# Patient Record
Sex: FEMALE | Race: WHITE | NOT HISPANIC OR LATINO | Employment: OTHER | URBAN - NONMETROPOLITAN AREA
[De-identification: names, ages, dates, MRNs, and addresses within clinical notes are randomized per-mention and may not be internally consistent; named-entity substitution may affect disease eponyms.]

---

## 2017-01-17 ENCOUNTER — OFFICE VISIT (OUTPATIENT)
Dept: CARDIOLOGY | Facility: CLINIC | Age: 72
End: 2017-01-17

## 2017-01-17 VITALS
WEIGHT: 148.4 LBS | HEIGHT: 66 IN | SYSTOLIC BLOOD PRESSURE: 120 MMHG | BODY MASS INDEX: 23.85 KG/M2 | HEART RATE: 88 BPM | OXYGEN SATURATION: 96 % | DIASTOLIC BLOOD PRESSURE: 80 MMHG

## 2017-01-17 DIAGNOSIS — M81.0 OSTEOPOROSIS: ICD-10-CM

## 2017-01-17 DIAGNOSIS — E78.2 MIXED HYPERLIPIDEMIA: Primary | ICD-10-CM

## 2017-01-17 DIAGNOSIS — R53.82 CHRONIC FATIGUE: ICD-10-CM

## 2017-01-17 PROCEDURE — 99213 OFFICE O/P EST LOW 20 MIN: CPT | Performed by: INTERNAL MEDICINE

## 2017-01-17 NOTE — PROGRESS NOTES
subjective     Chief Complaint   Patient presents with   • Hyperlipidemia   • Osteoporosis     History of Present Illness    Hyperlipidemia  Elise Santamaria has long-standing history of hyperlipidemia.  Has been trying to lose weight and trying to follow diet and activity recommandations.  Patient is tolerating medications very well.  There has been no side effects.  No lab work since initiating Zocor on a regular basis.  Lab work will be checked next visit.    She has osteoporosis and complains of mild chronic fatigue also.  Will check B12 levels.  10 vitamin D.    Patient Active Problem List   Diagnosis   • Hyperlipidemia   • Osteoporosis       Social History   Substance Use Topics   • Smoking status: Never Smoker   • Smokeless tobacco: Never Used   • Alcohol use No       Allergies   Allergen Reactions   • Latex          Current Outpatient Prescriptions:   •  aspirin 81 MG EC tablet, Take 81 mg by mouth daily., Disp: , Rfl:   •  cholecalciferol (VITAMIN D3) 1000 UNITS tablet, Take 1,000 Units by mouth daily., Disp: , Rfl:   •  Multiple Vitamin (MULTI VITAMIN DAILY PO), Take  by mouth., Disp: , Rfl:   •  Omega-3 Fatty Acids (FISH OIL) 1000 MG capsule capsule, Take 1,000 mg by mouth 2 (two) times a day with meals., Disp: , Rfl:   •  simvastatin (ZOCOR) 20 MG tablet, Take 1 tablet by mouth Every Night., Disp: 90 tablet, Rfl: 1  •  vitamin B-12 (CYANOCOBALAMIN) 1000 MCG tablet, Take 1,000 mcg by mouth daily., Disp: , Rfl:       The following portions of the patient's history were reviewed and updated as appropriate: allergies, current medications, past family history, past medical history, past social history, past surgical history and problem list.    Review of Systems   Constitution: Positive for malaise/fatigue.   HENT: Negative.    Eyes: Negative.    Cardiovascular: Negative.    Respiratory: Negative.    Hematologic/Lymphatic: Negative.    Musculoskeletal: Negative.    Gastrointestinal: Negative.   "  Neurological: Negative.           Objective:     Visit Vitals   • /80 (BP Location: Left arm, Patient Position: Sitting)   • Pulse 88   • Ht 66\" (167.6 cm)   • Wt 148 lb 6.4 oz (67.3 kg)   • SpO2 96%   • BMI 23.95 kg/m2     Physical Exam   Constitutional: She appears well-developed and well-nourished.   HENT:   Head: Normocephalic and atraumatic.   Mouth/Throat: Oropharynx is clear and moist.   Eyes: Conjunctivae and EOM are normal. Pupils are equal, round, and reactive to light. No scleral icterus.   Neck: Normal range of motion. Neck supple. No JVD present. No tracheal deviation present. No thyromegaly present.   Cardiovascular: Normal rate, regular rhythm, normal heart sounds and intact distal pulses.  Exam reveals no friction rub.    No murmur heard.  Pulmonary/Chest: Effort normal and breath sounds normal. No respiratory distress. She has no wheezes. She has no rales. She exhibits no tenderness.   Abdominal: Soft. Bowel sounds are normal. She exhibits no distension and no mass. There is no tenderness. There is no rebound and no guarding.   Musculoskeletal: Normal range of motion. She exhibits no edema, tenderness or deformity.   Lymphadenopathy:     She has no cervical adenopathy.   Neurological: She is alert. She has normal reflexes. No cranial nerve deficit. She exhibits normal muscle tone. Coordination normal.   Skin: Skin is warm and dry.   Psychiatric: She has a normal mood and affect. Her behavior is normal. Judgment and thought content normal.         Lab Review  Lab Results   Component Value Date     10/27/2016    K 3.9 10/27/2016     10/27/2016    BUN 16 10/27/2016    CREATININE 0.94 10/27/2016    GLUCOSE 88 10/27/2016    CALCIUM 9.8 10/27/2016    ALT 28 10/27/2016    ALKPHOS 72 10/27/2016    LABIL2 1.7 10/27/2016     Lab Results   Component Value Date    CKTOTAL 92 10/27/2016     Lab Results   Component Value Date    WBC 3.61 (L) 10/27/2016    HGB 14.0 10/27/2016    HCT 44.7 " 10/27/2016     10/27/2016     Lab Results   Component Value Date    INR 0.95 05/19/2014     No results found for: MG  Lab Results   Component Value Date    TSH 2.389 10/27/2016     No results found for: BNP  Lab Results   Component Value Date    CHLPL 195 05/09/2016     Lab Results   Component Value Date    CHOL 230 (H) 10/27/2016    TRIG 125 10/27/2016    HDL 62 10/27/2016    LDLCALC 143 (H) 10/27/2016    VLDL 25 10/27/2016    LDLHDL 2.31 10/27/2016         Procedures     I personally viewed and interpreted the patient's LAB data         Assessment:     1. Mixed hyperlipidemia    2. Osteoporosis    3. Chronic fatigue          Plan:      Patient has significant hyperlipidemia with LDL of 143.  Since that lab reports she has been taking Zocor 20 mg daily.  There has been no side effects lab work will be checked in 3 months and medicine will need to be adjusted.  Aggressive risk factor modification was stressed again.  Otherwise patient is doing very well no change in therapy was made.      Return in about 3 months (around 4/17/2017).

## 2017-01-17 NOTE — MR AVS SNAPSHOT
Elise Santamaria   1/17/2017 10:45 AM   Office Visit    Dept Phone:  [phone]   Encounter #:  [id number]    Provider:  Víctor Rodrigues MD   Department:  [facility] CARDIOLOGY                Your Full Care Plan              Your Updated Medication List          This list is accurate as of: 1/17/17 11:20 AM.  Always use your most recent med list.                aspirin 81 MG EC tablet       cholecalciferol 1000 UNITS tablet   Commonly known as:  VITAMIN D3       fish oil 1000 MG capsule capsule       MULTI VITAMIN DAILY PO       simvastatin 20 MG tablet   Commonly known as:  ZOCOR   Take 1 tablet by mouth Every Night.       vitamin B-12 1000 MCG tablet   Commonly known as:  CYANOCOBALAMIN               You Were Diagnosed With        Codes Comments    Mixed hyperlipidemia    -  Primary ICD-10-CM: E78.2  ICD-9-CM: 272.2     Osteoporosis     ICD-10-CM: M81.0  ICD-9-CM: 733.00     Chronic fatigue     ICD-10-CM: R53.82  ICD-9-CM: 780.79       Instructions     None    Patient Instructions History      Upcoming Appointments     Visit Type Date Time Department    FOLLOW UP 1/17/2017 10:45 AM [facility] CARDIOLOGY [facility]    LAB 4/10/2017  8:30 AM [facility]ARDI[facility]    FOLLOW UP 4/12/2017 11:00 AM [facility]      MyChart Signup     Our records indicate that you have declined [facility] MyChart signup. If you would like to sign up for MyChart, please email [email or web address] or call [phone] to obtain an activation code.             Other Info from Your Visit           Your Appointments     Apr 10, 2017  8:30 AM EDT   Lab with [facility]   [facility] CARDIOLOGY (--)    [address] [place] [address]   [phone]            Apr 12, 2017 11:00 AM EDT   Follow Up with Víctor Rodrigues MD   [facility] CARDIOLOGY (--)    [address] [place] [address]   [phone]           Arrive 15  "minutes prior to appointment.              Allergies     Latex        Reason for Visit     Hyperlipidemia     Osteoporosis           Vital Signs     Blood Pressure Pulse Height Weight Oxygen Saturation Body Mass Index    120/80 (BP Location: Left arm, Patient Position: Sitting) 88 66\" (167.6 cm) 148 lb 6.4 oz (67.3 kg) 96% 23.95 kg/m2    Smoking Status                   Never Smoker           Problems and Diagnoses Noted     High cholesterol or triglycerides    Osteoporosis    Chronic fatigue            "

## 2017-02-15 ENCOUNTER — TELEPHONE (OUTPATIENT)
Dept: CARDIOLOGY | Facility: CLINIC | Age: 72
End: 2017-02-15

## 2017-02-15 RX ORDER — AMOXICILLIN 500 MG/1
500 CAPSULE ORAL 3 TIMES DAILY
Qty: 21 CAPSULE | Refills: 0 | Status: SHIPPED | OUTPATIENT
Start: 2017-02-15 | End: 2017-04-12

## 2017-02-15 NOTE — TELEPHONE ENCOUNTER
----- Message from Víctor Rodrigues MD sent at 2/14/2017  4:28 PM EST -----  Amoxicillin 500 3 times a day  ----- Message -----     From: Ophelia Silva MA     Sent: 2/14/2017   3:06 PM       To: Víctor Rodrigues MD    Pt c/o cough and congestion  No fever    Requesting antibiotic   [facility]

## 2017-03-16 ENCOUNTER — TELEPHONE (OUTPATIENT)
Dept: CARDIOLOGY | Facility: CLINIC | Age: 72
End: 2017-03-16

## 2017-03-16 RX ORDER — SIMVASTATIN 20 MG
20 TABLET ORAL NIGHTLY
Qty: 90 TABLET | Refills: 1 | Status: SHIPPED | OUTPATIENT
Start: 2017-03-16 | End: 2017-07-12 | Stop reason: SDUPTHER

## 2017-03-16 NOTE — TELEPHONE ENCOUNTER
----- Message from Wendi Rees sent at 3/16/2017  1:57 PM EDT -----  Regarding: REFILL   [facility]  SIMVASTATIN 20 MG QD

## 2017-04-10 ENCOUNTER — LAB (OUTPATIENT)
Dept: CARDIOLOGY | Facility: CLINIC | Age: 72
End: 2017-04-10

## 2017-04-10 DIAGNOSIS — R53.82 CHRONIC FATIGUE: ICD-10-CM

## 2017-04-10 DIAGNOSIS — E78.2 MIXED HYPERLIPIDEMIA: ICD-10-CM

## 2017-04-10 DIAGNOSIS — M81.0 OSTEOPOROSIS: ICD-10-CM

## 2017-04-10 LAB
25(OH)D3 SERPL-MCNC: 41 NG/ML
ALBUMIN SERPL-MCNC: 4.3 G/DL (ref 3.4–4.8)
ALBUMIN/GLOB SERPL: 1.9 G/DL (ref 1.5–2.5)
ALP SERPL-CCNC: 64 U/L (ref 35–104)
ALT SERPL W P-5'-P-CCNC: 25 U/L (ref 10–36)
ANION GAP SERPL CALCULATED.3IONS-SCNC: 2 MMOL/L (ref 3.6–11.2)
AST SERPL-CCNC: 22 U/L (ref 10–30)
BASOPHILS # BLD AUTO: 0.05 10*3/MM3 (ref 0–0.3)
BASOPHILS NFR BLD AUTO: 1.4 % (ref 0–2)
BILIRUB SERPL-MCNC: 0.6 MG/DL (ref 0.2–1.8)
BUN BLD-MCNC: 12 MG/DL (ref 7–21)
BUN/CREAT SERPL: 14.6 (ref 7–25)
CALCIUM SPEC-SCNC: 9.9 MG/DL (ref 7.7–10)
CHLORIDE SERPL-SCNC: 108 MMOL/L (ref 99–112)
CHOLEST SERPL-MCNC: 167 MG/DL (ref 0–200)
CK SERPL-CCNC: 73 U/L (ref 24–173)
CO2 SERPL-SCNC: 32 MMOL/L (ref 24.3–31.9)
CREAT BLD-MCNC: 0.82 MG/DL (ref 0.43–1.29)
DEPRECATED RDW RBC AUTO: 42.5 FL (ref 37–54)
EOSINOPHIL # BLD AUTO: 0.23 10*3/MM3 (ref 0–0.7)
EOSINOPHIL NFR BLD AUTO: 6.2 % (ref 0–7)
ERYTHROCYTE [DISTWIDTH] IN BLOOD BY AUTOMATED COUNT: 12.8 % (ref 11.5–14.5)
GFR SERPL CREATININE-BSD FRML MDRD: 69 ML/MIN/1.73
GLOBULIN UR ELPH-MCNC: 2.3 GM/DL
GLUCOSE BLD-MCNC: 90 MG/DL (ref 70–110)
HCT VFR BLD AUTO: 43.5 % (ref 37–47)
HDLC SERPL-MCNC: 59 MG/DL (ref 60–100)
HGB BLD-MCNC: 14.1 G/DL (ref 12–16)
IMM GRANULOCYTES # BLD: 0.01 10*3/MM3 (ref 0–0.03)
IMM GRANULOCYTES NFR BLD: 0.3 % (ref 0–0.5)
LDLC SERPL CALC-MCNC: 83 MG/DL (ref 0–100)
LDLC/HDLC SERPL: 1.41 {RATIO}
LYMPHOCYTES # BLD AUTO: 1.5 10*3/MM3 (ref 1–3)
LYMPHOCYTES NFR BLD AUTO: 40.5 % (ref 16–46)
MCH RBC QN AUTO: 30.1 PG (ref 27–33)
MCHC RBC AUTO-ENTMCNC: 32.4 G/DL (ref 33–37)
MCV RBC AUTO: 92.8 FL (ref 80–94)
MONOCYTES # BLD AUTO: 0.34 10*3/MM3 (ref 0.1–0.9)
MONOCYTES NFR BLD AUTO: 9.2 % (ref 0–12)
NEUTROPHILS # BLD AUTO: 1.57 10*3/MM3 (ref 1.4–6.5)
NEUTROPHILS NFR BLD AUTO: 42.4 % (ref 40–75)
OSMOLALITY SERPL CALC.SUM OF ELEC: 282.4 MOSM/KG (ref 273–305)
PLATELET # BLD AUTO: 212 10*3/MM3 (ref 130–400)
PMV BLD AUTO: 10.8 FL (ref 6–10)
POTASSIUM BLD-SCNC: 4.7 MMOL/L (ref 3.5–5.3)
PROT SERPL-MCNC: 6.6 G/DL (ref 6–8)
RBC # BLD AUTO: 4.69 10*6/MM3 (ref 4.2–5.4)
SODIUM BLD-SCNC: 142 MMOL/L (ref 135–153)
TRIGL SERPL-MCNC: 124 MG/DL (ref 0–150)
VIT B12 BLD-MCNC: 1193 PG/ML (ref 211–911)
VLDLC SERPL-MCNC: 24.8 MG/DL
WBC NRBC COR # BLD: 3.7 10*3/MM3 (ref 4.5–12.5)

## 2017-04-10 PROCEDURE — 85025 COMPLETE CBC W/AUTO DIFF WBC: CPT | Performed by: INTERNAL MEDICINE

## 2017-04-10 PROCEDURE — 82607 VITAMIN B-12: CPT | Performed by: INTERNAL MEDICINE

## 2017-04-10 PROCEDURE — 80053 COMPREHEN METABOLIC PANEL: CPT | Performed by: INTERNAL MEDICINE

## 2017-04-10 PROCEDURE — 82550 ASSAY OF CK (CPK): CPT | Performed by: INTERNAL MEDICINE

## 2017-04-10 PROCEDURE — 82306 VITAMIN D 25 HYDROXY: CPT | Performed by: INTERNAL MEDICINE

## 2017-04-10 PROCEDURE — 80061 LIPID PANEL: CPT | Performed by: INTERNAL MEDICINE

## 2017-04-12 ENCOUNTER — OFFICE VISIT (OUTPATIENT)
Dept: CARDIOLOGY | Facility: CLINIC | Age: 72
End: 2017-04-12

## 2017-04-12 VITALS
HEART RATE: 85 BPM | SYSTOLIC BLOOD PRESSURE: 128 MMHG | HEIGHT: 66 IN | WEIGHT: 153.6 LBS | OXYGEN SATURATION: 97 % | DIASTOLIC BLOOD PRESSURE: 76 MMHG | BODY MASS INDEX: 24.68 KG/M2

## 2017-04-12 DIAGNOSIS — E78.2 MIXED HYPERLIPIDEMIA: Primary | ICD-10-CM

## 2017-04-12 DIAGNOSIS — M81.0 OSTEOPOROSIS: ICD-10-CM

## 2017-04-12 PROCEDURE — 99213 OFFICE O/P EST LOW 20 MIN: CPT | Performed by: INTERNAL MEDICINE

## 2017-04-12 NOTE — PROGRESS NOTES
subjective     Chief Complaint   Patient presents with   • Hyperlipidemia   • Osteoporosis     History of Present Illness  Patient is doing very well  Hyperlipidemia  Elise Santamaria has long-standing history of hyperlipidemia. Has been trying to lose weight and trying to follow diet and activity recommandations. Patient is tolerating medications very well. There has been no side effects. No lab work since initiating Zocor on a regular basis.  Lab work will be checked next visit.     She has osteoporosis and complains of mild chronic fatigue also. Will check B12 levels.  10 vitamin D.  Patient Active Problem List   Diagnosis   • Hyperlipidemia   • Osteoporosis       Social History   Substance Use Topics   • Smoking status: Never Smoker   • Smokeless tobacco: Never Used   • Alcohol use No       Allergies   Allergen Reactions   • Latex          Current Outpatient Prescriptions:   •  aspirin 81 MG EC tablet, Take 81 mg by mouth daily., Disp: , Rfl:   •  cholecalciferol (VITAMIN D3) 1000 UNITS tablet, Take 1,000 Units by mouth daily., Disp: , Rfl:   •  Multiple Vitamin (MULTI VITAMIN DAILY PO), Take  by mouth., Disp: , Rfl:   •  Omega-3 Fatty Acids (FISH OIL) 1000 MG capsule capsule, Take 1,000 mg by mouth 2 (two) times a day with meals., Disp: , Rfl:   •  simvastatin (ZOCOR) 20 MG tablet, Take 1 tablet by mouth Every Night., Disp: 90 tablet, Rfl: 1  •  vitamin B-12 (CYANOCOBALAMIN) 1000 MCG tablet, Take 1,000 mcg by mouth daily., Disp: , Rfl:       The following portions of the patient's history were reviewed and updated as appropriate: allergies, current medications, past family history, past medical history, past social history, past surgical history and problem list.    Review of Systems   Constitution: Negative.   HENT: Negative.    Eyes: Negative.    Cardiovascular: Negative.    Respiratory: Negative.    Hematologic/Lymphatic: Negative.    Musculoskeletal: Negative.    Gastrointestinal: Negative.   "  Neurological: Negative.           Objective:     /76 (BP Location: Left arm, Patient Position: Sitting)  Pulse 85  Ht 66\" (167.6 cm)  Wt 153 lb 9.6 oz (69.7 kg)  SpO2 97%  BMI 24.79 kg/m2  Physical Exam   Constitutional: She appears well-developed and well-nourished.   HENT:   Head: Normocephalic and atraumatic.   Mouth/Throat: Oropharynx is clear and moist.   Eyes: Conjunctivae and EOM are normal. Pupils are equal, round, and reactive to light. No scleral icterus.   Neck: Normal range of motion. Neck supple. No JVD present. No tracheal deviation present. No thyromegaly present.   Cardiovascular: Normal rate, regular rhythm, normal heart sounds and intact distal pulses.  Exam reveals no friction rub.    No murmur heard.  Pulmonary/Chest: Effort normal and breath sounds normal. No respiratory distress. She has no wheezes. She has no rales. She exhibits no tenderness.   Abdominal: Soft. Bowel sounds are normal. She exhibits no distension and no mass. There is no tenderness. There is no rebound and no guarding.   Musculoskeletal: Normal range of motion. She exhibits no edema, tenderness or deformity.   Lymphadenopathy:     She has no cervical adenopathy.   Neurological: She is alert. She has normal reflexes. No cranial nerve deficit. She exhibits normal muscle tone. Coordination normal.   Skin: Skin is warm and dry.   Psychiatric: She has a normal mood and affect. Her behavior is normal. Judgment and thought content normal.         Lab Review  Lab Results   Component Value Date     04/10/2017    K 4.7 04/10/2017     04/10/2017    BUN 12 04/10/2017    CREATININE 0.82 04/10/2017    GLUCOSE 90 04/10/2017    CALCIUM 9.9 04/10/2017    ALT 25 04/10/2017    ALKPHOS 64 04/10/2017    LABIL2 1.9 04/10/2017     Lab Results   Component Value Date    CKTOTAL 73 04/10/2017     Lab Results   Component Value Date    WBC 3.70 (L) 04/10/2017    HGB 14.1 04/10/2017    HCT 43.5 04/10/2017     04/10/2017 "     Lab Results   Component Value Date    INR 0.95 05/19/2014     No results found for: MG  Lab Results   Component Value Date    TSH 2.389 10/27/2016     No results found for: BNP  Lab Results   Component Value Date    CHLPL 195 05/09/2016     Lab Results   Component Value Date    CHOL 167 04/10/2017    TRIG 124 04/10/2017    HDL 59 (L) 04/10/2017    LDLCALC 83 04/10/2017    VLDL 24.8 04/10/2017    LDLHDL 1.41 04/10/2017         Procedures     I personally viewed and interpreted the patient's LAB data         Assessment:     1. Mixed hyperlipidemia    2. Osteoporosis          Plan:      lab work discussed with the patient cholesterol is significantly better.  She will continue current medications.     white count is mildly low but hemoglobin and platelets are normal.  Thyroid functions were normal.  Aggressive risk factor modification was stressed again.  Otherwise patient is doing very well no change in therapy was made.    No Follow-up on file.

## 2017-07-12 ENCOUNTER — OFFICE VISIT (OUTPATIENT)
Dept: CARDIOLOGY | Facility: CLINIC | Age: 72
End: 2017-07-12

## 2017-07-12 VITALS
BODY MASS INDEX: 24.59 KG/M2 | HEART RATE: 86 BPM | OXYGEN SATURATION: 96 % | WEIGHT: 153 LBS | RESPIRATION RATE: 18 BRPM | SYSTOLIC BLOOD PRESSURE: 118 MMHG | DIASTOLIC BLOOD PRESSURE: 80 MMHG | HEIGHT: 66 IN

## 2017-07-12 DIAGNOSIS — N39.45 CONTINUOUS LEAKAGE OF URINE: ICD-10-CM

## 2017-07-12 DIAGNOSIS — M81.0 OSTEOPOROSIS: ICD-10-CM

## 2017-07-12 DIAGNOSIS — E78.2 MIXED HYPERLIPIDEMIA: Primary | ICD-10-CM

## 2017-07-12 DIAGNOSIS — K21.9 GASTROESOPHAGEAL REFLUX DISEASE WITHOUT ESOPHAGITIS: ICD-10-CM

## 2017-07-12 DIAGNOSIS — E55.9 VITAMIN D DEFICIENCY: ICD-10-CM

## 2017-07-12 PROBLEM — M67.40 GANGLION CYST: Status: ACTIVE | Noted: 2017-07-12

## 2017-07-12 PROCEDURE — 99214 OFFICE O/P EST MOD 30 MIN: CPT | Performed by: INTERNAL MEDICINE

## 2017-07-12 RX ORDER — PANTOPRAZOLE SODIUM 40 MG/1
40 TABLET, DELAYED RELEASE ORAL DAILY
Qty: 90 TABLET | Refills: 1 | Status: SHIPPED | OUTPATIENT
Start: 2017-07-12 | End: 2018-01-10 | Stop reason: SDUPTHER

## 2017-07-12 RX ORDER — SIMVASTATIN 20 MG
20 TABLET ORAL NIGHTLY
Qty: 90 TABLET | Refills: 1 | Status: SHIPPED | OUTPATIENT
Start: 2017-07-12 | End: 2017-09-19 | Stop reason: SDUPTHER

## 2017-07-12 RX ORDER — SOLIFENACIN SUCCINATE 10 MG/1
10 TABLET, FILM COATED ORAL DAILY
Qty: 90 TABLET | Refills: 1 | Status: SHIPPED | OUTPATIENT
Start: 2017-07-12 | End: 2017-07-21

## 2017-07-12 RX ORDER — NYSTATIN 100000 [USP'U]/G
POWDER TOPICAL
Refills: 2 | COMMUNITY
Start: 2017-04-17 | End: 2018-01-10

## 2017-07-12 NOTE — PROGRESS NOTES
subjective     Chief Complaint   Patient presents with   • Hyperlipidemia   • Osteoporosis   • Weight Loss     History of Present Illness  Patient states that she has been having stress incontinence.  The dad she has developed some rash in the groin area.  She saw the gynecologist and was given Diflucan it did not help.  She saw skin doctor and was given Mycostatin powder which also did not help much.  Patient wants to know what she could do.  Incontinence we'll need to be taken care off.  Patient was advised to take Vesicare and continue with the Mycostatin powder.    Hyperlipidemia  Elise Santamaria has long-standing history of hyperlipidemia. Has been trying to lose weight and trying to follow diet and activity recommandations. Patient is tolerating medications very well. There has been no side effects. No lab work since initiating Zocor on a regular basis.  Lab work will be checked next visit.      She has osteoporosis and complains of mild chronic fatigue also.   She needs DEXA scan which was arranged.      GERD  Elise Santamaria has long-standing history of gastroesophageal reflux disorder however she significantly better on medications.  There is no nausea or vomiting.  No hematemesis or melena.  No abdominal pain.  Mild epigastric heartburns are noted.  Appetite is good bowel movements are normal.    Patient also has vitamin D deficiency and vitamin B deficiency on treatment.    Past Surgical History:   Procedure Laterality Date   • CARDIAC CATHETERIZATION  2014   • CARDIOVASCULAR STRESS TEST  2014   • ECHO - CONVERTED  2014   • ENDOSCOPY  2015   • HYSTERECTOMY  2002   • LAPAROSCOPIC CHOLECYSTECTOMY  2001     Family History   Problem Relation Age of Onset   • Heart attack Mother    • Heart failure Mother    • Hypertension Mother    • Heart attack Father    • Hypertension Father    • Heart block Brother    • Heart block Sister    • Heart block Sister    • Heart attack Brother    • Heart block Brother    •  Stroke Brother    • Breast cancer Neg Hx      Past Medical History:   Diagnosis Date   • Hyperlipidemia    • Osteoporosis    • Tongue irritation    • Weight loss      Patient Active Problem List   Diagnosis   • Hyperlipidemia   • Osteoporosis   • Ganglion cyst   • Gastroesophageal reflux disease without esophagitis   • Vitamin D deficiency   • Continuous leakage of urine       Social History   Substance Use Topics   • Smoking status: Never Smoker   • Smokeless tobacco: Never Used   • Alcohol use No       Allergies   Allergen Reactions   • Latex        Current Outpatient Prescriptions on File Prior to Visit   Medication Sig   • aspirin 81 MG EC tablet Take 81 mg by mouth daily.   • cholecalciferol (VITAMIN D3) 1000 UNITS tablet Take 1,000 Units by mouth daily.   • Multiple Vitamin (MULTI VITAMIN DAILY PO) Take  by mouth.   • Omega-3 Fatty Acids (FISH OIL) 1000 MG capsule capsule Take 1,000 mg by mouth 2 (two) times a day with meals.   • vitamin B-12 (CYANOCOBALAMIN) 1000 MCG tablet Take 1,000 mcg by mouth daily.     No current facility-administered medications on file prior to visit.          The following portions of the patient's history were reviewed and updated as appropriate: allergies, current medications, past family history, past medical history, past social history, past surgical history and problem list.    Review of Systems   Constitution: Negative.   HENT: Negative.  Negative for congestion and headaches.    Eyes: Negative.    Cardiovascular: Negative.  Negative for chest pain, cyanosis, dyspnea on exertion, irregular heartbeat, leg swelling, near-syncope, orthopnea, palpitations, paroxysmal nocturnal dyspnea and syncope.   Respiratory: Negative.  Negative for shortness of breath.    Hematologic/Lymphatic: Negative.    Skin: Positive for rash.   Musculoskeletal: Negative.    Gastrointestinal: Negative.    Genitourinary: Positive for bladder incontinence.   Neurological: Negative.   "  Psychiatric/Behavioral: Negative.           Objective:     /80 (BP Location: Left arm, Patient Position: Sitting, Cuff Size: Adult)  Pulse 86  Resp 18  Ht 66\" (167.6 cm)  Wt 153 lb (69.4 kg)  SpO2 96%  BMI 24.69 kg/m2  Physical Exam   Constitutional: She appears well-developed and well-nourished.   HENT:   Head: Normocephalic and atraumatic.   Mouth/Throat: Oropharynx is clear and moist.   Eyes: Conjunctivae and EOM are normal. Pupils are equal, round, and reactive to light. No scleral icterus.   Neck: Normal range of motion. Neck supple. No JVD present. No tracheal deviation present. No thyromegaly present.   Cardiovascular: Normal rate, regular rhythm, normal heart sounds and intact distal pulses.  Exam reveals no friction rub.    No murmur heard.  Pulmonary/Chest: Effort normal and breath sounds normal. No respiratory distress. She has no wheezes. She has no rales. She exhibits no tenderness.   Abdominal: Soft. Bowel sounds are normal. She exhibits no distension and no mass. There is no tenderness. There is no rebound and no guarding.   Musculoskeletal: Normal range of motion. She exhibits no edema, tenderness or deformity.   Lymphadenopathy:     She has no cervical adenopathy.   Neurological: She is alert. She has normal reflexes. No cranial nerve deficit. She exhibits normal muscle tone. Coordination normal.   Skin: Skin is warm and dry.   Psychiatric: She has a normal mood and affect. Her behavior is normal. Judgment and thought content normal.         Lab Review  Lab Results   Component Value Date     04/10/2017    K 4.7 04/10/2017     04/10/2017    BUN 12 04/10/2017    CREATININE 0.82 04/10/2017    GLUCOSE 90 04/10/2017    CALCIUM 9.9 04/10/2017    ALT 25 04/10/2017    ALKPHOS 64 04/10/2017    LABIL2 1.9 04/10/2017     Lab Results   Component Value Date    CKTOTAL 73 04/10/2017     Lab Results   Component Value Date    WBC 3.70 (L) 04/10/2017    HGB 14.1 04/10/2017    HCT 43.5 " 04/10/2017     04/10/2017     Lab Results   Component Value Date    INR 0.95 05/19/2014     No results found for: MG  Lab Results   Component Value Date    TSH 2.389 10/27/2016     No results found for: BNP  Lab Results   Component Value Date    CHOL 167 04/10/2017    CHLPL 195 05/09/2016    TRIG 124 04/10/2017    HDL 59 (L) 04/10/2017    LDLCALC 83 04/10/2017    VLDL 24.8 04/10/2017    LDLHDL 1.41 04/10/2017         Procedures       I personally viewed and interpreted the patient's LAB data         Assessment:     1. Mixed hyperlipidemia    2. Osteoporosis    3. Vitamin D deficiency    4. Gastroesophageal reflux disease without esophagitis    5. Continuous leakage of urine          Plan:   Patient states that she has been having stress incontinence.  The dad she has developed some rash in the groin area.  She saw the gynecologist and was given Diflucan it did not help.  She saw skin doctor and was given Mycostatin powder which also did not help much.  Patient wants to know what she could do.  Incontinence we'll need to be taken care off.  Patient was advised to take Vesicare and continue with the Mycostatin powder.     Osteoporosis  DEXA scan was scheduled vitamin D level will be checked.    Hyperlipidemia had been very well controlled she was advised to continue Zocor will check lab work next visit.    GI symptoms are much better with Protonix which will be continued.    Refills were given  Patient will also take Vesicare 10 mg daily along with Mycostatin powder.    Follow-up scheduled        Return in about 3 months (around 10/12/2017).

## 2017-07-15 PROBLEM — N39.3 STRESS INCONTINENCE OF URINE: Status: ACTIVE | Noted: 2017-07-15

## 2017-07-20 ENCOUNTER — HOSPITAL ENCOUNTER (OUTPATIENT)
Dept: BONE DENSITY | Facility: HOSPITAL | Age: 72
Discharge: HOME | End: 2017-07-20
Attending: INTERNAL MEDICINE | Admitting: INTERNAL MEDICINE

## 2017-07-20 DIAGNOSIS — M81.0 OSTEOPOROSIS: ICD-10-CM

## 2017-07-20 PROCEDURE — 77080 DXA BONE DENSITY AXIAL: CPT

## 2017-07-20 PROCEDURE — 77080 DXA BONE DENSITY AXIAL: CPT | Performed by: RADIOLOGY

## 2017-07-21 ENCOUNTER — TELEPHONE (OUTPATIENT)
Dept: CARDIOLOGY | Facility: CLINIC | Age: 72
End: 2017-07-21

## 2017-07-21 RX ORDER — ALENDRONATE SODIUM 70 MG/1
70 TABLET ORAL
Qty: 4 TABLET | Refills: 2 | Status: SHIPPED | OUTPATIENT
Start: 2017-07-21 | End: 2018-04-10

## 2017-07-21 RX ORDER — TOLTERODINE 4 MG/1
4 CAPSULE, EXTENDED RELEASE ORAL DAILY
Qty: 30 CAPSULE | Refills: 0 | Status: SHIPPED | OUTPATIENT
Start: 2017-07-21 | End: 2017-07-24

## 2017-07-21 NOTE — TELEPHONE ENCOUNTER
----- Message from Víctor Rodrigues MD sent at 7/21/2017  9:57 AM EDT -----  Detrol LA 4 daily  ----- Message -----     From: Ophelia Silva MA     Sent: 7/21/2017   9:33 AM       To: Víctor Rodrigues MD    vesicare too expensive. Can she take something else?

## 2017-07-24 ENCOUNTER — TELEPHONE (OUTPATIENT)
Dept: CARDIOLOGY | Facility: CLINIC | Age: 72
End: 2017-07-24

## 2017-07-24 RX ORDER — OXYBUTYNIN CHLORIDE 5 MG/1
5 TABLET ORAL 2 TIMES DAILY
Qty: 60 TABLET | Refills: 2 | Status: SHIPPED | OUTPATIENT
Start: 2017-07-24 | End: 2017-11-08 | Stop reason: SDUPTHER

## 2017-07-24 NOTE — TELEPHONE ENCOUNTER
----- Message from Víctor Rodrigues MD sent at 7/24/2017 12:50 PM EDT -----  5 mg by mouth twice a day 60 with 2 refills  ----- Message -----     From: Ophelia Silva MA     Sent: 7/24/2017  11:18 AM       To: Víctor Rodrigues MD    [facility] called stating detrol was very expensive. Is it ok to switch to oxybutin? If so what dose?

## 2017-08-08 ENCOUNTER — TELEPHONE (OUTPATIENT)
Dept: CARDIOLOGY | Facility: CLINIC | Age: 72
End: 2017-08-08

## 2017-08-08 RX ORDER — AMOXICILLIN 500 MG/1
500 CAPSULE ORAL 3 TIMES DAILY
Qty: 21 CAPSULE | Refills: 0 | Status: SHIPPED | OUTPATIENT
Start: 2017-08-08 | End: 2017-10-11

## 2017-08-08 NOTE — TELEPHONE ENCOUNTER
----- Message from Víctor Rodrigues MD sent at 8/8/2017 12:13 PM EDT -----  Amoxicillin 500 3 times a day 7 days  ----- Message -----     From: Ophelia Silva MA     Sent: 8/8/2017  11:50 AM       To: Víctor Rodrigues MD    Pt c/o cough and congestion  No fever  Hoarse in the morning/ sore throat    Requesting antibiotic

## 2017-08-15 ENCOUNTER — HOSPITAL ENCOUNTER (OUTPATIENT)
Dept: GENERAL RADIOLOGY | Facility: HOSPITAL | Age: 72
Discharge: HOME | End: 2017-08-15
Attending: INTERNAL MEDICINE | Admitting: INTERNAL MEDICINE

## 2017-08-15 ENCOUNTER — TELEPHONE (OUTPATIENT)
Dept: CARDIOLOGY | Facility: CLINIC | Age: 72
End: 2017-08-15

## 2017-08-15 ENCOUNTER — LAB (OUTPATIENT)
Dept: LAB | Facility: HOSPITAL | Age: 72
End: 2017-08-15
Attending: INTERNAL MEDICINE

## 2017-08-15 DIAGNOSIS — R05.9 COUGH: Primary | ICD-10-CM

## 2017-08-15 DIAGNOSIS — M81.0 OSTEOPOROSIS: ICD-10-CM

## 2017-08-15 DIAGNOSIS — R05.9 COUGH: ICD-10-CM

## 2017-08-15 DIAGNOSIS — E78.2 MIXED HYPERLIPIDEMIA: ICD-10-CM

## 2017-08-15 LAB
25(OH)D3 SERPL-MCNC: 36 NG/ML
ALBUMIN SERPL-MCNC: 4.3 G/DL (ref 3.4–4.8)
ALBUMIN/GLOB SERPL: 1.7 G/DL (ref 1.5–2.5)
ALP SERPL-CCNC: 78 U/L (ref 35–104)
ALT SERPL W P-5'-P-CCNC: 24 U/L (ref 10–36)
ANION GAP SERPL CALCULATED.3IONS-SCNC: 5 MMOL/L (ref 3.6–11.2)
AST SERPL-CCNC: 20 U/L (ref 10–30)
BASOPHILS # BLD AUTO: 0.04 10*3/MM3 (ref 0–0.3)
BASOPHILS NFR BLD AUTO: 0.6 % (ref 0–2)
BILIRUB SERPL-MCNC: 0.6 MG/DL (ref 0.2–1.8)
BUN BLD-MCNC: 14 MG/DL (ref 7–21)
BUN/CREAT SERPL: 15.7 (ref 7–25)
CALCIUM SPEC-SCNC: 9.6 MG/DL (ref 7.7–10)
CHLORIDE SERPL-SCNC: 108 MMOL/L (ref 99–112)
CHOLEST SERPL-MCNC: 156 MG/DL (ref 0–200)
CK SERPL-CCNC: 72 U/L (ref 24–173)
CO2 SERPL-SCNC: 26 MMOL/L (ref 24.3–31.9)
CREAT BLD-MCNC: 0.89 MG/DL (ref 0.43–1.29)
DEPRECATED RDW RBC AUTO: 42.5 FL (ref 37–54)
EOSINOPHIL # BLD AUTO: 0.3 10*3/MM3 (ref 0–0.7)
EOSINOPHIL NFR BLD AUTO: 4.8 % (ref 0–7)
ERYTHROCYTE [DISTWIDTH] IN BLOOD BY AUTOMATED COUNT: 12.7 % (ref 11.5–14.5)
GFR SERPL CREATININE-BSD FRML MDRD: 62 ML/MIN/1.73
GLOBULIN UR ELPH-MCNC: 2.5 GM/DL
GLUCOSE BLD-MCNC: 94 MG/DL (ref 70–110)
HCT VFR BLD AUTO: 41.4 % (ref 37–47)
HDLC SERPL-MCNC: 47 MG/DL (ref 60–100)
HGB BLD-MCNC: 13.5 G/DL (ref 12–16)
IMM GRANULOCYTES # BLD: 0.01 10*3/MM3 (ref 0–0.03)
IMM GRANULOCYTES NFR BLD: 0.2 % (ref 0–0.5)
LDLC SERPL CALC-MCNC: 77 MG/DL (ref 0–100)
LDLC/HDLC SERPL: 1.63 {RATIO}
LYMPHOCYTES # BLD AUTO: 1.52 10*3/MM3 (ref 1–3)
LYMPHOCYTES NFR BLD AUTO: 24.4 % (ref 16–46)
MCH RBC QN AUTO: 29.9 PG (ref 27–33)
MCHC RBC AUTO-ENTMCNC: 32.6 G/DL (ref 33–37)
MCV RBC AUTO: 91.6 FL (ref 80–94)
MONOCYTES # BLD AUTO: 0.53 10*3/MM3 (ref 0.1–0.9)
MONOCYTES NFR BLD AUTO: 8.5 % (ref 0–12)
NEUTROPHILS # BLD AUTO: 3.84 10*3/MM3 (ref 1.4–6.5)
NEUTROPHILS NFR BLD AUTO: 61.5 % (ref 40–75)
OSMOLALITY SERPL CALC.SUM OF ELEC: 277.8 MOSM/KG (ref 273–305)
PLATELET # BLD AUTO: 214 10*3/MM3 (ref 130–400)
PMV BLD AUTO: 10.8 FL (ref 6–10)
POTASSIUM BLD-SCNC: 4.6 MMOL/L (ref 3.5–5.3)
PROT SERPL-MCNC: 6.8 G/DL (ref 6–8)
RBC # BLD AUTO: 4.52 10*6/MM3 (ref 4.2–5.4)
SODIUM BLD-SCNC: 139 MMOL/L (ref 135–153)
TRIGL SERPL-MCNC: 161 MG/DL (ref 0–150)
VIT B12 BLD-MCNC: 1010 PG/ML (ref 211–911)
VLDLC SERPL-MCNC: 32.2 MG/DL
WBC NRBC COR # BLD: 6.24 10*3/MM3 (ref 4.5–12.5)

## 2017-08-15 PROCEDURE — 82306 VITAMIN D 25 HYDROXY: CPT | Performed by: INTERNAL MEDICINE

## 2017-08-15 PROCEDURE — 82607 VITAMIN B-12: CPT | Performed by: INTERNAL MEDICINE

## 2017-08-15 PROCEDURE — 80053 COMPREHEN METABOLIC PANEL: CPT | Performed by: INTERNAL MEDICINE

## 2017-08-15 PROCEDURE — 71020 XR CHEST PA AND LATERAL: CPT | Performed by: RADIOLOGY

## 2017-08-15 PROCEDURE — 82550 ASSAY OF CK (CPK): CPT | Performed by: INTERNAL MEDICINE

## 2017-08-15 PROCEDURE — 85025 COMPLETE CBC W/AUTO DIFF WBC: CPT | Performed by: INTERNAL MEDICINE

## 2017-08-15 PROCEDURE — 71020 HC CHEST PA AND LATERAL: CPT

## 2017-08-15 PROCEDURE — 80061 LIPID PANEL: CPT | Performed by: INTERNAL MEDICINE

## 2017-08-15 NOTE — TELEPHONE ENCOUNTER
Pt states that amoxil was not helping her cough and congestion. Ordered Chest XR and CBC per Dr. Rodrigues

## 2017-08-17 ENCOUNTER — TELEPHONE (OUTPATIENT)
Dept: CARDIOLOGY | Facility: CLINIC | Age: 72
End: 2017-08-17

## 2017-08-17 RX ORDER — MONTELUKAST SODIUM 10 MG/1
10 TABLET ORAL NIGHTLY
Qty: 30 TABLET | Refills: 0 | Status: SHIPPED | OUTPATIENT
Start: 2017-08-17 | End: 2017-10-11 | Stop reason: SDUPTHER

## 2017-08-17 NOTE — TELEPHONE ENCOUNTER
----- Message from Víctor Rodrigues MD sent at 8/16/2017  3:04 PM EDT -----  Chest x-ray and lab work is all normal.   Singulair 10 mg daily 30 pills  ----- Message -----     From: Ophelia Silva MA     Sent: 8/16/2017   1:57 PM       To: Víctor Rodrigues MD    Pt c/o cough and states last rx of amoxil was not helping. We ordered chest xr and labs and she is requesting test results

## 2017-09-19 RX ORDER — SIMVASTATIN 20 MG
20 TABLET ORAL NIGHTLY
Qty: 90 TABLET | Refills: 1 | Status: SHIPPED | OUTPATIENT
Start: 2017-09-19 | End: 2017-12-21 | Stop reason: SDUPTHER

## 2017-10-11 ENCOUNTER — OFFICE VISIT (OUTPATIENT)
Dept: CARDIOLOGY | Facility: CLINIC | Age: 72
End: 2017-10-11

## 2017-10-11 VITALS
BODY MASS INDEX: 24.49 KG/M2 | WEIGHT: 152.4 LBS | SYSTOLIC BLOOD PRESSURE: 142 MMHG | OXYGEN SATURATION: 93 % | DIASTOLIC BLOOD PRESSURE: 78 MMHG | TEMPERATURE: 97.2 F | HEART RATE: 74 BPM | HEIGHT: 66 IN

## 2017-10-11 DIAGNOSIS — E78.2 MIXED HYPERLIPIDEMIA: ICD-10-CM

## 2017-10-11 DIAGNOSIS — R05.9 COUGH: Primary | ICD-10-CM

## 2017-10-11 DIAGNOSIS — E55.9 VITAMIN D DEFICIENCY: ICD-10-CM

## 2017-10-11 DIAGNOSIS — K21.9 GASTROESOPHAGEAL REFLUX DISEASE WITHOUT ESOPHAGITIS: ICD-10-CM

## 2017-10-11 DIAGNOSIS — Z23 IMMUNIZATION DUE: ICD-10-CM

## 2017-10-11 DIAGNOSIS — N39.45 CONTINUOUS LEAKAGE OF URINE: ICD-10-CM

## 2017-10-11 PROCEDURE — 90732 PPSV23 VACC 2 YRS+ SUBQ/IM: CPT | Performed by: INTERNAL MEDICINE

## 2017-10-11 PROCEDURE — G0009 ADMIN PNEUMOCOCCAL VACCINE: HCPCS | Performed by: INTERNAL MEDICINE

## 2017-10-11 PROCEDURE — G0008 ADMIN INFLUENZA VIRUS VAC: HCPCS | Performed by: INTERNAL MEDICINE

## 2017-10-11 PROCEDURE — 99214 OFFICE O/P EST MOD 30 MIN: CPT | Performed by: INTERNAL MEDICINE

## 2017-10-11 PROCEDURE — 90686 IIV4 VACC NO PRSV 0.5 ML IM: CPT | Performed by: INTERNAL MEDICINE

## 2017-10-11 RX ORDER — PREDNISONE 10 MG/1
10 TABLET ORAL DAILY
Qty: 20 TABLET | Refills: 0 | Status: SHIPPED | OUTPATIENT
Start: 2017-10-11 | End: 2018-01-10

## 2017-10-11 RX ORDER — AMOXICILLIN AND CLAVULANATE POTASSIUM 875; 125 MG/1; MG/1
1 TABLET, FILM COATED ORAL 2 TIMES DAILY
Qty: 14 TABLET | Refills: 0 | Status: SHIPPED | OUTPATIENT
Start: 2017-10-11 | End: 2018-01-10

## 2017-10-11 RX ORDER — MONTELUKAST SODIUM 10 MG/1
10 TABLET ORAL NIGHTLY
Qty: 30 TABLET | Refills: 0 | Status: SHIPPED | OUTPATIENT
Start: 2017-10-11 | End: 2017-11-20 | Stop reason: SDUPTHER

## 2017-10-11 NOTE — PROGRESS NOTES
subjective     Chief Complaint   Patient presents with   • Follow-up   • Hyperlipidemia   • Cough   • Sinus Problem     History of Present Illness  Patient is 72 years old white female who complained of cough expectoration and mild wheezing and was given antibiotics.  She comes today that she is feeling much better.  There is no fever no chills.  She is still coughing.  There is clear expectoration according to her.  She feels that she is not breathing good.  O2 sats have been normal and she has been afebrile.  She has stopped taking her Singulair and she is taking Zyrtec again.  Patient wants antibiotics although there is no fever.  Her chest x-ray was normal he did CBC was also normal.    Hyperlipidemia  Elise Santamaria has long-standing history of hyperlipidemia. Has been trying to lose weight and trying to follow diet and activity recommandations. Patient is tolerating medications very well. There has been no side effects. No lab work since initiating Zocor on a regular basis.  Lab work will be checked next visit.    Elise Santamaria has long-standing history of gastroesophageal reflux disorder however she significantly better on medications.  There is no nausea or vomiting.  No hematemesis or melena.  No abdominal pain.  Mild epigastric heartburns are noted.  Appetite is good bowel movements are normal.     Patient also has vitamin D deficiency and vitamin B deficiency on treatment.    She has osteoporosis and complains of mild chronic fatigue also.   She needs DEXA scan which was arranged.    Past Surgical History:   Procedure Laterality Date   • CARDIAC CATHETERIZATION  2014   • CARDIOVASCULAR STRESS TEST  2014   • ECHO - CONVERTED  2014   • ENDOSCOPY  2015   • HYSTERECTOMY  2002   • LAPAROSCOPIC CHOLECYSTECTOMY  2001     Family History   Problem Relation Age of Onset   • Heart attack Mother    • Heart failure Mother    • Hypertension Mother    • Heart attack Father    • Hypertension Father    • Heart  block Brother    • Heart block Sister    • Heart block Sister    • Heart attack Brother    • Heart block Brother    • Stroke Brother    • Breast cancer Neg Hx      Past Medical History:   Diagnosis Date   • Hyperlipidemia    • Osteoporosis    • Tongue irritation    • Weight loss      Patient Active Problem List   Diagnosis   • Hyperlipidemia   • Osteoporosis   • Ganglion cyst   • Gastroesophageal reflux disease without esophagitis   • Vitamin D deficiency   • Continuous leakage of urine   • Cough       Social History   Substance Use Topics   • Smoking status: Never Smoker   • Smokeless tobacco: Never Used   • Alcohol use No       Allergies   Allergen Reactions   • Latex        Current Outpatient Prescriptions on File Prior to Visit   Medication Sig   • aspirin 81 MG EC tablet Take 81 mg by mouth daily.   • cholecalciferol (VITAMIN D3) 1000 UNITS tablet Take 1,000 Units by mouth daily.   • Multiple Vitamin (MULTI VITAMIN DAILY PO) Take  by mouth.   • NYSTATIN 939545 UNIT/GM powder APPLY TO THE GROIN DAILY   • Omega-3 Fatty Acids (FISH OIL) 1000 MG capsule capsule Take 1,000 mg by mouth 2 (two) times a day with meals.   • oxybutynin (DITROPAN) 5 MG tablet Take 1 tablet by mouth 2 (Two) Times a Day.   • pantoprazole (PROTONIX) 40 MG EC tablet Take 1 tablet by mouth Daily.   • simvastatin (ZOCOR) 20 MG tablet Take 1 tablet by mouth Every Night.   • vitamin B-12 (CYANOCOBALAMIN) 1000 MCG tablet Take 1,000 mcg by mouth daily.   • alendronate (FOSAMAX) 70 MG tablet Take 1 tablet by mouth Every 7 (Seven) Days.     No current facility-administered medications on file prior to visit.          The following portions of the patient's history were reviewed and updated as appropriate: allergies, current medications, past family history, past medical history, past social history, past surgical history and problem list.    Review of Systems   Constitution: Negative. Negative for chills and fever.   HENT: Negative.  Negative for  "congestion.    Eyes: Negative.    Cardiovascular: Negative.  Negative for chest pain, cyanosis, dyspnea on exertion, irregular heartbeat, leg swelling, near-syncope, orthopnea, palpitations, paroxysmal nocturnal dyspnea and syncope.   Respiratory: Positive for cough and sputum production. Negative for hemoptysis, shortness of breath, sleep disturbances due to breathing, snoring and wheezing.    Hematologic/Lymphatic: Negative.    Musculoskeletal: Negative.    Gastrointestinal: Negative.    Neurological: Negative.  Negative for headaches.          Objective:     /78 (BP Location: Left arm, Patient Position: Sitting)  Pulse 74  Temp 97.2 °F (36.2 °C)  Ht 66\" (167.6 cm)  Wt 152 lb 6.4 oz (69.1 kg)  SpO2 93%  BMI 24.6 kg/m2  Physical Exam   Constitutional: She appears well-developed and well-nourished. No distress.   HENT:   Head: Normocephalic and atraumatic.   Mouth/Throat: Oropharynx is clear and moist. No oropharyngeal exudate.   Eyes: Conjunctivae and EOM are normal. Pupils are equal, round, and reactive to light. No scleral icterus.   Neck: Normal range of motion. Neck supple. No JVD present. No tracheal deviation present. No thyromegaly present.   Cardiovascular: Normal rate, regular rhythm, normal heart sounds and intact distal pulses.  PMI is not displaced.  Exam reveals no gallop, no friction rub and no decreased pulses.    No murmur heard.  Pulses:       Carotid pulses are 3+ on the right side, and 3+ on the left side.       Radial pulses are 3+ on the right side, and 3+ on the left side.   Pulmonary/Chest: Effort normal and breath sounds normal. No respiratory distress. She has no wheezes. She has no rales. She exhibits no tenderness.   Abdominal: Soft. Bowel sounds are normal. She exhibits no distension, no abdominal bruit and no mass. There is no splenomegaly or hepatomegaly. There is no tenderness. There is no rebound and no guarding.   Musculoskeletal: Normal range of motion. She exhibits no " edema, tenderness or deformity.   Lymphadenopathy:     She has no cervical adenopathy.   Neurological: She is alert. She has normal reflexes. No cranial nerve deficit. She exhibits normal muscle tone. Coordination normal.   Skin: Skin is warm and dry. No rash noted. She is not diaphoretic. No erythema.   Psychiatric: She has a normal mood and affect. Her behavior is normal. Judgment and thought content normal.         Lab Review  Lab Results   Component Value Date     08/15/2017    K 4.6 08/15/2017     08/15/2017    BUN 14 08/15/2017    CREATININE 0.89 08/15/2017    GLUCOSE 94 08/15/2017    CALCIUM 9.6 08/15/2017    ALT 24 08/15/2017    ALKPHOS 78 08/15/2017    LABIL2 1.7 08/15/2017     Lab Results   Component Value Date    CKTOTAL 72 08/15/2017     Lab Results   Component Value Date    WBC 6.24 08/15/2017    HGB 13.5 08/15/2017    HCT 41.4 08/15/2017     08/15/2017     Lab Results   Component Value Date    INR 0.95 05/19/2014     No results found for: MG  Lab Results   Component Value Date    TSH 2.389 10/27/2016     No results found for: BNP  Lab Results   Component Value Date    CHLPL 195 05/09/2016    CHOL 156 08/15/2017    TRIG 161 (H) 08/15/2017    HDL 47 (L) 08/15/2017    LDLCALC 77 08/15/2017    VLDL 32.2 08/15/2017    LDLHDL 1.63 08/15/2017         Procedures       I personally viewed and interpreted the patient's LAB data         Assessment:     1. Cough    2. Mixed hyperlipidemia    3. Gastroesophageal reflux disease without esophagitis    4. Vitamin D deficiency    5. Continuous leakage of urine    6. Immunization due          Plan:        Patient was given Augmentin 875 twice a day.  He was also given prednisone tapering dose for 2 weeks.  PFT was scheduled.  Patient was advised to restart Singulair also  Flu shot and pneumonia shot was given    Patient will continue rest of his medications.  Including Zocor.  Lab work scheduled for next visit.  E control is better with Ditropan  GI  symptoms are better with Protonix.    Return in about 3 months (around 1/11/2018).

## 2017-11-01 ENCOUNTER — TELEPHONE (OUTPATIENT)
Dept: CARDIOLOGY | Facility: CLINIC | Age: 72
End: 2017-11-01

## 2017-11-01 RX ORDER — FLUCONAZOLE 200 MG/1
200 TABLET ORAL DAILY
Qty: 5 TABLET | Refills: 0 | Status: SHIPPED | OUTPATIENT
Start: 2017-11-01 | End: 2018-01-10

## 2017-11-01 NOTE — TELEPHONE ENCOUNTER
Called pt ~ sent RX to [facility]    Diflucan 200 mg              Previous Messages      ----- Message -----      From: Jesica Solis MA     Sent: 10/31/2017   2:06 PM        To: Víctor Rodrigues MD     Pt requesting something for yeast infection due to recent round of antibiotic

## 2017-11-08 RX ORDER — OXYBUTYNIN CHLORIDE 5 MG/1
TABLET ORAL
Qty: 60 TABLET | Refills: 0 | Status: SHIPPED | OUTPATIENT
Start: 2017-11-08 | End: 2017-12-21 | Stop reason: SDUPTHER

## 2017-11-10 ENCOUNTER — HOSPITAL ENCOUNTER (OUTPATIENT)
Dept: RESPIRATORY THERAPY | Facility: HOSPITAL | Age: 72
Discharge: HOME | End: 2017-11-10
Attending: INTERNAL MEDICINE

## 2017-11-10 ENCOUNTER — HOSPITAL ENCOUNTER (OUTPATIENT)
Dept: RESPIRATORY THERAPY | Facility: HOSPITAL | Age: 72
Discharge: HOME | End: 2017-11-10
Attending: INTERNAL MEDICINE | Admitting: INTERNAL MEDICINE

## 2017-11-10 ENCOUNTER — TRANSCRIBE ORDERS (OUTPATIENT)
Dept: CARDIOLOGY | Facility: CLINIC | Age: 72
End: 2017-11-10

## 2017-11-10 DIAGNOSIS — E78.49 OTHER HYPERLIPIDEMIA: Primary | ICD-10-CM

## 2017-11-10 DIAGNOSIS — E78.2 MIXED HYPERLIPIDEMIA: ICD-10-CM

## 2017-11-10 DIAGNOSIS — E78.49 OTHER HYPERLIPIDEMIA: ICD-10-CM

## 2017-11-10 LAB
ARTERIAL PATENCY WRIST A: POSITIVE
ATMOSPHERIC PRESS: 732 MMHG
BASE EXCESS BLDA CALC-SCNC: 0.8 MMOL/L
BDY SITE: NORMAL
COHGB MFR BLD: 1.4 % (ref 0–5)
HCO3 BLDA-SCNC: 24.6 MMOL/L (ref 22–26)
HCT VFR BLD CALC: 42 % (ref 37–47)
HGB BLDA-MCNC: 14.4 G/DL (ref 12–16)
METHGB BLD QL: 0.3 % (ref 0–3)
MODALITY: NORMAL
OXYHGB MFR BLDV: 94.8 % (ref 85–100)
PCO2 BLDA: 36.8 MM HG (ref 35–45)
PH BLDA: 7.44 PH UNITS (ref 7.35–7.45)
PO2 BLDA: 82.2 MM HG (ref 80–100)
SAO2 % BLDCOA: 96.4 % (ref 90–100)

## 2017-11-10 PROCEDURE — 94729 DIFFUSING CAPACITY: CPT

## 2017-11-10 PROCEDURE — 94727 GAS DIL/WSHOT DETER LNG VOL: CPT | Performed by: INTERNAL MEDICINE

## 2017-11-10 PROCEDURE — 82805 BLOOD GASES W/O2 SATURATION: CPT | Performed by: INTERNAL MEDICINE

## 2017-11-10 PROCEDURE — 63710000001 ALBUTEROL PER 1 MG

## 2017-11-10 PROCEDURE — 94729 DIFFUSING CAPACITY: CPT | Performed by: INTERNAL MEDICINE

## 2017-11-10 PROCEDURE — 94060 EVALUATION OF WHEEZING: CPT

## 2017-11-10 PROCEDURE — 83050 HGB METHEMOGLOBIN QUAN: CPT | Performed by: INTERNAL MEDICINE

## 2017-11-10 PROCEDURE — A9270 NON-COVERED ITEM OR SERVICE: HCPCS

## 2017-11-10 PROCEDURE — 82375 ASSAY CARBOXYHB QUANT: CPT | Performed by: INTERNAL MEDICINE

## 2017-11-10 PROCEDURE — 94060 EVALUATION OF WHEEZING: CPT | Performed by: INTERNAL MEDICINE

## 2017-11-10 PROCEDURE — 94727 GAS DIL/WSHOT DETER LNG VOL: CPT

## 2017-11-10 PROCEDURE — 36600 WITHDRAWAL OF ARTERIAL BLOOD: CPT | Performed by: INTERNAL MEDICINE

## 2017-11-20 RX ORDER — MONTELUKAST SODIUM 10 MG/1
10 TABLET ORAL NIGHTLY
Qty: 90 TABLET | Refills: 0 | Status: SHIPPED | OUTPATIENT
Start: 2017-11-20 | End: 2018-02-27 | Stop reason: SDUPTHER

## 2017-12-21 RX ORDER — OXYBUTYNIN CHLORIDE 5 MG/1
5 TABLET ORAL
Qty: 180 TABLET | Refills: 0 | Status: SHIPPED | OUTPATIENT
Start: 2017-12-21 | End: 2018-04-10 | Stop reason: SDUPTHER

## 2017-12-21 RX ORDER — SIMVASTATIN 20 MG
20 TABLET ORAL NIGHTLY
Qty: 90 TABLET | Refills: 1 | Status: SHIPPED | OUTPATIENT
Start: 2017-12-21 | End: 2018-06-26 | Stop reason: SDUPTHER

## 2018-01-09 ENCOUNTER — LAB (OUTPATIENT)
Dept: LAB | Facility: HOSPITAL | Age: 73
End: 2018-01-09

## 2018-01-09 DIAGNOSIS — E78.2 MIXED HYPERLIPIDEMIA: ICD-10-CM

## 2018-01-09 LAB
ALBUMIN SERPL-MCNC: 4.3 G/DL (ref 3.4–4.8)
ALBUMIN/GLOB SERPL: 2 G/DL (ref 1.5–2.5)
ALP SERPL-CCNC: 71 U/L (ref 35–104)
ALT SERPL W P-5'-P-CCNC: 23 U/L (ref 10–36)
ANION GAP SERPL CALCULATED.3IONS-SCNC: 5.1 MMOL/L (ref 3.6–11.2)
AST SERPL-CCNC: 18 U/L (ref 10–30)
BASOPHILS # BLD AUTO: 0.03 10*3/MM3 (ref 0–0.3)
BASOPHILS NFR BLD AUTO: 0.7 % (ref 0–2)
BILIRUB SERPL-MCNC: 0.7 MG/DL (ref 0.2–1.8)
BUN BLD-MCNC: 10 MG/DL (ref 7–21)
BUN/CREAT SERPL: 12.8 (ref 7–25)
CALCIUM SPEC-SCNC: 9.4 MG/DL (ref 7.7–10)
CHLORIDE SERPL-SCNC: 107 MMOL/L (ref 99–112)
CHOLEST SERPL-MCNC: 168 MG/DL (ref 0–200)
CK SERPL-CCNC: 64 U/L (ref 24–173)
CO2 SERPL-SCNC: 30.9 MMOL/L (ref 24.3–31.9)
CREAT BLD-MCNC: 0.78 MG/DL (ref 0.43–1.29)
DEPRECATED RDW RBC AUTO: 42.6 FL (ref 37–54)
EOSINOPHIL # BLD AUTO: 0.18 10*3/MM3 (ref 0–0.7)
EOSINOPHIL NFR BLD AUTO: 4.4 % (ref 0–7)
ERYTHROCYTE [DISTWIDTH] IN BLOOD BY AUTOMATED COUNT: 12.5 % (ref 11.5–14.5)
GFR SERPL CREATININE-BSD FRML MDRD: 72 ML/MIN/1.73
GLOBULIN UR ELPH-MCNC: 2.2 GM/DL
GLUCOSE BLD-MCNC: 91 MG/DL (ref 70–110)
HCT VFR BLD AUTO: 42 % (ref 37–47)
HDLC SERPL-MCNC: 45 MG/DL (ref 60–100)
HGB BLD-MCNC: 13.8 G/DL (ref 12–16)
IMM GRANULOCYTES # BLD: 0 10*3/MM3 (ref 0–0.03)
IMM GRANULOCYTES NFR BLD: 0 % (ref 0–0.5)
LDLC SERPL CALC-MCNC: 95 MG/DL (ref 0–100)
LDLC/HDLC SERPL: 2.12 {RATIO}
LYMPHOCYTES # BLD AUTO: 1.39 10*3/MM3 (ref 1–3)
LYMPHOCYTES NFR BLD AUTO: 34.2 % (ref 16–46)
MCH RBC QN AUTO: 30.7 PG (ref 27–33)
MCHC RBC AUTO-ENTMCNC: 32.9 G/DL (ref 33–37)
MCV RBC AUTO: 93.3 FL (ref 80–94)
MONOCYTES # BLD AUTO: 0.46 10*3/MM3 (ref 0.1–0.9)
MONOCYTES NFR BLD AUTO: 11.3 % (ref 0–12)
NEUTROPHILS # BLD AUTO: 2.01 10*3/MM3 (ref 1.4–6.5)
NEUTROPHILS NFR BLD AUTO: 49.4 % (ref 40–75)
OSMOLALITY SERPL CALC.SUM OF ELEC: 283.6 MOSM/KG (ref 273–305)
PLATELET # BLD AUTO: 201 10*3/MM3 (ref 130–400)
PMV BLD AUTO: 11 FL (ref 6–10)
POTASSIUM BLD-SCNC: 4.1 MMOL/L (ref 3.5–5.3)
PROT SERPL-MCNC: 6.5 G/DL (ref 6–8)
RBC # BLD AUTO: 4.5 10*6/MM3 (ref 4.2–5.4)
SODIUM BLD-SCNC: 143 MMOL/L (ref 135–153)
TRIGL SERPL-MCNC: 139 MG/DL (ref 0–150)
VLDLC SERPL-MCNC: 27.8 MG/DL
WBC NRBC COR # BLD: 4.07 10*3/MM3 (ref 4.5–12.5)

## 2018-01-09 PROCEDURE — 80061 LIPID PANEL: CPT

## 2018-01-09 PROCEDURE — 80053 COMPREHEN METABOLIC PANEL: CPT

## 2018-01-09 PROCEDURE — 85025 COMPLETE CBC W/AUTO DIFF WBC: CPT

## 2018-01-09 PROCEDURE — 82550 ASSAY OF CK (CPK): CPT

## 2018-01-10 ENCOUNTER — OFFICE VISIT (OUTPATIENT)
Dept: CARDIOLOGY | Facility: CLINIC | Age: 73
End: 2018-01-10

## 2018-01-10 VITALS
DIASTOLIC BLOOD PRESSURE: 70 MMHG | OXYGEN SATURATION: 97 % | WEIGHT: 150 LBS | HEIGHT: 66 IN | BODY MASS INDEX: 24.11 KG/M2 | SYSTOLIC BLOOD PRESSURE: 122 MMHG | HEART RATE: 80 BPM

## 2018-01-10 DIAGNOSIS — Z91.09 ENVIRONMENTAL ALLERGIES: ICD-10-CM

## 2018-01-10 DIAGNOSIS — M81.0 AGE-RELATED OSTEOPOROSIS WITHOUT CURRENT PATHOLOGICAL FRACTURE: ICD-10-CM

## 2018-01-10 DIAGNOSIS — E78.2 MIXED HYPERLIPIDEMIA: Primary | ICD-10-CM

## 2018-01-10 DIAGNOSIS — K21.9 GASTROESOPHAGEAL REFLUX DISEASE WITHOUT ESOPHAGITIS: ICD-10-CM

## 2018-01-10 DIAGNOSIS — N39.45 CONTINUOUS LEAKAGE OF URINE: ICD-10-CM

## 2018-01-10 PROCEDURE — 99214 OFFICE O/P EST MOD 30 MIN: CPT | Performed by: INTERNAL MEDICINE

## 2018-01-10 RX ORDER — PANTOPRAZOLE SODIUM 40 MG/1
40 TABLET, DELAYED RELEASE ORAL DAILY
Qty: 90 TABLET | Refills: 1 | Status: SHIPPED | OUTPATIENT
Start: 2018-01-10 | End: 2018-08-14 | Stop reason: SDUPTHER

## 2018-01-10 NOTE — PROGRESS NOTES
subjective     Chief Complaint   Patient presents with   • Follow-up   • Hyperlipidemia   • Heartburn     History of Present Illness  Patient is 72 years old white female who is here for follow-up.  Patient has history of hyperlipidemia.  She is taking Zocor 20 mg daily along with fish oil.  She is also trying to diet.  She is tolerating medications very well there are no drug side effects.    She complains of heartburns.  She is taking Protonix 40 mg daily which is helping heartburns are much better.    Patient also takes Fosamax for osteoporosis no drug side effects noted.  She is taking vitamin D 3 1000 units daily also.    She also has multiple environmental allergies and taking Singulair with Singulair she is breathing better denies any congestion.    Stress incontinence is much better with Ditropan which will be continued.    Past Surgical History:   Procedure Laterality Date   • CARDIAC CATHETERIZATION  2014   • CARDIOVASCULAR STRESS TEST  2014   • ECHO - CONVERTED  2014   • ENDOSCOPY  2015   • HYSTERECTOMY  2002   • LAPAROSCOPIC CHOLECYSTECTOMY  2001     Family History   Problem Relation Age of Onset   • Heart attack Mother    • Heart failure Mother    • Hypertension Mother    • Heart attack Father    • Hypertension Father    • Heart block Brother    • Heart block Sister    • Heart block Sister    • Heart attack Brother    • Heart block Brother    • Stroke Brother    • Breast cancer Neg Hx      Past Medical History:   Diagnosis Date   • Hyperlipidemia    • Osteoporosis    • Tongue irritation    • Weight loss      Patient Active Problem List   Diagnosis   • Hyperlipidemia   • Osteoporosis   • Ganglion cyst   • Gastroesophageal reflux disease without esophagitis   • Vitamin D deficiency   • Continuous leakage of urine   • Cough   • Environmental allergies       Social History   Substance Use Topics   • Smoking status: Never Smoker   • Smokeless tobacco: Never Used   • Alcohol use No       Allergies   Allergen  "Reactions   • Latex        Current Outpatient Prescriptions on File Prior to Visit   Medication Sig   • alendronate (FOSAMAX) 70 MG tablet Take 1 tablet by mouth Every 7 (Seven) Days.   • aspirin 81 MG EC tablet Take 81 mg by mouth daily.   • cholecalciferol (VITAMIN D3) 1000 UNITS tablet Take 1,000 Units by mouth daily.   • montelukast (SINGULAIR) 10 MG tablet Take 1 tablet by mouth Every Night.   • Multiple Vitamin (MULTI VITAMIN DAILY PO) Take  by mouth.   • Omega-3 Fatty Acids (FISH OIL) 1000 MG capsule capsule Take 1,000 mg by mouth 2 (two) times a day with meals.   • oxybutynin (DITROPAN) 5 MG tablet Take 1 tablet by mouth 2 (Two) Times a Day.   • simvastatin (ZOCOR) 20 MG tablet Take 1 tablet by mouth Every Night.   • vitamin B-12 (CYANOCOBALAMIN) 1000 MCG tablet Take 1,000 mcg by mouth daily.     No current facility-administered medications on file prior to visit.          The following portions of the patient's history were reviewed and updated as appropriate: allergies, current medications, past family history, past medical history, past social history, past surgical history and problem list.    Review of Systems   Constitution: Negative.   HENT: Negative.  Negative for congestion.    Eyes: Negative.    Cardiovascular: Negative.  Negative for chest pain, cyanosis, dyspnea on exertion, irregular heartbeat, leg swelling, near-syncope, orthopnea, palpitations, paroxysmal nocturnal dyspnea and syncope.   Respiratory: Negative.  Negative for shortness of breath.    Hematologic/Lymphatic: Negative.    Musculoskeletal: Negative.    Gastrointestinal: Negative.    Neurological: Negative.  Negative for headaches.          Objective:     /70  Pulse 80  Ht 167.6 cm (66\")  Wt 68 kg (150 lb)  SpO2 97%  BMI 24.21 kg/m2  Physical Exam   Constitutional: She appears well-developed and well-nourished. No distress.   HENT:   Head: Normocephalic and atraumatic.   Mouth/Throat: Oropharynx is clear and moist. No " oropharyngeal exudate.   Eyes: Conjunctivae and EOM are normal. Pupils are equal, round, and reactive to light. No scleral icterus.   Neck: Normal range of motion. Neck supple. No JVD present. No tracheal deviation present. No thyromegaly present.   Cardiovascular: Normal rate, regular rhythm, normal heart sounds and intact distal pulses.  PMI is not displaced.  Exam reveals no gallop, no friction rub and no decreased pulses.    No murmur heard.  Pulses:       Carotid pulses are 3+ on the right side, and 3+ on the left side.       Radial pulses are 3+ on the right side, and 3+ on the left side.   Pulmonary/Chest: Effort normal and breath sounds normal. No respiratory distress. She has no wheezes. She has no rales. She exhibits no tenderness.   Abdominal: Soft. Bowel sounds are normal. She exhibits no distension, no abdominal bruit and no mass. There is no splenomegaly or hepatomegaly. There is no tenderness. There is no rebound and no guarding.   Musculoskeletal: Normal range of motion. She exhibits no edema, tenderness or deformity.   Lymphadenopathy:     She has no cervical adenopathy.   Neurological: She is alert. She has normal reflexes. No cranial nerve deficit. She exhibits normal muscle tone. Coordination normal.   Skin: Skin is warm and dry. No rash noted. She is not diaphoretic. No erythema.   Psychiatric: She has a normal mood and affect. Her behavior is normal. Judgment and thought content normal.         Lab Review  Lab Results   Component Value Date     01/09/2018    K 4.1 01/09/2018     01/09/2018    BUN 10 01/09/2018    CREATININE 0.78 01/09/2018    GLUCOSE 91 01/09/2018    CALCIUM 9.4 01/09/2018    ALT 23 01/09/2018    ALKPHOS 71 01/09/2018    LABIL2 2.0 01/09/2018     Lab Results   Component Value Date    CKTOTAL 64 01/09/2018     Lab Results   Component Value Date    WBC 4.07 (L) 01/09/2018    HGB 13.8 01/09/2018    HCT 42.0 01/09/2018     01/09/2018     Lab Results   Component  Value Date    INR 0.95 05/19/2014     No results found for: MG  Lab Results   Component Value Date    TSH 2.389 10/27/2016     No results found for: BNP  Lab Results   Component Value Date    CHLPL 195 05/09/2016    CHOL 168 01/09/2018    TRIG 139 01/09/2018    HDL 45 (L) 01/09/2018    LDLCALC 95 01/09/2018    VLDL 27.8 01/09/2018    LDLHDL 2.12 01/09/2018         Procedures       I personally viewed and interpreted the patient's LAB data         Assessment:     1. Mixed hyperlipidemia    2. Gastroesophageal reflux disease without esophagitis    3. Continuous leakage of urine    4. Age-related osteoporosis without current pathological fracture    5. Environmental allergies          Plan:      Labs reviewed and discussed with the patient  Lipids are normal.  Patient will continue Zocor and fish oil.    GI symptoms are better Protonix will be continued.  Stress incontinence is much better with Ditropan without any side effects Ditropan will be continued.  She will continue Singulair for environmental allergies.  Fosamax and vitamin D will be continued.  Overall patient sees be doing very well  Follow-up scheduled      Return in about 3 months (around 4/10/2018).

## 2018-01-15 PROBLEM — Z91.09 ENVIRONMENTAL ALLERGIES: Status: ACTIVE | Noted: 2018-01-15

## 2018-02-27 RX ORDER — MONTELUKAST SODIUM 10 MG/1
10 TABLET ORAL NIGHTLY
Qty: 90 TABLET | Refills: 0 | Status: SHIPPED | OUTPATIENT
Start: 2018-02-27 | End: 2018-06-26 | Stop reason: SDUPTHER

## 2018-02-27 NOTE — TELEPHONE ENCOUNTER
Patient called in requesting refill on medication:    Singular 10 mg take one tab po nightly #90 no refills     Faxed in to [facility] in [place]

## 2018-04-10 ENCOUNTER — OFFICE VISIT (OUTPATIENT)
Dept: CARDIOLOGY | Facility: CLINIC | Age: 73
End: 2018-04-10

## 2018-04-10 VITALS
DIASTOLIC BLOOD PRESSURE: 74 MMHG | HEART RATE: 82 BPM | HEIGHT: 66 IN | BODY MASS INDEX: 24.43 KG/M2 | WEIGHT: 152 LBS | OXYGEN SATURATION: 96 % | SYSTOLIC BLOOD PRESSURE: 136 MMHG

## 2018-04-10 DIAGNOSIS — R53.82 CHRONIC FATIGUE: ICD-10-CM

## 2018-04-10 DIAGNOSIS — K21.9 GASTROESOPHAGEAL REFLUX DISEASE WITHOUT ESOPHAGITIS: ICD-10-CM

## 2018-04-10 DIAGNOSIS — E55.9 VITAMIN D DEFICIENCY: ICD-10-CM

## 2018-04-10 DIAGNOSIS — M81.0 AGE-RELATED OSTEOPOROSIS WITHOUT CURRENT PATHOLOGICAL FRACTURE: ICD-10-CM

## 2018-04-10 DIAGNOSIS — Z12.39 BREAST CANCER SCREENING: ICD-10-CM

## 2018-04-10 DIAGNOSIS — E78.2 MIXED HYPERLIPIDEMIA: Primary | ICD-10-CM

## 2018-04-10 PROCEDURE — 99213 OFFICE O/P EST LOW 20 MIN: CPT | Performed by: INTERNAL MEDICINE

## 2018-04-10 RX ORDER — OXYBUTYNIN CHLORIDE 5 MG/1
5 TABLET ORAL
Qty: 180 TABLET | Refills: 3 | Status: SHIPPED | OUTPATIENT
Start: 2018-04-10 | End: 2019-01-08

## 2018-04-10 NOTE — PROGRESS NOTES
subjective     Chief Complaint   Patient presents with   • Hyperlipidemia   • Osteoporosis   • Follow-up     History of Present Illness  Patient is 72 years old white female who is here for a follow-up.  Patient has history of stress incontinence.  She is taking Ditropan 5 mg 2 a day with that bladder control is much better.  She still has some incontinence with coughing otherwise she is doing good.    Patient has mild osteoporosis and she is taking vitamin D3 along with Fosamax.  She is tolerating medications very well.    Hyperlipidemia is treated with Zocor and fish oil.  No side effects from Zocor.  She is also trying to diet.  She does not exercise much but she is fairly active.  Patient has some gastroesophageal reflux disorder with Protonix she seems to doing good.  Multiple environmental allergies taking Singulair on daily basis.    Overall patient feels that she is doing much better with current medications    Past Surgical History:   Procedure Laterality Date   • CARDIAC CATHETERIZATION  2014   • CARDIOVASCULAR STRESS TEST  2014   • ECHO - CONVERTED  2014   • ENDOSCOPY  2015   • HYSTERECTOMY  2002   • LAPAROSCOPIC CHOLECYSTECTOMY  2001     Family History   Problem Relation Age of Onset   • Heart attack Mother    • Heart failure Mother    • Hypertension Mother    • Heart attack Father    • Hypertension Father    • Heart block Brother    • Heart block Sister    • Heart block Sister    • Heart attack Brother    • Heart block Brother    • Stroke Brother    • Breast cancer Neg Hx      Past Medical History:   Diagnosis Date   • Hyperlipidemia    • Osteoporosis    • Tongue irritation    • Weight loss      Patient Active Problem List   Diagnosis   • Hyperlipidemia   • Osteoporosis   • Ganglion cyst   • Gastroesophageal reflux disease without esophagitis   • Vitamin D deficiency   • Continuous leakage of urine   • Cough   • Environmental allergies       Social History   Substance Use Topics   • Smoking status:  "Never Smoker   • Smokeless tobacco: Never Used   • Alcohol use No       Allergies   Allergen Reactions   • Latex        Current Outpatient Prescriptions on File Prior to Visit   Medication Sig   • aspirin 81 MG EC tablet Take 81 mg by mouth daily.   • cholecalciferol (VITAMIN D3) 1000 UNITS tablet Take 1,000 Units by mouth daily.   • montelukast (SINGULAIR) 10 MG tablet Take 1 tablet by mouth Every Night.   • Multiple Vitamin (MULTI VITAMIN DAILY PO) Take  by mouth.   • Omega-3 Fatty Acids (FISH OIL) 1000 MG capsule capsule Take 1,000 mg by mouth 2 (two) times a day with meals.   • oxybutynin (DITROPAN) 5 MG tablet Take 1 tablet by mouth 2 (Two) Times a Day.   • pantoprazole (PROTONIX) 40 MG EC tablet Take 1 tablet by mouth Daily.   • simvastatin (ZOCOR) 20 MG tablet Take 1 tablet by mouth Every Night.   • vitamin B-12 (CYANOCOBALAMIN) 1000 MCG tablet Take 1,000 mcg by mouth daily.   • [DISCONTINUED] alendronate (FOSAMAX) 70 MG tablet Take 1 tablet by mouth Every 7 (Seven) Days.     No current facility-administered medications on file prior to visit.          The following portions of the patient's history were reviewed and updated as appropriate: allergies, current medications, past family history, past medical history, past social history, past surgical history and problem list.    Review of Systems   Constitution: Negative.   HENT: Negative.  Negative for congestion.    Eyes: Negative.    Cardiovascular: Negative.  Negative for chest pain, cyanosis, dyspnea on exertion, irregular heartbeat, leg swelling, near-syncope, orthopnea, palpitations, paroxysmal nocturnal dyspnea and syncope.   Respiratory: Negative.  Negative for shortness of breath.    Hematologic/Lymphatic: Negative.    Musculoskeletal: Negative.    Gastrointestinal: Negative.    Neurological: Negative.  Negative for headaches.          Objective:     /74 (BP Location: Left arm, Patient Position: Sitting)   Pulse 82   Ht 167.6 cm (66\")   Wt " 68.9 kg (152 lb)   SpO2 96%   BMI 24.53 kg/m²   Physical Exam   Constitutional: She appears well-developed and well-nourished. No distress.   HENT:   Head: Normocephalic and atraumatic.   Mouth/Throat: Oropharynx is clear and moist. No oropharyngeal exudate.   Eyes: Conjunctivae and EOM are normal. Pupils are equal, round, and reactive to light. No scleral icterus.   Neck: Normal range of motion. Neck supple. No JVD present. No tracheal deviation present. No thyromegaly present.   Cardiovascular: Normal rate, regular rhythm, normal heart sounds and intact distal pulses.  PMI is not displaced.  Exam reveals no gallop, no friction rub and no decreased pulses.    No murmur heard.  Pulses:       Carotid pulses are 3+ on the right side, and 3+ on the left side.       Radial pulses are 3+ on the right side, and 3+ on the left side.   Pulmonary/Chest: Effort normal and breath sounds normal. No respiratory distress. She has no wheezes. She has no rales. She exhibits no tenderness.   Abdominal: Soft. Bowel sounds are normal. She exhibits no distension, no abdominal bruit and no mass. There is no splenomegaly or hepatomegaly. There is no tenderness. There is no rebound and no guarding.   Musculoskeletal: Normal range of motion. She exhibits no edema, tenderness or deformity.   Lymphadenopathy:     She has no cervical adenopathy.   Neurological: She is alert. She has normal reflexes. No cranial nerve deficit. She exhibits normal muscle tone. Coordination normal.   Skin: Skin is warm and dry. No rash noted. She is not diaphoretic. No erythema.   Psychiatric: She has a normal mood and affect. Her behavior is normal. Judgment and thought content normal.         Lab Review  Lab Results   Component Value Date     01/09/2018    K 4.1 01/09/2018     01/09/2018    BUN 10 01/09/2018    CREATININE 0.78 01/09/2018    GLUCOSE 91 01/09/2018    CALCIUM 9.4 01/09/2018    ALT 23 01/09/2018    ALKPHOS 71 01/09/2018    LABIL2 2.0  01/09/2018     Lab Results   Component Value Date    CKTOTAL 64 01/09/2018     Lab Results   Component Value Date    WBC 4.07 (L) 01/09/2018    HGB 13.8 01/09/2018    HCT 42.0 01/09/2018     01/09/2018     Lab Results   Component Value Date    INR 0.95 05/19/2014     No results found for: MG  Lab Results   Component Value Date    TSH 2.389 10/27/2016     No results found for: BNP  Lab Results   Component Value Date    CHLPL 195 05/09/2016    CHOL 168 01/09/2018    TRIG 139 01/09/2018    HDL 45 (L) 01/09/2018    VLDL 27.8 01/09/2018    LDLHDL 2.12 01/09/2018     Lab Results   Component Value Date    LDL 95 01/09/2018    LDL 77 08/15/2017       Procedures       I personally viewed and interpreted the patient's LAB data         Assessment:     1. Mixed hyperlipidemia    2. Vitamin D deficiency    3. Gastroesophageal reflux disease without esophagitis    4. Age-related osteoporosis without current pathological fracture          Plan:      Patient has been tolerating Zocor and fish oil very well.  Last cholesterol was 168 with triglyceride 139.  She will continue current medications lab work ordered for next visit.  Patient also has had the history of osteoporosis Fosamax and vitamin D will be continued.    GI symptoms are better.    Ditropan was continued.    Lab work scheduled for next visit.    Refills of medications were given.  Health maintenance issues were discussed.  Healthy lifestyle explained        No Follow-up on file.

## 2018-05-03 ENCOUNTER — OFFICE VISIT (OUTPATIENT)
Dept: CARDIOLOGY | Facility: CLINIC | Age: 73
End: 2018-05-03

## 2018-05-03 VITALS
SYSTOLIC BLOOD PRESSURE: 122 MMHG | HEIGHT: 66 IN | WEIGHT: 150 LBS | BODY MASS INDEX: 24.11 KG/M2 | OXYGEN SATURATION: 96 % | HEART RATE: 81 BPM | DIASTOLIC BLOOD PRESSURE: 74 MMHG

## 2018-05-03 DIAGNOSIS — J06.9 ACUTE URI: Primary | ICD-10-CM

## 2018-05-03 DIAGNOSIS — R05.9 COUGH: ICD-10-CM

## 2018-05-03 PROCEDURE — 99213 OFFICE O/P EST LOW 20 MIN: CPT | Performed by: INTERNAL MEDICINE

## 2018-05-03 PROCEDURE — 96372 THER/PROPH/DIAG INJ SC/IM: CPT | Performed by: INTERNAL MEDICINE

## 2018-05-03 RX ORDER — CEFDINIR 300 MG/1
300 CAPSULE ORAL 2 TIMES DAILY
Qty: 14 CAPSULE | Refills: 0 | Status: SHIPPED | OUTPATIENT
Start: 2018-05-03 | End: 2018-07-10

## 2018-05-03 RX ORDER — CEFTRIAXONE 500 MG/1
500 INJECTION, POWDER, FOR SOLUTION INTRAMUSCULAR; INTRAVENOUS ONCE
Status: COMPLETED | OUTPATIENT
Start: 2018-05-03 | End: 2018-05-03

## 2018-05-03 RX ADMIN — CEFTRIAXONE 500 MG: 500 INJECTION, POWDER, FOR SOLUTION INTRAMUSCULAR; INTRAVENOUS at 11:58

## 2018-05-03 NOTE — PROGRESS NOTES
subjective     Chief Complaint   Patient presents with   • Cough     Productive   • Bronchitis     History of Present Illness  Patient is 72 years old white female who state that she has been having symptoms of upper respiratory tract infection.  She has been coughing and sore throat and productive cough.  She has yellow sputum.  No fever or chills.  This has been going on for almost 4 days.  When she coughs she has some soreness in the anterior chest.  No hemoptysis    Past Surgical History:   Procedure Laterality Date   • CARDIAC CATHETERIZATION  2014   • CARDIOVASCULAR STRESS TEST  2014   • ECHO - CONVERTED  2014   • ENDOSCOPY  2015   • HYSTERECTOMY  2002    benign   • LAPAROSCOPIC CHOLECYSTECTOMY  2001     Family History   Problem Relation Age of Onset   • Heart attack Mother    • Heart failure Mother    • Hypertension Mother    • Heart attack Father    • Hypertension Father    • Heart block Brother    • Heart block Sister    • Heart block Sister    • Heart attack Brother    • Heart block Brother    • Stroke Brother    • Breast cancer Neg Hx      Past Medical History:   Diagnosis Date   • Hyperlipidemia    • Osteoporosis    • Tongue irritation    • Weight loss      Patient Active Problem List   Diagnosis   • Hyperlipidemia   • Osteoporosis   • Ganglion cyst   • Gastroesophageal reflux disease without esophagitis   • Vitamin D deficiency   • Continuous leakage of urine   • Cough   • Environmental allergies   • URI, acute       Social History   Substance Use Topics   • Smoking status: Never Smoker   • Smokeless tobacco: Never Used   • Alcohol use No       Allergies   Allergen Reactions   • Latex        Current Outpatient Prescriptions on File Prior to Visit   Medication Sig   • aspirin 81 MG EC tablet Take 81 mg by mouth daily.   • cholecalciferol (VITAMIN D3) 1000 UNITS tablet Take 1,000 Units by mouth daily.   • montelukast (SINGULAIR) 10 MG tablet Take 1 tablet by mouth Every Night.   • Multiple Vitamin (MULTI  "VITAMIN DAILY PO) Take  by mouth.   • Omega-3 Fatty Acids (FISH OIL) 1000 MG capsule capsule Take 1,000 mg by mouth 2 (two) times a day with meals.   • oxybutynin (DITROPAN) 5 MG tablet Take 1 tablet by mouth 2 (Two) Times a Day.   • pantoprazole (PROTONIX) 40 MG EC tablet Take 1 tablet by mouth Daily.   • simvastatin (ZOCOR) 20 MG tablet Take 1 tablet by mouth Every Night.   • vitamin B-12 (CYANOCOBALAMIN) 1000 MCG tablet Take 1,000 mcg by mouth daily.     No current facility-administered medications on file prior to visit.          The following portions of the patient's history were reviewed and updated as appropriate: allergies, current medications, past family history, past medical history, past social history, past surgical history and problem list.    Review of Systems   Constitution: Positive for weakness and malaise/fatigue. Negative for chills and fever.   HENT: Positive for congestion.    Cardiovascular: Negative.    Respiratory: Positive for cough and sputum production. Negative for hemoptysis, shortness of breath, sleep disturbances due to breathing, snoring and wheezing.    Endocrine: Negative.    Musculoskeletal: Negative.           Objective:     /74 (BP Location: Left arm, Patient Position: Sitting)   Pulse 81   Ht 167.6 cm (66\")   Wt 68 kg (150 lb)   SpO2 96%   BMI 24.21 kg/m²   Physical Exam   Constitutional: She appears well-developed and well-nourished. No distress.   HENT:   Head: Normocephalic and atraumatic.   Mouth/Throat: Oropharyngeal exudate present.   Eyes: Conjunctivae and EOM are normal. Pupils are equal, round, and reactive to light. No scleral icterus.   Neck: Normal range of motion. Neck supple. No JVD present. No tracheal deviation present. No thyromegaly present.   Cardiovascular: Normal rate, regular rhythm, normal heart sounds and intact distal pulses.  PMI is not displaced.  Exam reveals no gallop, no friction rub and no decreased pulses.    No murmur " heard.  Pulses:       Carotid pulses are 3+ on the right side, and 3+ on the left side.       Radial pulses are 3+ on the right side, and 3+ on the left side.   Pulmonary/Chest: Effort normal and breath sounds normal. No respiratory distress. She has no wheezes. She has no rales. She exhibits no tenderness.   Abdominal: Soft. Bowel sounds are normal. She exhibits no distension, no abdominal bruit and no mass. There is no splenomegaly or hepatomegaly. There is no tenderness. There is no rebound and no guarding.   Musculoskeletal: Normal range of motion. She exhibits no edema, tenderness or deformity.   Lymphadenopathy:     She has no cervical adenopathy.   Neurological: She is alert. She has normal reflexes. No cranial nerve deficit. She exhibits normal muscle tone. Coordination normal.   Skin: Skin is warm and dry. No rash noted. She is not diaphoretic. No erythema.   Psychiatric: She has a normal mood and affect. Her behavior is normal. Judgment and thought content normal.         Lab Review  Lab Results   Component Value Date     01/09/2018    K 4.1 01/09/2018     01/09/2018    BUN 10 01/09/2018    CREATININE 0.78 01/09/2018    GLUCOSE 91 01/09/2018    CALCIUM 9.4 01/09/2018    ALT 23 01/09/2018    ALKPHOS 71 01/09/2018    LABIL2 2.0 01/09/2018     Lab Results   Component Value Date    CKTOTAL 64 01/09/2018     Lab Results   Component Value Date    WBC 4.07 (L) 01/09/2018    HGB 13.8 01/09/2018    HCT 42.0 01/09/2018     01/09/2018     Lab Results   Component Value Date    INR 0.95 05/19/2014     No results found for: MG  Lab Results   Component Value Date    TSH 2.389 10/27/2016     No results found for: BNP  Lab Results   Component Value Date    CHLPL 195 05/09/2016    CHOL 168 01/09/2018    TRIG 139 01/09/2018    HDL 45 (L) 01/09/2018    VLDL 27.8 01/09/2018    LDLHDL 2.12 01/09/2018     Lab Results   Component Value Date    LDL 95 01/09/2018    LDL 77 08/15/2017       Procedures       I  personally viewed and interpreted the patient's LAB data         Assessment:     1. Acute URI    2. Cough          Plan:      Patient has acute bronchitis of last 4 days duration.  Patient is afebrile at this time.  Lungs are clear throat is injected patient was given injection Rocephin.  She will take Singulair, Omnicef.  No chest x-ray was done at this time.  If patient does not improve within next 24 hours she will let me know and we will get lab work and chest x-ray.        No Follow-up on file.

## 2018-05-04 ENCOUNTER — HOSPITAL ENCOUNTER (OUTPATIENT)
Dept: MAMMOGRAPHY | Facility: HOSPITAL | Age: 73
Discharge: HOME | End: 2018-05-04
Admitting: INTERNAL MEDICINE

## 2018-05-04 DIAGNOSIS — Z12.39 BREAST CANCER SCREENING: ICD-10-CM

## 2018-05-04 PROCEDURE — 77063 BREAST TOMOSYNTHESIS BI: CPT | Performed by: RADIOLOGY

## 2018-05-04 PROCEDURE — 77063 BREAST TOMOSYNTHESIS BI: CPT

## 2018-05-04 PROCEDURE — 77067 SCR MAMMO BI INCL CAD: CPT | Performed by: RADIOLOGY

## 2018-05-04 PROCEDURE — 77067 SCR MAMMO BI INCL CAD: CPT

## 2018-05-07 ENCOUNTER — EPISODE CHANGES (OUTPATIENT)
Dept: CASE MANAGEMENT | Facility: OTHER | Age: 73
End: 2018-05-07

## 2018-06-26 RX ORDER — MONTELUKAST SODIUM 10 MG/1
10 TABLET ORAL NIGHTLY
Qty: 90 TABLET | Refills: 3 | Status: SHIPPED | OUTPATIENT
Start: 2018-06-26 | End: 2019-06-24 | Stop reason: SDUPTHER

## 2018-06-26 RX ORDER — SIMVASTATIN 20 MG
20 TABLET ORAL NIGHTLY
Qty: 90 TABLET | Refills: 3 | Status: SHIPPED | OUTPATIENT
Start: 2018-06-26 | End: 2019-10-01 | Stop reason: SDUPTHER

## 2018-07-06 ENCOUNTER — LAB (OUTPATIENT)
Dept: LAB | Facility: HOSPITAL | Age: 73
End: 2018-07-06

## 2018-07-06 DIAGNOSIS — E78.2 MIXED HYPERLIPIDEMIA: ICD-10-CM

## 2018-07-06 DIAGNOSIS — E55.9 VITAMIN D DEFICIENCY: ICD-10-CM

## 2018-07-06 DIAGNOSIS — R53.82 CHRONIC FATIGUE: ICD-10-CM

## 2018-07-06 LAB
25(OH)D3 SERPL-MCNC: 34 NG/ML
ALBUMIN SERPL-MCNC: 4.5 G/DL (ref 3.4–4.8)
ALBUMIN/GLOB SERPL: 2 G/DL (ref 1.5–2.5)
ALP SERPL-CCNC: 77 U/L (ref 35–104)
ALT SERPL W P-5'-P-CCNC: 26 U/L (ref 10–36)
ANION GAP SERPL CALCULATED.3IONS-SCNC: 6.5 MMOL/L (ref 3.6–11.2)
AST SERPL-CCNC: 24 U/L (ref 10–30)
BASOPHILS # BLD AUTO: 0.03 10*3/MM3 (ref 0–0.3)
BASOPHILS NFR BLD AUTO: 1 % (ref 0–2)
BILIRUB SERPL-MCNC: 0.7 MG/DL (ref 0.2–1.8)
BUN BLD-MCNC: 14 MG/DL (ref 7–21)
BUN/CREAT SERPL: 17.3 (ref 7–25)
CALCIUM SPEC-SCNC: 9.4 MG/DL (ref 7.7–10)
CHLORIDE SERPL-SCNC: 106 MMOL/L (ref 99–112)
CHOLEST SERPL-MCNC: 170 MG/DL (ref 0–200)
CK SERPL-CCNC: 113 U/L (ref 24–173)
CO2 SERPL-SCNC: 28.5 MMOL/L (ref 24.3–31.9)
CREAT BLD-MCNC: 0.81 MG/DL (ref 0.43–1.29)
DEPRECATED RDW RBC AUTO: 41.7 FL (ref 37–54)
EOSINOPHIL # BLD AUTO: 0.14 10*3/MM3 (ref 0–0.7)
EOSINOPHIL NFR BLD AUTO: 4.7 % (ref 0–7)
ERYTHROCYTE [DISTWIDTH] IN BLOOD BY AUTOMATED COUNT: 12.7 % (ref 11.5–14.5)
GFR SERPL CREATININE-BSD FRML MDRD: 69 ML/MIN/1.73
GLOBULIN UR ELPH-MCNC: 2.2 GM/DL
GLUCOSE BLD-MCNC: 93 MG/DL (ref 70–110)
HCT VFR BLD AUTO: 41.6 % (ref 37–47)
HDLC SERPL-MCNC: 50 MG/DL (ref 60–100)
HGB BLD-MCNC: 13.8 G/DL (ref 12–16)
IMM GRANULOCYTES # BLD: 0 10*3/MM3 (ref 0–0.03)
IMM GRANULOCYTES NFR BLD: 0 % (ref 0–0.5)
LDLC SERPL CALC-MCNC: 99 MG/DL (ref 0–100)
LDLC/HDLC SERPL: 1.98 {RATIO}
LYMPHOCYTES # BLD AUTO: 1.15 10*3/MM3 (ref 1–3)
LYMPHOCYTES NFR BLD AUTO: 38.5 % (ref 16–46)
MCH RBC QN AUTO: 30.5 PG (ref 27–33)
MCHC RBC AUTO-ENTMCNC: 33.2 G/DL (ref 33–37)
MCV RBC AUTO: 91.8 FL (ref 80–94)
MONOCYTES # BLD AUTO: 0.35 10*3/MM3 (ref 0.1–0.9)
MONOCYTES NFR BLD AUTO: 11.7 % (ref 0–12)
NEUTROPHILS # BLD AUTO: 1.32 10*3/MM3 (ref 1.4–6.5)
NEUTROPHILS NFR BLD AUTO: 44.1 % (ref 40–75)
OSMOLALITY SERPL CALC.SUM OF ELEC: 281.4 MOSM/KG (ref 273–305)
PLATELET # BLD AUTO: 202 10*3/MM3 (ref 130–400)
PMV BLD AUTO: 11 FL (ref 6–10)
POTASSIUM BLD-SCNC: 4.1 MMOL/L (ref 3.5–5.3)
PROT SERPL-MCNC: 6.7 G/DL (ref 6–8)
RBC # BLD AUTO: 4.53 10*6/MM3 (ref 4.2–5.4)
SODIUM BLD-SCNC: 141 MMOL/L (ref 135–153)
TRIGL SERPL-MCNC: 105 MG/DL (ref 0–150)
VIT B12 BLD-MCNC: 825 PG/ML (ref 211–911)
VLDLC SERPL-MCNC: 21 MG/DL
WBC NRBC COR # BLD: 2.99 10*3/MM3 (ref 4.5–12.5)

## 2018-07-06 PROCEDURE — 85025 COMPLETE CBC W/AUTO DIFF WBC: CPT

## 2018-07-06 PROCEDURE — 80053 COMPREHEN METABOLIC PANEL: CPT

## 2018-07-06 PROCEDURE — 82607 VITAMIN B-12: CPT

## 2018-07-06 PROCEDURE — 82550 ASSAY OF CK (CPK): CPT

## 2018-07-06 PROCEDURE — 80061 LIPID PANEL: CPT

## 2018-07-06 PROCEDURE — 82306 VITAMIN D 25 HYDROXY: CPT

## 2018-07-10 ENCOUNTER — OFFICE VISIT (OUTPATIENT)
Dept: CARDIOLOGY | Facility: CLINIC | Age: 73
End: 2018-07-10

## 2018-07-10 VITALS
HEART RATE: 72 BPM | HEIGHT: 66 IN | DIASTOLIC BLOOD PRESSURE: 78 MMHG | SYSTOLIC BLOOD PRESSURE: 134 MMHG | OXYGEN SATURATION: 98 % | BODY MASS INDEX: 23.63 KG/M2 | WEIGHT: 147 LBS

## 2018-07-10 DIAGNOSIS — R07.9 CHEST PAIN IN ADULT: Primary | ICD-10-CM

## 2018-07-10 DIAGNOSIS — K21.9 GASTROESOPHAGEAL REFLUX DISEASE WITHOUT ESOPHAGITIS: ICD-10-CM

## 2018-07-10 DIAGNOSIS — E78.2 MIXED HYPERLIPIDEMIA: ICD-10-CM

## 2018-07-10 PROCEDURE — 99214 OFFICE O/P EST MOD 30 MIN: CPT | Performed by: INTERNAL MEDICINE

## 2018-07-10 PROCEDURE — 93000 ELECTROCARDIOGRAM COMPLETE: CPT | Performed by: INTERNAL MEDICINE

## 2018-07-10 NOTE — PROGRESS NOTES
subjective     Chief Complaint   Patient presents with   • Hyperlipidemia   • Heartburn   • Follow-up   • Results     LABS     History of Present Illness  Patient is 73 years old white female who presents to the office complaining of chest pain which is located under the right breast and substernal area.  Pain is described as tight heavy feeling with some radiation to the upper chest and shoulder.  Patient is quite concerned about her heart.  She states that his strong family history and she wants to make sure there is no cardiac issues.  Patient had coronary angiography done 4 years ago which was normal at that time.    She has history of hyperlipidemia and is taking Zocor and fish oil.    Patient also states that she has gastroesophageal reflux disorder.  She is taking Protonix    Patient coughs with thick phlegm.  No fever or chills no hemoptysis.  She does not smoke.  She has multiple environmental allergies and is taking Singulair    Patient also had lab work which will be reviewed and discussed with the patient    Past Surgical History:   Procedure Laterality Date   • CARDIAC CATHETERIZATION  2014   • CARDIOVASCULAR STRESS TEST  2014   • ECHO - CONVERTED  2014   • ENDOSCOPY  2015   • HYSTERECTOMY  2002    benign   • LAPAROSCOPIC CHOLECYSTECTOMY  2001     Family History   Problem Relation Age of Onset   • Heart attack Mother    • Heart failure Mother    • Hypertension Mother    • Heart attack Father    • Hypertension Father    • Heart block Brother    • Heart block Sister    • Heart block Sister    • Heart attack Brother    • Heart block Brother    • Stroke Brother    • Breast cancer Neg Hx      Past Medical History:   Diagnosis Date   • Hyperlipidemia    • Osteoporosis    • Tongue irritation    • Weight loss      Patient Active Problem List   Diagnosis   • Hyperlipidemia   • Osteoporosis   • Ganglion cyst   • Gastroesophageal reflux disease without esophagitis   • Vitamin D deficiency   • Continuous leakage of  urine   • Cough   • Environmental allergies   • URI, acute   • Chest pain in adult       Social History   Substance Use Topics   • Smoking status: Never Smoker   • Smokeless tobacco: Never Used   • Alcohol use No       Allergies   Allergen Reactions   • Latex        Current Outpatient Prescriptions on File Prior to Visit   Medication Sig   • aspirin 81 MG EC tablet Take 81 mg by mouth daily.   • cholecalciferol (VITAMIN D3) 1000 UNITS tablet Take 1,000 Units by mouth daily.   • montelukast (SINGULAIR) 10 MG tablet Take 1 tablet by mouth Every Night.   • Multiple Vitamin (MULTI VITAMIN DAILY PO) Take  by mouth.   • Omega-3 Fatty Acids (FISH OIL) 1000 MG capsule capsule Take 1,000 mg by mouth 2 (two) times a day with meals.   • oxybutynin (DITROPAN) 5 MG tablet Take 1 tablet by mouth 2 (Two) Times a Day.   • pantoprazole (PROTONIX) 40 MG EC tablet Take 1 tablet by mouth Daily.   • simvastatin (ZOCOR) 20 MG tablet Take 1 tablet by mouth Every Night.   • vitamin B-12 (CYANOCOBALAMIN) 1000 MCG tablet Take 1,000 mcg by mouth daily.   • [DISCONTINUED] cefdinir (OMNICEF) 300 MG capsule Take 1 capsule by mouth 2 (Two) Times a Day.     No current facility-administered medications on file prior to visit.          The following portions of the patient's history were reviewed and updated as appropriate: allergies, current medications, past family history, past medical history, past social history, past surgical history and problem list.    Review of Systems   Constitution: Negative.   HENT: Negative.  Negative for congestion.    Eyes: Negative.    Cardiovascular: Positive for chest pain and dyspnea on exertion. Negative for cyanosis, irregular heartbeat, leg swelling, near-syncope, orthopnea, palpitations, paroxysmal nocturnal dyspnea and syncope.   Respiratory: Positive for shortness of breath.    Hematologic/Lymphatic: Negative.    Musculoskeletal: Negative.    Gastrointestinal: Positive for heartburn.   Neurological:  "Negative.  Negative for headaches.          Objective:     /78 (BP Location: Left arm, Patient Position: Sitting)   Pulse 72   Ht 167.6 cm (66\")   Wt 66.7 kg (147 lb)   SpO2 98%   BMI 23.73 kg/m²   Physical Exam   Constitutional: She appears well-developed and well-nourished. No distress.   HENT:   Head: Normocephalic and atraumatic.   Mouth/Throat: Oropharynx is clear and moist. No oropharyngeal exudate.   Eyes: Conjunctivae and EOM are normal. Pupils are equal, round, and reactive to light. No scleral icterus.   Neck: Normal range of motion. Neck supple. No JVD present. No tracheal deviation present. No thyromegaly present.   Cardiovascular: Normal rate, regular rhythm, normal heart sounds and intact distal pulses.  PMI is not displaced.  Exam reveals no gallop, no friction rub and no decreased pulses.    No murmur heard.  Pulses:       Carotid pulses are 3+ on the right side, and 3+ on the left side.       Radial pulses are 3+ on the right side, and 3+ on the left side.   Pulmonary/Chest: Effort normal and breath sounds normal. No respiratory distress. She has no wheezes. She has no rales. She exhibits no tenderness.   Abdominal: Soft. Bowel sounds are normal. She exhibits no distension, no abdominal bruit and no mass. There is no splenomegaly or hepatomegaly. There is no tenderness. There is no rebound and no guarding.   Musculoskeletal: Normal range of motion. She exhibits no edema, tenderness or deformity.   Lymphadenopathy:     She has no cervical adenopathy.   Neurological: She is alert. She has normal reflexes. No cranial nerve deficit. She exhibits normal muscle tone. Coordination normal.   Skin: Skin is warm and dry. No rash noted. She is not diaphoretic. No erythema.   Psychiatric: She has a normal mood and affect. Her behavior is normal. Judgment and thought content normal.         Lab Review  Lab Results   Component Value Date     07/06/2018    K 4.1 07/06/2018     07/06/2018    " BUN 14 07/06/2018    CREATININE 0.81 07/06/2018    GLUCOSE 93 07/06/2018    CALCIUM 9.4 07/06/2018    ALT 26 07/06/2018    ALKPHOS 77 07/06/2018    LABIL2 2.0 07/06/2018     Lab Results   Component Value Date    CKTOTAL 113 07/06/2018     Lab Results   Component Value Date    WBC 2.99 (L) 07/06/2018    HGB 13.8 07/06/2018    HCT 41.6 07/06/2018     07/06/2018     Lab Results   Component Value Date    INR 0.95 05/19/2014     No results found for: MG  Lab Results   Component Value Date    TSH 2.389 10/27/2016     No results found for: BNP  Lab Results   Component Value Date    CHLPL 195 05/09/2016    CHOL 170 07/06/2018    TRIG 105 07/06/2018    HDL 50 (L) 07/06/2018    VLDL 21 07/06/2018    LDLHDL 1.98 07/06/2018     Lab Results   Component Value Date    LDL 99 07/06/2018    LDL 95 01/09/2018         ECG 12 Lead  Date/Time: 7/10/2018 5:28 PM  Performed by: TESSY POZO  Authorized by: TESSY POZO   Comparison: compared with previous ECG from 11/1/2015  Similar to previous ECG  Rhythm: sinus rhythm  Ectopy: atrial premature contractions  Rate: normal  BPM: 66  Conduction: conduction normal  ST Segments: ST segments normal  T Waves: T waves normal  QRS axis: normal  Clinical impression: abnormal ECG               I personally viewed and interpreted the patient's LAB data         Assessment:     1. Chest pain in adult    2. Mixed hyperlipidemia    3. Gastroesophageal reflux disease without esophagitis          Plan:      Lab work reviewed and discussed with the patient.  CBC, CMP and lipid panel is normal except for mild leukopenia.    Chest pain is atypical and probably not cardiac.  EKG does not show any acute changes except for one PAC.    Cardiac workup was scheduled.  Chest x-ray was also scheduled.  In both the studies do not come up with a diagnosis will need GI consult and pulmonary consult.  Aggressive risk factor modification emphasized.          No Follow-up on file.

## 2018-08-09 ENCOUNTER — HOSPITAL ENCOUNTER (OUTPATIENT)
Dept: NUCLEAR MEDICINE | Facility: HOSPITAL | Age: 73
Discharge: HOME | End: 2018-08-09
Attending: INTERNAL MEDICINE

## 2018-08-09 ENCOUNTER — HOSPITAL ENCOUNTER (OUTPATIENT)
Dept: CARDIOLOGY | Facility: HOSPITAL | Age: 73
Discharge: HOME | End: 2018-08-09
Attending: INTERNAL MEDICINE

## 2018-08-09 DIAGNOSIS — R07.9 CHEST PAIN IN ADULT: ICD-10-CM

## 2018-08-09 PROCEDURE — 0 TECHNETIUM SESTAMIBI: Performed by: INTERNAL MEDICINE

## 2018-08-09 PROCEDURE — A9500 TC99M SESTAMIBI: HCPCS | Performed by: INTERNAL MEDICINE

## 2018-08-09 PROCEDURE — 93017 CV STRESS TEST TRACING ONLY: CPT

## 2018-08-09 PROCEDURE — 93306 TTE W/DOPPLER COMPLETE: CPT | Performed by: INTERNAL MEDICINE

## 2018-08-09 PROCEDURE — 78452 HT MUSCLE IMAGE SPECT MULT: CPT | Performed by: INTERNAL MEDICINE

## 2018-08-09 PROCEDURE — 93018 CV STRESS TEST I&R ONLY: CPT | Performed by: INTERNAL MEDICINE

## 2018-08-09 PROCEDURE — 93306 TTE W/DOPPLER COMPLETE: CPT

## 2018-08-09 PROCEDURE — 78452 HT MUSCLE IMAGE SPECT MULT: CPT

## 2018-08-09 RX ADMIN — TECHNETIUM TC 99M SESTAMIBI 1 DOSE: 1 INJECTION INTRAVENOUS at 09:08

## 2018-08-09 RX ADMIN — TECHNETIUM TC 99M SESTAMIBI 1 DOSE: 1 INJECTION INTRAVENOUS at 11:20

## 2018-08-10 LAB
BH CV ECHO MEAS - % IVS THICK: 25 %
BH CV ECHO MEAS - % LVPW THICK: 30 %
BH CV ECHO MEAS - ACS: 1.9 CM
BH CV ECHO MEAS - AI DEC SLOPE: 139.5 CM/SEC^2
BH CV ECHO MEAS - AI MAX PG: 33.1 MMHG
BH CV ECHO MEAS - AI MAX VEL: 287.8 CM/SEC
BH CV ECHO MEAS - AI P1/2T: 604 MSEC
BH CV ECHO MEAS - AO MAX PG (FULL): 3.5 MMHG
BH CV ECHO MEAS - AO MAX PG: 7.7 MMHG
BH CV ECHO MEAS - AO MEAN PG (FULL): 2.6 MMHG
BH CV ECHO MEAS - AO MEAN PG: 4.4 MMHG
BH CV ECHO MEAS - AO ROOT AREA (BSA CORRECTED): 1.7
BH CV ECHO MEAS - AO ROOT AREA: 7.3 CM^2
BH CV ECHO MEAS - AO ROOT DIAM: 3 CM
BH CV ECHO MEAS - AO V2 MAX: 138.5 CM/SEC
BH CV ECHO MEAS - AO V2 MEAN: 97.8 CM/SEC
BH CV ECHO MEAS - AO V2 VTI: 33.4 CM
BH CV ECHO MEAS - AVA(I,A): 1.7 CM^2
BH CV ECHO MEAS - AVA(I,D): 1.7 CM^2
BH CV ECHO MEAS - AVA(V,A): 1.8 CM^2
BH CV ECHO MEAS - AVA(V,D): 1.8 CM^2
BH CV ECHO MEAS - BSA(HAYCOCK): 1.7 M^2
BH CV ECHO MEAS - BSA: 1.7 M^2
BH CV ECHO MEAS - BZI_BMI: 23.2 KILOGRAMS/M^2
BH CV ECHO MEAS - BZI_METRIC_HEIGHT: 167.6 CM
BH CV ECHO MEAS - BZI_METRIC_WEIGHT: 65.3 KG
BH CV ECHO MEAS - EDV(CUBED): 126.7 ML
BH CV ECHO MEAS - EDV(TEICH): 119.5 ML
BH CV ECHO MEAS - EF(CUBED): 67.7 %
BH CV ECHO MEAS - EF(TEICH): 59 %
BH CV ECHO MEAS - ESV(CUBED): 40.9 ML
BH CV ECHO MEAS - ESV(TEICH): 49 ML
BH CV ECHO MEAS - FS: 31.4 %
BH CV ECHO MEAS - IVS/LVPW: 0.93
BH CV ECHO MEAS - IVSD: 1 CM
BH CV ECHO MEAS - IVSS: 1.3 CM
BH CV ECHO MEAS - LA DIMENSION: 3.5 CM
BH CV ECHO MEAS - LA/AO: 1.2
BH CV ECHO MEAS - LV MASS(C)D: 199.2 GRAMS
BH CV ECHO MEAS - LV MASS(C)DI: 114.5 GRAMS/M^2
BH CV ECHO MEAS - LV MASS(C)S: 161 GRAMS
BH CV ECHO MEAS - LV MASS(C)SI: 92.5 GRAMS/M^2
BH CV ECHO MEAS - LV MAX PG: 4.2 MMHG
BH CV ECHO MEAS - LV MEAN PG: 1.8 MMHG
BH CV ECHO MEAS - LV V1 MAX: 102.7 CM/SEC
BH CV ECHO MEAS - LV V1 MEAN: 61.3 CM/SEC
BH CV ECHO MEAS - LV V1 VTI: 23.3 CM
BH CV ECHO MEAS - LVIDD: 5 CM
BH CV ECHO MEAS - LVIDS: 3.4 CM
BH CV ECHO MEAS - LVOT AREA (M): 2.5 CM^2
BH CV ECHO MEAS - LVOT AREA: 2.4 CM^2
BH CV ECHO MEAS - LVOT DIAM: 1.8 CM
BH CV ECHO MEAS - LVPWD: 1.1 CM
BH CV ECHO MEAS - LVPWS: 1.4 CM
BH CV ECHO MEAS - MV A MAX VEL: 92.3 CM/SEC
BH CV ECHO MEAS - MV DEC SLOPE: 265.2 CM/SEC^2
BH CV ECHO MEAS - MV E MAX VEL: 69.1 CM/SEC
BH CV ECHO MEAS - MV E/A: 0.75
BH CV ECHO MEAS - MV P1/2T MAX VEL: 70.1 CM/SEC
BH CV ECHO MEAS - MV P1/2T: 77.4 MSEC
BH CV ECHO MEAS - MVA P1/2T LCG: 3.1 CM^2
BH CV ECHO MEAS - MVA(P1/2T): 2.8 CM^2
BH CV ECHO MEAS - PA ACC SLOPE: 1015 CM/SEC^2
BH CV ECHO MEAS - PA ACC TIME: 0.11 SEC
BH CV ECHO MEAS - PA PR(ACCEL): 31.5 MMHG
BH CV ECHO MEAS - RAP SYSTOLE: 10 MMHG
BH CV ECHO MEAS - RVDD: 2.9 CM
BH CV ECHO MEAS - RVSP: 37.5 MMHG
BH CV ECHO MEAS - SI(AO): 139.7 ML/M^2
BH CV ECHO MEAS - SI(CUBED): 49.3 ML/M^2
BH CV ECHO MEAS - SI(LVOT): 32.4 ML/M^2
BH CV ECHO MEAS - SI(TEICH): 40.5 ML/M^2
BH CV ECHO MEAS - SV(AO): 243 ML
BH CV ECHO MEAS - SV(CUBED): 85.8 ML
BH CV ECHO MEAS - SV(LVOT): 56.3 ML
BH CV ECHO MEAS - SV(TEICH): 70.5 ML
BH CV ECHO MEAS - TR MAX VEL: 262 CM/SEC
BH CV NUCLEAR PRIOR STUDY: 3
BH CV STRESS BP STAGE 1: NORMAL
BH CV STRESS BP STAGE 2: NORMAL
BH CV STRESS DURATION MIN STAGE 1: 3
BH CV STRESS DURATION MIN STAGE 2: 3
BH CV STRESS DURATION SEC STAGE 1: 0
BH CV STRESS DURATION SEC STAGE 2: 0
BH CV STRESS GRADE STAGE 1: 10
BH CV STRESS GRADE STAGE 2: 12
BH CV STRESS HR STAGE 1: 119
BH CV STRESS HR STAGE 2: 148
BH CV STRESS METS STAGE 1: 5
BH CV STRESS METS STAGE 2: 7.5
BH CV STRESS PROTOCOL 1: NORMAL
BH CV STRESS RECOVERY BP: NORMAL MMHG
BH CV STRESS RECOVERY HR: 73 BPM
BH CV STRESS SPEED STAGE 1: 1.7
BH CV STRESS SPEED STAGE 2: 2.5
BH CV STRESS STAGE 1: 1
BH CV STRESS STAGE 2: 2
LV EF NUC BP: 79 %
MAXIMAL PREDICTED HEART RATE: 146 BPM
MAXIMAL PREDICTED HEART RATE: 146 BPM
PERCENT MAX PREDICTED HR: 101.37 %
STRESS BASELINE BP: NORMAL MMHG
STRESS BASELINE HR: 79 BPM
STRESS PERCENT HR: 119 %
STRESS POST ESTIMATED WORKLOAD: 7 METS
STRESS POST EXERCISE DUR MIN: 4 MIN
STRESS POST EXERCISE DUR SEC: 0 SEC
STRESS POST PEAK BP: NORMAL MMHG
STRESS POST PEAK HR: 148 BPM
STRESS TARGET HR: 124 BPM
STRESS TARGET HR: 124 BPM

## 2018-08-14 RX ORDER — PANTOPRAZOLE SODIUM 40 MG/1
40 TABLET, DELAYED RELEASE ORAL DAILY
Qty: 90 TABLET | Refills: 0 | Status: SHIPPED | OUTPATIENT
Start: 2018-08-14 | End: 2018-11-12 | Stop reason: SDUPTHER

## 2018-10-09 ENCOUNTER — OFFICE VISIT (OUTPATIENT)
Dept: CARDIOLOGY | Facility: CLINIC | Age: 73
End: 2018-10-09

## 2018-10-09 VITALS
WEIGHT: 149.8 LBS | SYSTOLIC BLOOD PRESSURE: 111 MMHG | DIASTOLIC BLOOD PRESSURE: 76 MMHG | HEIGHT: 66 IN | HEART RATE: 78 BPM | RESPIRATION RATE: 16 BRPM | OXYGEN SATURATION: 99 % | BODY MASS INDEX: 24.08 KG/M2

## 2018-10-09 DIAGNOSIS — R68.2 DRY MOUTH, UNSPECIFIED: ICD-10-CM

## 2018-10-09 DIAGNOSIS — E78.2 MIXED HYPERLIPIDEMIA: ICD-10-CM

## 2018-10-09 DIAGNOSIS — N39.45 CONTINUOUS LEAKAGE OF URINE: ICD-10-CM

## 2018-10-09 DIAGNOSIS — K11.7 XEROSTOMIA: Primary | ICD-10-CM

## 2018-10-09 DIAGNOSIS — Z23 NEED FOR PROPHYLACTIC VACCINATION AGAINST STREPTOCOCCUS PNEUMONIAE (PNEUMOCOCCUS) AND INFLUENZA: ICD-10-CM

## 2018-10-09 DIAGNOSIS — Z23 NEED FOR VACCINATION: ICD-10-CM

## 2018-10-09 DIAGNOSIS — K21.9 GASTROESOPHAGEAL REFLUX DISEASE WITHOUT ESOPHAGITIS: ICD-10-CM

## 2018-10-09 PROCEDURE — 90674 CCIIV4 VAC NO PRSV 0.5 ML IM: CPT | Performed by: INTERNAL MEDICINE

## 2018-10-09 PROCEDURE — G0008 ADMIN INFLUENZA VIRUS VAC: HCPCS | Performed by: INTERNAL MEDICINE

## 2018-10-09 PROCEDURE — 99213 OFFICE O/P EST LOW 20 MIN: CPT | Performed by: INTERNAL MEDICINE

## 2018-10-09 NOTE — PROGRESS NOTES
subjective     Chief Complaint   Patient presents with   • Chest Pain   • Follow-up     test results     History of Present Illness  Patient is 73 years old white female who has history of bladder incontinence.  She is doing better with Ditropan which she is taking 5 mg twice a day.  She complains bitterly about dry mouth.  It was explained to her the dry mouth is probably due to Ditropan but she is not willing to stop Ditropan.  Will get ENT consultation.    Patient also has hyperlipidemia patient is taking fish oil and Zocor 20 mg daily.  There are no drug side effects.  She is taking medications regularly.    Off and on epigastric discomfort.  She is taking Protonix 40 mg daily with that she has had no complaints.    Patient has multiple environmental allergies and doing very well with the Singulair    Past Surgical History:   Procedure Laterality Date   • CARDIAC CATHETERIZATION  2014   • CARDIOVASCULAR STRESS TEST  2014   • ECHO - CONVERTED  2014   • ENDOSCOPY  2015   • HYSTERECTOMY  2002    benign   • LAPAROSCOPIC CHOLECYSTECTOMY  2001     Family History   Problem Relation Age of Onset   • Heart attack Mother    • Heart failure Mother    • Hypertension Mother    • Heart attack Father    • Hypertension Father    • Heart block Brother    • Heart block Sister    • Heart block Sister    • Heart attack Brother    • Heart block Brother    • Stroke Brother    • Breast cancer Neg Hx      Past Medical History:   Diagnosis Date   • Hyperlipidemia    • Osteoporosis    • Tongue irritation    • Weight loss      Patient Active Problem List   Diagnosis   • Hyperlipidemia   • Osteoporosis   • Ganglion cyst   • Gastroesophageal reflux disease without esophagitis   • Vitamin D deficiency   • Continuous leakage of urine   • Cough   • Environmental allergies   • URI, acute   • Chest pain in adult   • Xerostomia       Social History   Substance Use Topics   • Smoking status: Never Smoker   • Smokeless tobacco: Never Used   •  "Alcohol use No       Allergies   Allergen Reactions   • Latex        Current Outpatient Prescriptions on File Prior to Visit   Medication Sig   • aspirin 81 MG EC tablet Take 81 mg by mouth daily.   • cholecalciferol (VITAMIN D3) 1000 UNITS tablet Take 1,000 Units by mouth daily.   • montelukast (SINGULAIR) 10 MG tablet Take 1 tablet by mouth Every Night.   • Multiple Vitamin (MULTI VITAMIN DAILY PO) Take  by mouth.   • Omega-3 Fatty Acids (FISH OIL) 1000 MG capsule capsule Take 1,000 mg by mouth 2 (two) times a day with meals.   • oxybutynin (DITROPAN) 5 MG tablet Take 1 tablet by mouth 2 (Two) Times a Day.   • pantoprazole (PROTONIX) 40 MG EC tablet TAKE 1 TABLET BY MOUTH DAILY   • simvastatin (ZOCOR) 20 MG tablet Take 1 tablet by mouth Every Night.   • vitamin B-12 (CYANOCOBALAMIN) 1000 MCG tablet Take 1,000 mcg by mouth daily.     No current facility-administered medications on file prior to visit.          The following portions of the patient's history were reviewed and updated as appropriate: allergies, current medications, past family history, past medical history, past social history, past surgical history and problem list.    Review of Systems   Constitution: Negative.   HENT: Negative.  Negative for congestion.         Dry mouth   Eyes: Negative.    Cardiovascular: Negative.  Negative for chest pain, cyanosis, dyspnea on exertion, irregular heartbeat, leg swelling, near-syncope, orthopnea, palpitations, paroxysmal nocturnal dyspnea and syncope.   Respiratory: Negative.  Negative for shortness of breath.    Hematologic/Lymphatic: Negative.    Musculoskeletal: Negative.    Gastrointestinal: Negative.    Neurological: Negative.  Negative for headaches.          Objective:     /76 (BP Location: Right arm)   Pulse 78   Resp 16   Ht 167.6 cm (65.98\")   Wt 67.9 kg (149 lb 12.8 oz)   SpO2 99%   BMI 24.19 kg/m²   Physical Exam   Constitutional: She appears well-developed and well-nourished. No " distress.   HENT:   Head: Normocephalic and atraumatic.   Mouth/Throat: Oropharynx is clear and moist. No oropharyngeal exudate.   Eyes: Pupils are equal, round, and reactive to light. Conjunctivae and EOM are normal. No scleral icterus.   Neck: Normal range of motion. Neck supple. No JVD present. No tracheal deviation present. No thyromegaly present.   Cardiovascular: Normal rate, regular rhythm, normal heart sounds and intact distal pulses.  PMI is not displaced.  Exam reveals no gallop, no friction rub and no decreased pulses.    No murmur heard.  Pulses:       Carotid pulses are 3+ on the right side, and 3+ on the left side.       Radial pulses are 3+ on the right side, and 3+ on the left side.   Pulmonary/Chest: Effort normal and breath sounds normal. No respiratory distress. She has no wheezes. She has no rales. She exhibits no tenderness.   Abdominal: Soft. Bowel sounds are normal. She exhibits no distension, no abdominal bruit and no mass. There is no splenomegaly or hepatomegaly. There is no tenderness. There is no rebound and no guarding.   Musculoskeletal: Normal range of motion. She exhibits no edema, tenderness or deformity.   Lymphadenopathy:     She has no cervical adenopathy.   Neurological: She is alert. She has normal reflexes. No cranial nerve deficit. She exhibits normal muscle tone. Coordination normal.   Skin: Skin is warm and dry. No rash noted. She is not diaphoretic. No erythema.   Psychiatric: She has a normal mood and affect. Her behavior is normal. Judgment and thought content normal.         Lab Review  Lab Results   Component Value Date     07/06/2018    K 4.1 07/06/2018     07/06/2018    BUN 14 07/06/2018    CREATININE 0.81 07/06/2018    GLUCOSE 93 07/06/2018    CALCIUM 9.4 07/06/2018    ALT 26 07/06/2018    ALKPHOS 77 07/06/2018    LABIL2 1.7 05/09/2016     Lab Results   Component Value Date    CKTOTAL 113 07/06/2018     Lab Results   Component Value Date    WBC 2.99 (L)  07/06/2018    HGB 13.8 07/06/2018    HCT 41.6 07/06/2018     07/06/2018     Lab Results   Component Value Date    INR 0.95 05/19/2014     No results found for: MG  Lab Results   Component Value Date    TSH 2.389 10/27/2016     No results found for: BNP  Lab Results   Component Value Date    CHLPL 195 05/09/2016    CHOL 170 07/06/2018    TRIG 105 07/06/2018    HDL 50 (L) 07/06/2018    VLDL 21 07/06/2018    LDLHDL 1.98 07/06/2018     Lab Results   Component Value Date    LDL 99 07/06/2018    LDL 95 01/09/2018       Procedures       I personally viewed and interpreted the patient's LAB data         Assessment:     1. Xerostomia    2. Need for prophylactic vaccination against Streptococcus pneumoniae (pneumococcus) and influenza    3. Dry mouth, unspecified    4. Mixed hyperlipidemia    5. Gastroesophageal reflux disease without esophagitis    6. Continuous leakage of urine          Plan:      Cardiac workup was discussed with the patient stress test was negative for significant exercise-induced myocardial ischemia with normal ejection fraction of 70%    Echocardiogram showed LV diastolic dysfunction mild aortic regurgitation and mild-to-moderate tricuspid regurgitation and mild pulmonary hypertension.  LV ejection fraction was normal    Patient complains of dry mouth which is probably due to Ditropan however further workup is indicated will get ENT consultation patient is not willing to DC Ditropan.    No Follow-up on file.

## 2018-10-13 PROBLEM — K11.7 XEROSTOMIA: Status: ACTIVE | Noted: 2018-10-13

## 2018-11-12 RX ORDER — PANTOPRAZOLE SODIUM 40 MG/1
40 TABLET, DELAYED RELEASE ORAL DAILY
Qty: 90 TABLET | Refills: 0 | Status: SHIPPED | OUTPATIENT
Start: 2018-11-12 | End: 2019-01-08 | Stop reason: SDUPTHER

## 2019-01-08 ENCOUNTER — OFFICE VISIT (OUTPATIENT)
Dept: CARDIOLOGY | Facility: CLINIC | Age: 74
End: 2019-01-08

## 2019-01-08 VITALS
WEIGHT: 147 LBS | HEIGHT: 66 IN | OXYGEN SATURATION: 99 % | BODY MASS INDEX: 23.63 KG/M2 | SYSTOLIC BLOOD PRESSURE: 118 MMHG | HEART RATE: 92 BPM | DIASTOLIC BLOOD PRESSURE: 74 MMHG

## 2019-01-08 DIAGNOSIS — K11.7 XEROSTOMIA: ICD-10-CM

## 2019-01-08 DIAGNOSIS — K21.9 GASTROESOPHAGEAL REFLUX DISEASE WITHOUT ESOPHAGITIS: ICD-10-CM

## 2019-01-08 DIAGNOSIS — Z91.09 ENVIRONMENTAL ALLERGIES: ICD-10-CM

## 2019-01-08 DIAGNOSIS — E78.2 MIXED HYPERLIPIDEMIA: Primary | ICD-10-CM

## 2019-01-08 DIAGNOSIS — N39.45 CONTINUOUS LEAKAGE OF URINE: ICD-10-CM

## 2019-01-08 DIAGNOSIS — R53.82 CHRONIC FATIGUE: ICD-10-CM

## 2019-01-08 DIAGNOSIS — E55.9 VITAMIN D DEFICIENCY: ICD-10-CM

## 2019-01-08 PROCEDURE — 99213 OFFICE O/P EST LOW 20 MIN: CPT | Performed by: INTERNAL MEDICINE

## 2019-01-08 RX ORDER — FESOTERODINE FUMARATE 4 MG/1
4 TABLET, EXTENDED RELEASE ORAL
Qty: 30 TABLET | Refills: 2 | Status: SHIPPED | OUTPATIENT
Start: 2019-01-08 | End: 2019-03-18 | Stop reason: ALTCHOICE

## 2019-01-08 RX ORDER — PANTOPRAZOLE SODIUM 40 MG/1
40 TABLET, DELAYED RELEASE ORAL DAILY
Qty: 90 TABLET | Refills: 3 | Status: SHIPPED | OUTPATIENT
Start: 2019-01-08 | End: 2020-02-24

## 2019-01-08 NOTE — PROGRESS NOTES
subjective     Chief Complaint   Patient presents with   • Hyperlipidemia   • Follow-up     History of Present Illness    Patient is 73 years old white female who is here for follow-up of multiple chronic medical problems.  Patient complains of dry mouth.  She was sent to see Dr. Hoffman but he did not have any recommendations.  Patient is quite disappointed.  Her dry mouth is probably due to Ditropan.    Patient also has hyperlipidemia she is taking Zocor and fish oil.  No drug side effects.  Gastroesophageal reflux disorder on Protonix 20 mg daily.  Patient also has multiple environmental allergies she is taking Singulair.  She also has vitamin D and B12 deficiency on replacement therapy.  Overall no new complaints.  Past Surgical History:   Procedure Laterality Date   • CARDIAC CATHETERIZATION  2014   • CARDIOVASCULAR STRESS TEST  2014   • ECHO - CONVERTED  2014   • ENDOSCOPY  2015   • HYSTERECTOMY  2002    benign   • LAPAROSCOPIC CHOLECYSTECTOMY  2001     Family History   Problem Relation Age of Onset   • Heart attack Mother    • Heart failure Mother    • Hypertension Mother    • Heart attack Father    • Hypertension Father    • Heart block Brother    • Heart block Sister    • Heart block Sister    • Heart attack Brother    • Heart block Brother    • Stroke Brother    • Breast cancer Neg Hx      Past Medical History:   Diagnosis Date   • Hyperlipidemia    • Osteoporosis    • Tongue irritation    • Weight loss      Patient Active Problem List   Diagnosis   • Hyperlipidemia   • Osteoporosis   • Ganglion cyst   • Gastroesophageal reflux disease without esophagitis   • Vitamin D deficiency   • Continuous leakage of urine   • Cough   • Environmental allergies   • URI, acute   • Chest pain in adult   • Xerostomia       Social History     Tobacco Use   • Smoking status: Never Smoker   • Smokeless tobacco: Never Used   Substance Use Topics   • Alcohol use: No   • Drug use: No       Allergies   Allergen Reactions   •  "Latex        Current Outpatient Medications on File Prior to Visit   Medication Sig   • aspirin 81 MG EC tablet Take 81 mg by mouth daily.   • cholecalciferol (VITAMIN D3) 1000 UNITS tablet Take 1,000 Units by mouth daily.   • montelukast (SINGULAIR) 10 MG tablet Take 1 tablet by mouth Every Night.   • Multiple Vitamin (MULTI VITAMIN DAILY PO) Take  by mouth.   • Omega-3 Fatty Acids (FISH OIL) 1000 MG capsule capsule Take 1,000 mg by mouth 2 (two) times a day with meals.   • oxybutynin (DITROPAN) 5 MG tablet Take 1 tablet by mouth 2 (Two) Times a Day.   • simvastatin (ZOCOR) 20 MG tablet Take 1 tablet by mouth Every Night.   • vitamin B-12 (CYANOCOBALAMIN) 1000 MCG tablet Take 1,000 mcg by mouth daily.   • [DISCONTINUED] pantoprazole (PROTONIX) 40 MG EC tablet TAKE 1 TABLET BY MOUTH DAILY     No current facility-administered medications on file prior to visit.          The following portions of the patient's history were reviewed and updated as appropriate: allergies, current medications, past family history, past medical history, past social history, past surgical history and problem list.    Review of Systems   Constitution: Negative.   HENT: Negative.  Negative for congestion.         Dry mouth   Eyes: Negative.    Cardiovascular: Negative.  Negative for chest pain, cyanosis, dyspnea on exertion, irregular heartbeat, leg swelling, near-syncope, orthopnea, palpitations, paroxysmal nocturnal dyspnea and syncope.   Respiratory: Negative.  Negative for shortness of breath.    Hematologic/Lymphatic: Negative.    Musculoskeletal: Negative.    Gastrointestinal: Positive for heartburn.   Genitourinary: Positive for bladder incontinence.   Neurological: Negative.  Negative for headaches.          Objective:     /74 (BP Location: Left arm, Patient Position: Sitting)   Pulse 92   Ht 167.6 cm (65.98\")   Wt 66.7 kg (147 lb)   SpO2 99%   BMI 23.74 kg/m²   Physical Exam   Constitutional: She appears well-developed " and well-nourished. No distress.   HENT:   Head: Normocephalic and atraumatic.   Mouth/Throat: Oropharynx is clear and moist. No oropharyngeal exudate.   Eyes: Conjunctivae and EOM are normal. Pupils are equal, round, and reactive to light. No scleral icterus.   Neck: Normal range of motion. Neck supple. No JVD present. No tracheal deviation present. No thyromegaly present.   Cardiovascular: Normal rate, regular rhythm, normal heart sounds and intact distal pulses. PMI is not displaced. Exam reveals no gallop, no friction rub and no decreased pulses.   No murmur heard.  Pulses:       Carotid pulses are 3+ on the right side, and 3+ on the left side.       Radial pulses are 3+ on the right side, and 3+ on the left side.   Pulmonary/Chest: Effort normal and breath sounds normal. No respiratory distress. She has no wheezes. She has no rales. She exhibits no tenderness.   Abdominal: Soft. Bowel sounds are normal. She exhibits no distension, no abdominal bruit and no mass. There is no splenomegaly or hepatomegaly. There is no tenderness. There is no rebound and no guarding.   Musculoskeletal: Normal range of motion. She exhibits no edema, tenderness or deformity.   Lymphadenopathy:     She has no cervical adenopathy.   Neurological: She is alert. She has normal reflexes. No cranial nerve deficit. She exhibits normal muscle tone. Coordination normal.   Skin: Skin is warm and dry. No rash noted. She is not diaphoretic. No erythema.   Psychiatric: She has a normal mood and affect. Her behavior is normal. Judgment and thought content normal.         Lab Review  Lab Results   Component Value Date     07/06/2018    K 4.1 07/06/2018     07/06/2018    BUN 14 07/06/2018    CREATININE 0.81 07/06/2018    GLUCOSE 93 07/06/2018    CALCIUM 9.4 07/06/2018    ALT 26 07/06/2018    ALKPHOS 77 07/06/2018    LABIL2 1.7 05/09/2016     Lab Results   Component Value Date    CKTOTAL 113 07/06/2018     Lab Results   Component Value  Date    WBC 2.99 (L) 07/06/2018    HGB 13.8 07/06/2018    HCT 41.6 07/06/2018     07/06/2018     Lab Results   Component Value Date    INR 0.95 05/19/2014     No results found for: MG  Lab Results   Component Value Date    TSH 2.389 10/27/2016     No results found for: BNP  Lab Results   Component Value Date    CHLPL 195 05/09/2016    CHOL 170 07/06/2018    TRIG 105 07/06/2018    HDL 50 (L) 07/06/2018    VLDL 21 07/06/2018    LDLHDL 1.98 07/06/2018     Lab Results   Component Value Date    LDL 99 07/06/2018    LDL 95 01/09/2018       Procedures       I personally viewed and interpreted the patient's LAB data         Assessment:     1. Mixed hyperlipidemia    2. Gastroesophageal reflux disease without esophagitis    3. Environmental allergies    4. Continuous leakage of urine          Plan:      Hyperlipidemia has been fairly well controlled she will continue current medications.  Lab work scheduled for next visit.  Refills were given.  She will also continue Protonix.  She sees be feeling better.  Because of multiple environmental allergies she will continue Singulair.  Ditropan is causing lot of dry mouth.  Ditropan was discontinued.  Patient was started on Toviaz 4 mg daily  Follow-up scheduled        No Follow-up on file.

## 2019-03-18 ENCOUNTER — TELEPHONE (OUTPATIENT)
Dept: CARDIOLOGY | Facility: CLINIC | Age: 74
End: 2019-03-18

## 2019-03-18 RX ORDER — DARIFENACIN HYDROBROMIDE 7.5 MG/1
7.5 TABLET, EXTENDED RELEASE ORAL DAILY
Qty: 90 TABLET | Refills: 3 | Status: SHIPPED | OUTPATIENT
Start: 2019-03-18 | End: 2019-04-09 | Stop reason: ALTCHOICE

## 2019-03-18 NOTE — TELEPHONE ENCOUNTER
Pt called and said her Toviaz is going to cost over $300.00 per month. Can this be changed to something else? Please send to [facility] in [place]

## 2019-04-05 ENCOUNTER — LAB (OUTPATIENT)
Dept: LAB | Facility: HOSPITAL | Age: 74
End: 2019-04-05

## 2019-04-05 DIAGNOSIS — E55.9 VITAMIN D DEFICIENCY: ICD-10-CM

## 2019-04-05 DIAGNOSIS — R53.82 CHRONIC FATIGUE: ICD-10-CM

## 2019-04-05 DIAGNOSIS — E78.2 MIXED HYPERLIPIDEMIA: ICD-10-CM

## 2019-04-05 LAB
25(OH)D3 SERPL-MCNC: 34.8 NG/ML (ref 30–100)
ALBUMIN SERPL-MCNC: 3.7 G/DL (ref 3.5–5.2)
ALBUMIN/GLOB SERPL: 1.2 G/DL
ALP SERPL-CCNC: 60 U/L (ref 39–117)
ALT SERPL W P-5'-P-CCNC: 19 U/L (ref 1–33)
ANION GAP SERPL CALCULATED.3IONS-SCNC: 13.4 MMOL/L
AST SERPL-CCNC: 16 U/L (ref 1–32)
BASOPHILS # BLD AUTO: 0.04 10*3/MM3 (ref 0–0.2)
BASOPHILS NFR BLD AUTO: 1.1 % (ref 0–1.5)
BILIRUB SERPL-MCNC: 0.4 MG/DL (ref 0.2–1.2)
BUN BLD-MCNC: 15 MG/DL (ref 8–23)
BUN/CREAT SERPL: 16.5 (ref 7–25)
CALCIUM SPEC-SCNC: 9.9 MG/DL (ref 8.6–10.5)
CHLORIDE SERPL-SCNC: 104 MMOL/L (ref 98–107)
CHOLEST SERPL-MCNC: 147 MG/DL (ref 0–200)
CK SERPL-CCNC: 103 U/L (ref 20–180)
CO2 SERPL-SCNC: 26.6 MMOL/L (ref 22–29)
CREAT BLD-MCNC: 0.91 MG/DL (ref 0.57–1)
DEPRECATED RDW RBC AUTO: 45.8 FL (ref 37–54)
EOSINOPHIL # BLD AUTO: 0.15 10*3/MM3 (ref 0–0.4)
EOSINOPHIL NFR BLD AUTO: 4 % (ref 0.3–6.2)
ERYTHROCYTE [DISTWIDTH] IN BLOOD BY AUTOMATED COUNT: 12.8 % (ref 12.3–15.4)
GFR SERPL CREATININE-BSD FRML MDRD: 60 ML/MIN/1.73
GLOBULIN UR ELPH-MCNC: 3 GM/DL
GLUCOSE BLD-MCNC: 100 MG/DL (ref 65–99)
HCT VFR BLD AUTO: 43.8 % (ref 34–46.6)
HDLC SERPL-MCNC: 52 MG/DL (ref 40–60)
HGB BLD-MCNC: 13.1 G/DL (ref 12–15.9)
IMM GRANULOCYTES # BLD AUTO: 0 10*3/MM3 (ref 0–0.05)
IMM GRANULOCYTES NFR BLD AUTO: 0 % (ref 0–0.5)
LDLC SERPL CALC-MCNC: 74 MG/DL (ref 0–100)
LDLC/HDLC SERPL: 1.43 {RATIO}
LYMPHOCYTES # BLD AUTO: 1.38 10*3/MM3 (ref 0.7–3.1)
LYMPHOCYTES NFR BLD AUTO: 36.9 % (ref 19.6–45.3)
MCH RBC QN AUTO: 29 PG (ref 26.6–33)
MCHC RBC AUTO-ENTMCNC: 29.9 G/DL (ref 31.5–35.7)
MCV RBC AUTO: 97.1 FL (ref 79–97)
MONOCYTES # BLD AUTO: 0.38 10*3/MM3 (ref 0.1–0.9)
MONOCYTES NFR BLD AUTO: 10.2 % (ref 5–12)
NEUTROPHILS # BLD AUTO: 1.79 10*3/MM3 (ref 1.4–7)
NEUTROPHILS NFR BLD AUTO: 47.8 % (ref 42.7–76)
NRBC BLD AUTO-RTO: 0 /100 WBC (ref 0–0)
PLATELET # BLD AUTO: 227 10*3/MM3 (ref 140–450)
PMV BLD AUTO: 11.3 FL (ref 6–12)
POTASSIUM BLD-SCNC: 3.8 MMOL/L (ref 3.5–5.2)
PROT SERPL-MCNC: 6.7 G/DL (ref 6–8.5)
RBC # BLD AUTO: 4.51 10*6/MM3 (ref 3.77–5.28)
SODIUM BLD-SCNC: 144 MMOL/L (ref 136–145)
TRIGL SERPL-MCNC: 104 MG/DL (ref 0–150)
VIT B12 BLD-MCNC: 1177 PG/ML (ref 211–946)
VLDLC SERPL-MCNC: 20.8 MG/DL (ref 5–40)
WBC NRBC COR # BLD: 3.74 10*3/MM3 (ref 3.4–10.8)

## 2019-04-05 PROCEDURE — 80061 LIPID PANEL: CPT

## 2019-04-05 PROCEDURE — 80053 COMPREHEN METABOLIC PANEL: CPT

## 2019-04-05 PROCEDURE — 82607 VITAMIN B-12: CPT

## 2019-04-05 PROCEDURE — 82306 VITAMIN D 25 HYDROXY: CPT

## 2019-04-05 PROCEDURE — 82550 ASSAY OF CK (CPK): CPT

## 2019-04-05 PROCEDURE — 85025 COMPLETE CBC W/AUTO DIFF WBC: CPT

## 2019-04-09 ENCOUNTER — OFFICE VISIT (OUTPATIENT)
Dept: CARDIOLOGY | Facility: CLINIC | Age: 74
End: 2019-04-09

## 2019-04-09 VITALS
HEART RATE: 82 BPM | OXYGEN SATURATION: 97 % | SYSTOLIC BLOOD PRESSURE: 116 MMHG | HEIGHT: 65 IN | DIASTOLIC BLOOD PRESSURE: 64 MMHG | WEIGHT: 147 LBS | BODY MASS INDEX: 24.49 KG/M2

## 2019-04-09 DIAGNOSIS — N39.45 CONTINUOUS LEAKAGE OF URINE: ICD-10-CM

## 2019-04-09 DIAGNOSIS — E78.2 MIXED HYPERLIPIDEMIA: ICD-10-CM

## 2019-04-09 DIAGNOSIS — B37.2 CUTANEOUS CANDIDIASIS: Primary | ICD-10-CM

## 2019-04-09 DIAGNOSIS — Z91.09 ENVIRONMENTAL ALLERGIES: ICD-10-CM

## 2019-04-09 DIAGNOSIS — K21.9 GASTROESOPHAGEAL REFLUX DISEASE WITHOUT ESOPHAGITIS: ICD-10-CM

## 2019-04-09 PROCEDURE — 99214 OFFICE O/P EST MOD 30 MIN: CPT | Performed by: INTERNAL MEDICINE

## 2019-04-09 RX ORDER — OXYBUTYNIN CHLORIDE 5 MG/1
5 TABLET ORAL 2 TIMES DAILY
Refills: 1 | COMMUNITY
Start: 2019-03-25 | End: 2019-07-22 | Stop reason: SDUPTHER

## 2019-04-09 RX ORDER — NYSTATIN 100000 U/G
CREAM TOPICAL 2 TIMES DAILY
Qty: 30 G | Refills: 0 | Status: SHIPPED | OUTPATIENT
Start: 2019-04-09 | End: 2019-09-10

## 2019-04-09 NOTE — PROGRESS NOTES
subjective     Chief Complaint   Patient presents with   • Hyperlipidemia   • Follow-up     History of Present Illness    Patient is 73 years old female who states that she has some yeast and itching in the groin area.  She wants some nystatin cream which has helped her in the past.  It is localized to the groin area.    Patient has hyperlipidemia.  She is taking Zocor and fish oil.  She is tolerating medication well without any side effect.    Patient has stress incontinence.  She is taking Ditropan which is helping somewhat but she could not afford Enablex or other medications.  Grant was probative.    GI symptoms are better with the Protonix.  She had lab work done which will be discussed today.  Patient also has environmental allergies and takes Singulair which seems to be helping.  Past Surgical History:   Procedure Laterality Date   • CARDIAC CATHETERIZATION  2014   • CARDIOVASCULAR STRESS TEST  2014   • ECHO - CONVERTED  2014   • ENDOSCOPY  2015   • HYSTERECTOMY  2002    benign   • LAPAROSCOPIC CHOLECYSTECTOMY  2001     Family History   Problem Relation Age of Onset   • Heart attack Mother    • Heart failure Mother    • Hypertension Mother    • Heart attack Father    • Hypertension Father    • Heart block Brother    • Heart block Sister    • Heart block Sister    • Heart attack Brother    • Heart block Brother    • Stroke Brother    • Breast cancer Neg Hx      Past Medical History:   Diagnosis Date   • Hyperlipidemia    • Osteoporosis    • Tongue irritation    • Weight loss      Patient Active Problem List   Diagnosis   • Hyperlipidemia   • Osteoporosis   • Ganglion cyst   • Gastroesophageal reflux disease without esophagitis   • Vitamin D deficiency   • Continuous leakage of urine   • Cough   • Environmental allergies   • URI, acute   • Chest pain in adult   • Xerostomia   • Chronic fatigue   • Cutaneous candidiasis       Social History     Tobacco Use   • Smoking status: Never Smoker   • Smokeless tobacco:  Never Used   Substance Use Topics   • Alcohol use: No   • Drug use: No       Allergies   Allergen Reactions   • Latex        Current Outpatient Medications on File Prior to Visit   Medication Sig   • aspirin 81 MG EC tablet Take 81 mg by mouth daily.   • cholecalciferol (VITAMIN D3) 1000 UNITS tablet Take 1,000 Units by mouth daily.   • montelukast (SINGULAIR) 10 MG tablet Take 1 tablet by mouth Every Night.   • Multiple Vitamin (MULTI VITAMIN DAILY PO) Take  by mouth.   • Omega-3 Fatty Acids (FISH OIL) 1000 MG capsule capsule Take 1,000 mg by mouth 2 (two) times a day with meals.   • oxybutynin (DITROPAN) 5 MG tablet Take 5 mg by mouth 2 (Two) Times a Day.   • pantoprazole (PROTONIX) 40 MG EC tablet Take 1 tablet by mouth Daily.   • simvastatin (ZOCOR) 20 MG tablet Take 1 tablet by mouth Every Night.   • vitamin B-12 (CYANOCOBALAMIN) 1000 MCG tablet Take 1,000 mcg by mouth daily.   • [DISCONTINUED] darifenacin (ENABLEX) 7.5 MG 24 hr tablet Take 1 tablet by mouth Daily.     No current facility-administered medications on file prior to visit.          The following portions of the patient's history were reviewed and updated as appropriate: allergies, current medications, past family history, past medical history, past social history, past surgical history and problem list.    Review of Systems   Constitution: Negative.   HENT: Negative.  Negative for congestion.    Eyes: Negative.    Cardiovascular: Negative.  Negative for chest pain, cyanosis, dyspnea on exertion, irregular heartbeat, leg swelling, near-syncope, orthopnea, palpitations, paroxysmal nocturnal dyspnea and syncope.   Respiratory: Negative.  Negative for shortness of breath.    Hematologic/Lymphatic: Negative.    Skin: Positive for rash.   Musculoskeletal: Negative.    Gastrointestinal: Negative.    Genitourinary: Positive for urgency.   Neurological: Negative.  Negative for headaches.          Objective:     /64 (BP Location: Left arm, Patient  "Position: Sitting)   Pulse 82   Ht 165.1 cm (65\")   Wt 66.7 kg (147 lb)   SpO2 97%   BMI 24.46 kg/m²   Physical Exam   Constitutional: She appears well-developed and well-nourished. No distress.   HENT:   Head: Normocephalic and atraumatic.   Mouth/Throat: Oropharynx is clear and moist. No oropharyngeal exudate.   Eyes: Conjunctivae and EOM are normal. Pupils are equal, round, and reactive to light. No scleral icterus.   Neck: Normal range of motion. Neck supple. No JVD present. No tracheal deviation present. No thyromegaly present.   Cardiovascular: Normal rate, regular rhythm, normal heart sounds and intact distal pulses. PMI is not displaced. Exam reveals no gallop, no friction rub and no decreased pulses.   No murmur heard.  Pulses:       Carotid pulses are 3+ on the right side, and 3+ on the left side.       Radial pulses are 3+ on the right side, and 3+ on the left side.   Pulmonary/Chest: Effort normal and breath sounds normal. No respiratory distress. She has no wheezes. She has no rales. She exhibits no tenderness.   Abdominal: Soft. Bowel sounds are normal. She exhibits no distension, no abdominal bruit and no mass. There is no splenomegaly or hepatomegaly. There is no tenderness. There is no rebound and no guarding.   Musculoskeletal: Normal range of motion. She exhibits no edema, tenderness or deformity.   Lymphadenopathy:     She has no cervical adenopathy.   Neurological: She is alert. She has normal reflexes. No cranial nerve deficit. She exhibits normal muscle tone. Coordination normal.   Skin: Skin is warm and dry. No rash noted. She is not diaphoretic. No erythema.   Psychiatric: She has a normal mood and affect. Her behavior is normal. Judgment and thought content normal.         Lab Review  Lab Results   Component Value Date     04/05/2019    K 3.8 04/05/2019     04/05/2019    BUN 15 04/05/2019    CREATININE 0.91 04/05/2019    GLUCOSE 100 (H) 04/05/2019    CALCIUM 9.9 04/05/2019 "    ALT 19 04/05/2019    ALKPHOS 60 04/05/2019    LABIL2 1.7 05/09/2016     Lab Results   Component Value Date    CKTOTAL 103 04/05/2019     Lab Results   Component Value Date    WBC 3.74 04/05/2019    HGB 13.1 04/05/2019    HCT 43.8 04/05/2019     04/05/2019     Lab Results   Component Value Date    INR 0.95 05/19/2014     No results found for: MG  Lab Results   Component Value Date    TSH 2.389 10/27/2016     No results found for: BNP  Lab Results   Component Value Date    CHLPL 195 05/09/2016    CHOL 147 04/05/2019    TRIG 104 04/05/2019    HDL 52 04/05/2019    VLDL 20.8 04/05/2019    LDLHDL 1.43 04/05/2019     Lab Results   Component Value Date    LDL 74 04/05/2019    LDL 99 07/06/2018       Procedures       I personally viewed and interpreted the patient's LAB data         Assessment:     1. Cutaneous candidiasis    2. Mixed hyperlipidemia    3. Continuous leakage of urine    4. Environmental allergies    5. Gastroesophageal reflux disease without esophagitis          Plan:     Lab work reviewed and discussed with the patient  CBC CMP and lipid panel is normal.  Lipid control is good.  Patient was advised to continue fish oil and Zocor.    GI symptoms are very well controlled Protonix was continued.  Singulair is helping environmental allergies that was continued.    Patient complains of skin rash and yeast infection in the groin area.  Nystatin cream was prescribed.  Follow-up scheduled        No Follow-up on file.

## 2019-06-24 RX ORDER — MONTELUKAST SODIUM 10 MG/1
10 TABLET ORAL NIGHTLY
Qty: 90 TABLET | Refills: 0 | Status: SHIPPED | OUTPATIENT
Start: 2019-06-24 | End: 2019-10-01 | Stop reason: SDUPTHER

## 2019-07-09 ENCOUNTER — OFFICE VISIT (OUTPATIENT)
Dept: CARDIOLOGY | Facility: CLINIC | Age: 74
End: 2019-07-09

## 2019-07-09 VITALS
DIASTOLIC BLOOD PRESSURE: 72 MMHG | HEIGHT: 65 IN | WEIGHT: 144 LBS | SYSTOLIC BLOOD PRESSURE: 116 MMHG | HEART RATE: 91 BPM | BODY MASS INDEX: 23.99 KG/M2 | OXYGEN SATURATION: 98 %

## 2019-07-09 DIAGNOSIS — E55.9 VITAMIN D DEFICIENCY: ICD-10-CM

## 2019-07-09 DIAGNOSIS — R53.82 CHRONIC FATIGUE: ICD-10-CM

## 2019-07-09 DIAGNOSIS — Z12.31 ENCOUNTER FOR SCREENING MAMMOGRAM FOR MALIGNANT NEOPLASM OF BREAST: ICD-10-CM

## 2019-07-09 DIAGNOSIS — N39.45 CONTINUOUS LEAKAGE OF URINE: ICD-10-CM

## 2019-07-09 DIAGNOSIS — B37.31 VAGINAL CANDIDIASIS: Primary | ICD-10-CM

## 2019-07-09 DIAGNOSIS — E78.2 MIXED HYPERLIPIDEMIA: ICD-10-CM

## 2019-07-09 PROCEDURE — 99214 OFFICE O/P EST MOD 30 MIN: CPT | Performed by: INTERNAL MEDICINE

## 2019-07-09 RX ORDER — FLUCONAZOLE 200 MG/1
200 TABLET ORAL DAILY
Qty: 4 TABLET | Refills: 0 | Status: SHIPPED | OUTPATIENT
Start: 2019-07-09 | End: 2019-09-10

## 2019-07-09 NOTE — PROGRESS NOTES
subjective     Chief Complaint   Patient presents with   • Vaginitis     History of Present Illness  Patient is 74 years old white female who has multiple chronic medical problems.  She is here stating that she has vaginitis.  She describes it as yeast infection with whitish discharge and itching.    Patient also has hyperlipidemia.  She is taking Zocor 20 mg daily and fish oil.  Patient has urinary incontinence and wears pads along with the Ditropan.    Patient has chronic fatigue and is taking vitamin B12.  She has osteoporosis and is taking vitamin D    Past Surgical History:   Procedure Laterality Date   • CARDIAC CATHETERIZATION  2014   • CARDIOVASCULAR STRESS TEST  2014   • ECHO - CONVERTED  2014   • ENDOSCOPY  2015   • HYSTERECTOMY  2002    benign   • LAPAROSCOPIC CHOLECYSTECTOMY  2001     Family History   Problem Relation Age of Onset   • Heart attack Mother    • Heart failure Mother    • Hypertension Mother    • Heart attack Father    • Hypertension Father    • Heart block Brother    • Heart block Sister    • Heart block Sister    • Heart attack Brother    • Heart block Brother    • Stroke Brother    • Breast cancer Neg Hx      Past Medical History:   Diagnosis Date   • Hyperlipidemia    • Osteoporosis    • Tongue irritation    • Weight loss      Patient Active Problem List   Diagnosis   • Hyperlipidemia   • Osteoporosis   • Ganglion cyst   • Gastroesophageal reflux disease without esophagitis   • Vitamin D deficiency   • Continuous leakage of urine   • Cough   • Environmental allergies   • URI, acute   • Chest pain in adult   • Xerostomia   • Chronic fatigue   • Cutaneous candidiasis       Social History     Tobacco Use   • Smoking status: Never Smoker   • Smokeless tobacco: Never Used   Substance Use Topics   • Alcohol use: No   • Drug use: No       Allergies   Allergen Reactions   • Latex        Current Outpatient Medications on File Prior to Visit   Medication Sig   • aspirin 81 MG EC tablet Take 81 mg  "by mouth daily.   • cholecalciferol (VITAMIN D3) 1000 UNITS tablet Take 1,000 Units by mouth daily.   • montelukast (SINGULAIR) 10 MG tablet TAKE 1 TABLET BY MOUTH EVERY NIGHT   • Multiple Vitamin (MULTI VITAMIN DAILY PO) Take  by mouth.   • nystatin (MYCOSTATIN) 123879 UNIT/GM cream Apply  topically to the appropriate area as directed 2 (Two) Times a Day.   • Omega-3 Fatty Acids (FISH OIL) 1000 MG capsule capsule Take 1,000 mg by mouth 2 (two) times a day with meals.   • oxybutynin (DITROPAN) 5 MG tablet Take 5 mg by mouth 2 (Two) Times a Day.   • pantoprazole (PROTONIX) 40 MG EC tablet Take 1 tablet by mouth Daily.   • simvastatin (ZOCOR) 20 MG tablet Take 1 tablet by mouth Every Night.   • vitamin B-12 (CYANOCOBALAMIN) 1000 MCG tablet Take 1,000 mcg by mouth daily.     No current facility-administered medications on file prior to visit.          The following portions of the patient's history were reviewed and updated as appropriate: allergies, current medications, past family history, past medical history, past social history, past surgical history and problem list.    Review of Systems   Constitution: Negative.   HENT: Negative.  Negative for congestion.    Eyes: Negative.    Cardiovascular: Negative.  Negative for chest pain, cyanosis, dyspnea on exertion, irregular heartbeat, leg swelling, near-syncope, orthopnea, palpitations, paroxysmal nocturnal dyspnea and syncope.   Respiratory: Negative.  Negative for shortness of breath.    Hematologic/Lymphatic: Negative.    Musculoskeletal: Negative.    Gastrointestinal: Negative.    Genitourinary: Positive for bladder incontinence and urgency.        Vagina yeast infection   Neurological: Negative.  Negative for headaches.          Objective:     /72 (BP Location: Left arm, Patient Position: Sitting)   Pulse 91   Ht 165.1 cm (65\")   Wt 65.3 kg (144 lb)   SpO2 98%   BMI 23.96 kg/m²   Physical Exam   Constitutional: She appears well-developed and " well-nourished. No distress.   HENT:   Head: Normocephalic and atraumatic.   Mouth/Throat: Oropharynx is clear and moist. No oropharyngeal exudate.   Eyes: Conjunctivae and EOM are normal. Pupils are equal, round, and reactive to light. No scleral icterus.   Neck: Normal range of motion. Neck supple. No JVD present. No tracheal deviation present. No thyromegaly present.   Cardiovascular: Normal rate, regular rhythm, normal heart sounds and intact distal pulses. PMI is not displaced. Exam reveals no gallop, no friction rub and no decreased pulses.   No murmur heard.  Pulses:       Carotid pulses are 3+ on the right side, and 3+ on the left side.       Radial pulses are 3+ on the right side, and 3+ on the left side.   Pulmonary/Chest: Effort normal and breath sounds normal. No respiratory distress. She has no wheezes. She has no rales. She exhibits no tenderness.   Abdominal: Soft. Bowel sounds are normal. She exhibits no distension, no abdominal bruit and no mass. There is no splenomegaly or hepatomegaly. There is no tenderness. There is no rebound and no guarding.   Musculoskeletal: Normal range of motion. She exhibits no edema, tenderness or deformity.   Lymphadenopathy:     She has no cervical adenopathy.   Neurological: She is alert. She has normal reflexes. No cranial nerve deficit. She exhibits normal muscle tone. Coordination normal.   Skin: Skin is warm and dry. No rash noted. She is not diaphoretic. No erythema.   Psychiatric: She has a normal mood and affect. Her behavior is normal. Judgment and thought content normal.         Lab Review  Lab Results   Component Value Date     04/05/2019    K 3.8 04/05/2019     04/05/2019    BUN 15 04/05/2019    CREATININE 0.91 04/05/2019    GLUCOSE 100 (H) 04/05/2019    CALCIUM 9.9 04/05/2019    ALT 19 04/05/2019    ALKPHOS 60 04/05/2019    LABIL2 1.7 05/09/2016     Lab Results   Component Value Date    CKTOTAL 103 04/05/2019     Lab Results   Component Value  Date    WBC 3.74 04/05/2019    HGB 13.1 04/05/2019    HCT 43.8 04/05/2019     04/05/2019     Lab Results   Component Value Date    INR 0.95 05/19/2014     No results found for: MG  Lab Results   Component Value Date    TSH 2.389 10/27/2016     No results found for: BNP  Lab Results   Component Value Date    CHLPL 195 05/09/2016    CHOL 147 04/05/2019    TRIG 104 04/05/2019    HDL 52 04/05/2019    VLDL 20.8 04/05/2019    LDLHDL 1.43 04/05/2019     Lab Results   Component Value Date    LDL 74 04/05/2019    LDL 99 07/06/2018       Procedures       I personally viewed and interpreted the patient's LAB data         Assessment:     1. Vaginal candidiasis    2. Encounter for screening mammogram for malignant neoplasm of breast    3. Mixed hyperlipidemia    4. Chronic fatigue    5. Continuous leakage of urine    6. Vitamin D deficiency          Plan:     Patient has vaginal candidiasis.  She was given Diflucan and also was started on Monistat.  She has urinary incontinence and is using pads moisture might be contributing to her problems.  Will check lab work also to exclude diabetes.    Blood pressure is very well controlled  Patient was advised to continue fish oil and Zocor.  Lab work scheduled.  Ditropan was continued for urinary incontinence.  Follow-up scheduled        No Follow-up on file.

## 2019-07-16 RX ORDER — OXYBUTYNIN CHLORIDE 5 MG/1
TABLET ORAL
Qty: 180 TABLET | Refills: 0 | OUTPATIENT
Start: 2019-07-16

## 2019-07-22 ENCOUNTER — TELEPHONE (OUTPATIENT)
Dept: CARDIOLOGY | Facility: CLINIC | Age: 74
End: 2019-07-22

## 2019-07-22 RX ORDER — OXYBUTYNIN CHLORIDE 5 MG/1
5 TABLET ORAL 2 TIMES DAILY
Qty: 180 TABLET | Refills: 0 | Status: SHIPPED | OUTPATIENT
Start: 2019-07-22 | End: 2019-09-10

## 2019-07-22 NOTE — TELEPHONE ENCOUNTER
Patient request refill on ditropan 5mg BID she says the pharmacy requested it and it was denied, she also states Dr Rodrigues was going to change it but she could not afford the other med so he told her to stay on the ditropan.

## 2019-07-30 ENCOUNTER — HOSPITAL ENCOUNTER (OUTPATIENT)
Dept: MAMMOGRAPHY | Facility: HOSPITAL | Age: 74
Discharge: HOME | End: 2019-07-30
Admitting: INTERNAL MEDICINE

## 2019-07-30 PROCEDURE — 77063 BREAST TOMOSYNTHESIS BI: CPT | Performed by: RADIOLOGY

## 2019-07-30 PROCEDURE — 77067 SCR MAMMO BI INCL CAD: CPT

## 2019-07-30 PROCEDURE — 77067 SCR MAMMO BI INCL CAD: CPT | Performed by: RADIOLOGY

## 2019-09-06 ENCOUNTER — APPOINTMENT (OUTPATIENT)
Dept: CT IMAGING | Facility: HOSPITAL | Age: 74
End: 2019-09-06

## 2019-09-06 ENCOUNTER — HOSPITAL ENCOUNTER (EMERGENCY)
Facility: HOSPITAL | Age: 74
Discharge: HOME | End: 2019-09-07
Attending: EMERGENCY MEDICINE | Admitting: EMERGENCY MEDICINE

## 2019-09-06 DIAGNOSIS — N20.1 LEFT URETERAL STONE: Primary | ICD-10-CM

## 2019-09-06 LAB
ALBUMIN SERPL-MCNC: 4.48 G/DL (ref 3.5–5.2)
ALBUMIN/GLOB SERPL: 1.7 G/DL
ALP SERPL-CCNC: 84 U/L (ref 39–117)
ALT SERPL W P-5'-P-CCNC: 26 U/L (ref 1–33)
AMYLASE SERPL-CCNC: 52 U/L (ref 28–100)
ANION GAP SERPL CALCULATED.3IONS-SCNC: 13 MMOL/L (ref 5–15)
AST SERPL-CCNC: 28 U/L (ref 1–32)
BACTERIA UR QL AUTO: ABNORMAL /HPF
BASOPHILS # BLD AUTO: 0.01 10*3/MM3 (ref 0–0.2)
BASOPHILS NFR BLD AUTO: 0.1 % (ref 0–1.5)
BILIRUB SERPL-MCNC: 1.1 MG/DL (ref 0.2–1.2)
BILIRUB UR QL STRIP: NEGATIVE
BUN BLD-MCNC: 17 MG/DL (ref 8–23)
BUN/CREAT SERPL: 13.9 (ref 7–25)
CALCIUM SPEC-SCNC: 10 MG/DL (ref 8.6–10.5)
CHLORIDE SERPL-SCNC: 101 MMOL/L (ref 98–107)
CLARITY UR: CLEAR
CO2 SERPL-SCNC: 26 MMOL/L (ref 22–29)
COLOR UR: YELLOW
CREAT BLD-MCNC: 1.22 MG/DL (ref 0.57–1)
D-LACTATE SERPL-SCNC: 1.5 MMOL/L (ref 0.5–2)
DEPRECATED RDW RBC AUTO: 43.3 FL (ref 37–54)
EOSINOPHIL # BLD AUTO: 0 10*3/MM3 (ref 0–0.4)
EOSINOPHIL NFR BLD AUTO: 0 % (ref 0.3–6.2)
ERYTHROCYTE [DISTWIDTH] IN BLOOD BY AUTOMATED COUNT: 13.1 % (ref 12.3–15.4)
GFR SERPL CREATININE-BSD FRML MDRD: 43 ML/MIN/1.73
GLOBULIN UR ELPH-MCNC: 2.6 GM/DL
GLUCOSE BLD-MCNC: 145 MG/DL (ref 65–99)
GLUCOSE UR STRIP-MCNC: NEGATIVE MG/DL
HCT VFR BLD AUTO: 44.4 % (ref 34–46.6)
HGB BLD-MCNC: 14.6 G/DL (ref 12–15.9)
HGB UR QL STRIP.AUTO: ABNORMAL
HOLD SPECIMEN: NORMAL
HOLD SPECIMEN: NORMAL
HYALINE CASTS UR QL AUTO: ABNORMAL /LPF
IMM GRANULOCYTES # BLD AUTO: 0.03 10*3/MM3 (ref 0–0.05)
IMM GRANULOCYTES NFR BLD AUTO: 0.3 % (ref 0–0.5)
KETONES UR QL STRIP: ABNORMAL
LEUKOCYTE ESTERASE UR QL STRIP.AUTO: ABNORMAL
LIPASE SERPL-CCNC: 33 U/L (ref 13–60)
LYMPHOCYTES # BLD AUTO: 0.43 10*3/MM3 (ref 0.7–3.1)
LYMPHOCYTES NFR BLD AUTO: 4 % (ref 19.6–45.3)
MCH RBC QN AUTO: 30.7 PG (ref 26.6–33)
MCHC RBC AUTO-ENTMCNC: 32.9 G/DL (ref 31.5–35.7)
MCV RBC AUTO: 93.5 FL (ref 79–97)
MONOCYTES # BLD AUTO: 0.45 10*3/MM3 (ref 0.1–0.9)
MONOCYTES NFR BLD AUTO: 4.2 % (ref 5–12)
NEUTROPHILS # BLD AUTO: 9.87 10*3/MM3 (ref 1.7–7)
NEUTROPHILS NFR BLD AUTO: 91.4 % (ref 42.7–76)
NITRITE UR QL STRIP: NEGATIVE
PH UR STRIP.AUTO: 7.5 [PH] (ref 5–8)
PLATELET # BLD AUTO: 205 10*3/MM3 (ref 140–450)
PMV BLD AUTO: 10.6 FL (ref 6–12)
POTASSIUM BLD-SCNC: 4.7 MMOL/L (ref 3.5–5.2)
PROT SERPL-MCNC: 7.1 G/DL (ref 6–8.5)
PROT UR QL STRIP: ABNORMAL
RBC # BLD AUTO: 4.75 10*6/MM3 (ref 3.77–5.28)
RBC # UR: ABNORMAL /HPF
REF LAB TEST METHOD: ABNORMAL
SODIUM BLD-SCNC: 140 MMOL/L (ref 136–145)
SP GR UR STRIP: 1.02 (ref 1–1.03)
SQUAMOUS #/AREA URNS HPF: ABNORMAL /HPF
UROBILINOGEN UR QL STRIP: ABNORMAL
WBC NRBC COR # BLD: 10.79 10*3/MM3 (ref 3.4–10.8)
WBC UR QL AUTO: ABNORMAL /HPF
WHOLE BLOOD HOLD SPECIMEN: NORMAL
WHOLE BLOOD HOLD SPECIMEN: NORMAL

## 2019-09-06 PROCEDURE — 83690 ASSAY OF LIPASE: CPT | Performed by: EMERGENCY MEDICINE

## 2019-09-06 PROCEDURE — 25010000002 MORPHINE PER 10 MG: Performed by: NURSE PRACTITIONER

## 2019-09-06 PROCEDURE — 74176 CT ABD & PELVIS W/O CONTRAST: CPT

## 2019-09-06 PROCEDURE — 25010000002 ONDANSETRON PER 1 MG: Performed by: NURSE PRACTITIONER

## 2019-09-06 PROCEDURE — 80053 COMPREHEN METABOLIC PANEL: CPT | Performed by: EMERGENCY MEDICINE

## 2019-09-06 PROCEDURE — 85025 COMPLETE CBC W/AUTO DIFF WBC: CPT | Performed by: EMERGENCY MEDICINE

## 2019-09-06 PROCEDURE — 25010000002 KETOROLAC TROMETHAMINE PER 15 MG: Performed by: NURSE PRACTITIONER

## 2019-09-06 PROCEDURE — 99284 EMERGENCY DEPT VISIT MOD MDM: CPT

## 2019-09-06 PROCEDURE — 74176 CT ABD & PELVIS W/O CONTRAST: CPT | Performed by: RADIOLOGY

## 2019-09-06 PROCEDURE — 99283 EMERGENCY DEPT VISIT LOW MDM: CPT

## 2019-09-06 PROCEDURE — 96374 THER/PROPH/DIAG INJ IV PUSH: CPT

## 2019-09-06 PROCEDURE — 81001 URINALYSIS AUTO W/SCOPE: CPT | Performed by: EMERGENCY MEDICINE

## 2019-09-06 PROCEDURE — 96375 TX/PRO/DX INJ NEW DRUG ADDON: CPT

## 2019-09-06 PROCEDURE — 83605 ASSAY OF LACTIC ACID: CPT | Performed by: EMERGENCY MEDICINE

## 2019-09-06 PROCEDURE — 82150 ASSAY OF AMYLASE: CPT | Performed by: EMERGENCY MEDICINE

## 2019-09-06 RX ORDER — ONDANSETRON 4 MG/1
4 TABLET, ORALLY DISINTEGRATING ORAL EVERY 6 HOURS PRN
Qty: 15 TABLET | Refills: 0 | Status: SHIPPED | OUTPATIENT
Start: 2019-09-06 | End: 2019-10-01

## 2019-09-06 RX ORDER — ONDANSETRON 2 MG/ML
4 INJECTION INTRAMUSCULAR; INTRAVENOUS ONCE
Status: COMPLETED | OUTPATIENT
Start: 2019-09-06 | End: 2019-09-06

## 2019-09-06 RX ORDER — SULFAMETHOXAZOLE AND TRIMETHOPRIM 800; 160 MG/1; MG/1
1 TABLET ORAL 2 TIMES DAILY
Qty: 20 TABLET | Refills: 0 | Status: SHIPPED | OUTPATIENT
Start: 2019-09-06 | End: 2019-09-16

## 2019-09-06 RX ORDER — MORPHINE SULFATE 2 MG/ML
2 INJECTION, SOLUTION INTRAMUSCULAR; INTRAVENOUS ONCE
Status: COMPLETED | OUTPATIENT
Start: 2019-09-06 | End: 2019-09-06

## 2019-09-06 RX ORDER — SODIUM CHLORIDE 0.9 % (FLUSH) 0.9 %
10 SYRINGE (ML) INJECTION AS NEEDED
Status: DISCONTINUED | OUTPATIENT
Start: 2019-09-06 | End: 2019-09-07 | Stop reason: HOSPADM

## 2019-09-06 RX ORDER — KETOROLAC TROMETHAMINE 30 MG/ML
15 INJECTION, SOLUTION INTRAMUSCULAR; INTRAVENOUS ONCE
Status: COMPLETED | OUTPATIENT
Start: 2019-09-06 | End: 2019-09-06

## 2019-09-06 RX ORDER — OXYCODONE HYDROCHLORIDE AND ACETAMINOPHEN 5; 325 MG/1; MG/1
1 TABLET ORAL EVERY 4 HOURS PRN
Qty: 12 TABLET | Refills: 0 | Status: SHIPPED | OUTPATIENT
Start: 2019-09-06 | End: 2019-09-12

## 2019-09-06 RX ORDER — ONDANSETRON 4 MG/1
4 TABLET, ORALLY DISINTEGRATING ORAL ONCE
Status: DISCONTINUED | OUTPATIENT
Start: 2019-09-06 | End: 2019-09-07 | Stop reason: HOSPADM

## 2019-09-06 RX ORDER — TAMSULOSIN HYDROCHLORIDE 0.4 MG/1
0.4 CAPSULE ORAL ONCE
Status: COMPLETED | OUTPATIENT
Start: 2019-09-06 | End: 2019-09-07

## 2019-09-06 RX ORDER — TAMSULOSIN HYDROCHLORIDE 0.4 MG/1
1 CAPSULE ORAL DAILY
Qty: 30 CAPSULE | Refills: 0 | Status: SHIPPED | OUTPATIENT
Start: 2019-09-06 | End: 2019-10-01

## 2019-09-06 RX ORDER — OXYCODONE HYDROCHLORIDE AND ACETAMINOPHEN 5; 325 MG/1; MG/1
1 TABLET ORAL EVERY 4 HOURS PRN
Status: DISCONTINUED | OUTPATIENT
Start: 2019-09-06 | End: 2019-09-07 | Stop reason: HOSPADM

## 2019-09-06 RX ADMIN — MORPHINE SULFATE 2 MG: 2 INJECTION, SOLUTION INTRAMUSCULAR; INTRAVENOUS at 23:10

## 2019-09-06 RX ADMIN — ONDANSETRON 4 MG: 2 INJECTION, SOLUTION INTRAMUSCULAR; INTRAVENOUS at 22:02

## 2019-09-06 RX ADMIN — KETOROLAC TROMETHAMINE 15 MG: 30 INJECTION, SOLUTION INTRAMUSCULAR; INTRAVENOUS at 22:04

## 2019-09-06 RX ADMIN — SODIUM CHLORIDE 1000 ML: 9 INJECTION, SOLUTION INTRAVENOUS at 22:08

## 2019-09-07 VITALS
OXYGEN SATURATION: 100 % | TEMPERATURE: 98.1 F | HEIGHT: 66 IN | DIASTOLIC BLOOD PRESSURE: 61 MMHG | RESPIRATION RATE: 18 BRPM | HEART RATE: 102 BPM | BODY MASS INDEX: 22.18 KG/M2 | SYSTOLIC BLOOD PRESSURE: 102 MMHG | WEIGHT: 138 LBS

## 2019-09-07 RX ADMIN — TAMSULOSIN HYDROCHLORIDE 0.4 MG: 0.4 CAPSULE ORAL at 00:24

## 2019-09-07 NOTE — ED PROVIDER NOTES
Subjective     Flank Pain   Pain location:  L flank  Pain quality: sharp and shooting    Pain radiates to:  Does not radiate  Pain severity:  Moderate  Onset quality:  Sudden  Timing:  Constant  Progression:  Worsening  Chronicity:  New  Relieved by:  None tried  Worsened by:  Nothing  Ineffective treatments:  None tried  Associated symptoms: nausea and vomiting    Associated symptoms: no chest pain, no dysuria and no fever        Review of Systems   Constitutional: Negative.  Negative for fever.   HENT: Negative.    Respiratory: Negative.    Cardiovascular: Negative.  Negative for chest pain.   Gastrointestinal: Positive for nausea and vomiting. Negative for abdominal pain.   Endocrine: Negative.    Genitourinary: Positive for flank pain. Negative for dysuria.   Skin: Negative.    Neurological: Negative.    Psychiatric/Behavioral: Negative.    All other systems reviewed and are negative.      Past Medical History:   Diagnosis Date   • Hyperlipidemia    • Osteoporosis    • Tongue irritation    • Weight loss        Allergies   Allergen Reactions   • Latex        Past Surgical History:   Procedure Laterality Date   • CARDIAC CATHETERIZATION  2014   • CARDIOVASCULAR STRESS TEST  2014   • ECHO - CONVERTED  2014   • ENDOSCOPY  2015   • HYSTERECTOMY  2002    benign   • LAPAROSCOPIC CHOLECYSTECTOMY  2001       Family History   Problem Relation Age of Onset   • Heart attack Mother    • Heart failure Mother    • Hypertension Mother    • Heart attack Father    • Hypertension Father    • Heart block Brother    • Heart block Sister    • Heart block Sister    • Heart attack Brother    • Heart block Brother    • Stroke Brother    • Breast cancer Neg Hx        Social History     Socioeconomic History   • Marital status:      Spouse name: Not on file   • Number of children: Not on file   • Years of education: Not on file   • Highest education level: Not on file   Tobacco Use   • Smoking status: Never Smoker   • Smokeless  tobacco: Never Used   Substance and Sexual Activity   • Alcohol use: No   • Drug use: No   • Sexual activity: Defer           Objective   Physical Exam   Constitutional: She is oriented to person, place, and time. She appears well-developed and well-nourished. No distress.   HENT:   Head: Normocephalic and atraumatic.   Right Ear: External ear normal.   Left Ear: External ear normal.   Nose: Nose normal.   Eyes: Conjunctivae and EOM are normal. Pupils are equal, round, and reactive to light.   Neck: Normal range of motion. Neck supple. No JVD present. No tracheal deviation present.   Cardiovascular: Normal rate, regular rhythm and normal heart sounds.   No murmur heard.  Pulmonary/Chest: Effort normal and breath sounds normal. No respiratory distress. She has no wheezes.   Abdominal: Soft. Bowel sounds are normal. There is no tenderness. There is CVA tenderness.   Musculoskeletal: Normal range of motion. She exhibits no edema or deformity.   Neurological: She is alert and oriented to person, place, and time. No cranial nerve deficit.   Skin: Skin is warm and dry. No rash noted. She is not diaphoretic. No erythema. No pallor.   Psychiatric: She has a normal mood and affect. Her behavior is normal. Thought content normal.   Nursing note and vitals reviewed.      Procedures           ED Course                  MDM  Number of Diagnoses or Management Options  Left ureteral stone: new and does not require workup     Amount and/or Complexity of Data Reviewed  Clinical lab tests: reviewed  Tests in the radiology section of CPT®: reviewed        Final diagnoses:   Left ureteral stone              Jeanne Morales, APRN  09/07/19 0233

## 2019-09-08 ENCOUNTER — NURSE TRIAGE (OUTPATIENT)
Dept: CALL CENTER | Facility: HOSPITAL | Age: 74
End: 2019-09-08

## 2019-09-08 NOTE — TELEPHONE ENCOUNTER
"Caller has questions about kidney stones.  Her mom wasdiagnosed with one yesterday.  Questions answered.    Reason for Disposition  • Health Information question, no triage required and triager able to answer question    Additional Information  • Negative: [1] Caller is not with the adult (patient) AND [2] reporting urgent symptoms  • Negative: Lab result questions  • Negative: Medication questions  • Negative: Caller cannot be reached by phone  • Negative: Caller has already spoken to PCP or another triager  • Negative: RN needs further essential information from caller in order to complete triage  • Negative: Requesting regular office appointment  • Negative: [1] Caller requesting NON-URGENT health information AND [2] PCP's office is the best resource  • Negative: General information question, no triage required and triager able to answer question    Answer Assessment - Initial Assessment Questions  1. REASON FOR CALL or QUESTION: \"What is your reason for calling today?\" or \"How can I best help you?\" or \"What question do you have that I can help answer?\"      I have a question about my mom's kidney stone.    Protocols used: INFORMATION ONLY CALL-ADULT-      "

## 2019-09-10 ENCOUNTER — OFFICE VISIT (OUTPATIENT)
Dept: UROLOGY | Facility: CLINIC | Age: 74
End: 2019-09-10

## 2019-09-10 VITALS
SYSTOLIC BLOOD PRESSURE: 110 MMHG | DIASTOLIC BLOOD PRESSURE: 64 MMHG | TEMPERATURE: 98.9 F | WEIGHT: 138 LBS | BODY MASS INDEX: 22.18 KG/M2 | HEIGHT: 66 IN

## 2019-09-10 DIAGNOSIS — N20.0 RENAL CALCULUS, LEFT: Primary | ICD-10-CM

## 2019-09-10 PROCEDURE — 99204 OFFICE O/P NEW MOD 45 MIN: CPT | Performed by: UROLOGY

## 2019-09-10 RX ORDER — OXYCODONE AND ACETAMINOPHEN 10; 325 MG/1; MG/1
1 TABLET ORAL EVERY 6 HOURS PRN
Qty: 15 TABLET | Refills: 0 | Status: SHIPPED | OUTPATIENT
Start: 2019-09-10 | End: 2019-10-01

## 2019-09-10 RX ORDER — GENTAMICIN SULFATE 80 MG/100ML
80 INJECTION, SOLUTION INTRAVENOUS ONCE
Status: CANCELLED | OUTPATIENT
Start: 2019-09-13 | End: 2019-09-10

## 2019-09-10 NOTE — PROGRESS NOTES
Chief Complaint:          Chief Complaint   Patient presents with   • Uretheral Stone       HPI:   74 y.o. female.  who is referred with a 5 mm upper ureteral obstructing stone causing severe pain she is actually sitting there quite uncomfortably with colic.  Started Friday this is her first stone she went to emergency room I reviewed it showing a 5 mm obstructing left proximal stone she has no allergies she takes only baby aspirin she is had a cholecystectomy she is a  0 para 0 with normal bowel movements.  Renal calculus-we discussed the presence of the stone we discussed the various therapeutic options available including percutaneous nephrostolithotomy, lithotripsy.  We discussed the risks of lithotripsy including the passage of stones the development of a large string of stones in the distal ureter known as Steinstrasse.  In the 3% incidence of that we will need to proceed with a ureteroscopy for obstructing fragments.  Extremely rare incidence of renal hematoma.  And the significance of this.  We discussed the absolute relative indicators for intervention including the presence of sepsis, and pain we cannot control is the primary need for urgent intervention.  We discussed placement of a stent if indicated and the management of the stent as well.   ESWL-the patient is a candidate for extracorporeal shockwave lithotripsy.  We discussed the type of stone.  And the complications associated with the procedure including but not limited to pain in the flank, hematoma, spontaneous renal hemorrhage, and adequate fragmentation of stones.  The need for passage of the stones.  The need for concomitant additional procedures in the range of 24% the risk of a distal fragment in the range of 3% requiring ureteroscopic removal..  Fact that sometimes a stent is indicated based on the size and the density of the stone as determined on the CAT scan.     Past Medical History:        Past Medical History:   Diagnosis Date    • Hyperlipidemia    • Osteoporosis    • Tongue irritation    • Weight loss          Current Meds:     Current Outpatient Medications   Medication Sig Dispense Refill   • aspirin 81 MG EC tablet Take 81 mg by mouth daily.     • cholecalciferol (VITAMIN D3) 1000 UNITS tablet Take 1,000 Units by mouth daily.     • fluconazole (DIFLUCAN) 200 MG tablet Take 1 tablet by mouth Daily. 4 tablet 0   • miconazole (MICOTIN) 2 % vaginal cream Insert 1 applicator into the vagina Every Night. 45 g 2   • montelukast (SINGULAIR) 10 MG tablet TAKE 1 TABLET BY MOUTH EVERY NIGHT 90 tablet 0   • Multiple Vitamin (MULTI VITAMIN DAILY PO) Take  by mouth.     • nystatin (MYCOSTATIN) 691932 UNIT/GM cream Apply  topically to the appropriate area as directed 2 (Two) Times a Day. 30 g 0   • Omega-3 Fatty Acids (FISH OIL) 1000 MG capsule capsule Take 1,000 mg by mouth 2 (two) times a day with meals.     • ondansetron ODT (ZOFRAN-ODT) 4 MG disintegrating tablet Take 1 tablet by mouth Every 6 (Six) Hours As Needed for Nausea. 15 tablet 0   • oxybutynin (DITROPAN) 5 MG tablet Take 1 tablet by mouth 2 (Two) Times a Day. 180 tablet 0   • oxyCODONE-acetaminophen (PERCOCET) 5-325 MG per tablet Take 1 tablet by mouth Every 4 (Four) Hours As Needed for Moderate Pain . 12 tablet 0   • pantoprazole (PROTONIX) 40 MG EC tablet Take 1 tablet by mouth Daily. 90 tablet 3   • simvastatin (ZOCOR) 20 MG tablet Take 1 tablet by mouth Every Night. 90 tablet 3   • sulfamethoxazole-trimethoprim (BACTRIM DS,SEPTRA DS) 800-160 MG per tablet Take 1 tablet by mouth 2 (Two) Times a Day for 10 days. 20 tablet 0   • tamsulosin (FLOMAX) 0.4 MG capsule 24 hr capsule Take 1 capsule by mouth Daily. 30 capsule 0   • vitamin B-12 (CYANOCOBALAMIN) 1000 MCG tablet Take 1,000 mcg by mouth daily.       No current facility-administered medications for this visit.         Allergies:      Allergies   Allergen Reactions   • Latex         Past Surgical History:     Past Surgical  History:   Procedure Laterality Date   • CARDIAC CATHETERIZATION  2014   • CARDIOVASCULAR STRESS TEST  2014   • ECHO - CONVERTED  2014   • ENDOSCOPY  2015   • HYSTERECTOMY  2002    benign   • LAPAROSCOPIC CHOLECYSTECTOMY  2001         Social History:     Social History     Socioeconomic History   • Marital status:      Spouse name: Not on file   • Number of children: Not on file   • Years of education: Not on file   • Highest education level: Not on file   Tobacco Use   • Smoking status: Never Smoker   • Smokeless tobacco: Never Used   Substance and Sexual Activity   • Alcohol use: No   • Drug use: No   • Sexual activity: Defer       Family History:     Family History   Problem Relation Age of Onset   • Heart attack Mother    • Heart failure Mother    • Hypertension Mother    • Heart attack Father    • Hypertension Father    • Heart block Brother    • Heart block Sister    • Heart block Sister    • Heart attack Brother    • Heart block Brother    • Stroke Brother    • Breast cancer Neg Hx        Review of Systems:     Review of Systems   Constitutional: Negative.  Negative for activity change, appetite change, chills, diaphoresis, fatigue and unexpected weight change.   HENT: Negative for congestion, dental problem, drooling, ear discharge, ear pain, facial swelling, hearing loss, mouth sores, nosebleeds, postnasal drip, rhinorrhea, sinus pressure, sneezing, sore throat, tinnitus, trouble swallowing and voice change.    Eyes: Negative.  Negative for photophobia, pain, discharge, redness, itching and visual disturbance.   Respiratory: Negative.  Negative for apnea, cough, choking, chest tightness, shortness of breath, wheezing and stridor.    Cardiovascular: Negative.  Negative for chest pain, palpitations and leg swelling.   Gastrointestinal: Negative.  Negative for abdominal distention, abdominal pain, anal bleeding, blood in stool, constipation, diarrhea, nausea, rectal pain and vomiting.   Endocrine:  Negative.  Negative for cold intolerance, heat intolerance, polydipsia, polyphagia and polyuria.   Genitourinary: Positive for flank pain.   Musculoskeletal: Negative for arthralgias, back pain, gait problem, joint swelling, myalgias, neck pain and neck stiffness.   Skin: Negative.  Negative for color change, pallor, rash and wound.   Allergic/Immunologic: Negative.  Negative for environmental allergies, food allergies and immunocompromised state.   Neurological: Negative.  Negative for dizziness, tremors, seizures, syncope, facial asymmetry, speech difficulty, weakness, light-headedness, numbness and headaches.   Hematological: Negative.  Negative for adenopathy. Does not bruise/bleed easily.   Psychiatric/Behavioral: Negative for agitation, behavioral problems, confusion, decreased concentration, dysphoric mood, hallucinations, self-injury, sleep disturbance and suicidal ideas. The patient is not nervous/anxious and is not hyperactive.    All other systems reviewed and are negative.      Physical Exam:     Physical Exam   Constitutional: She appears well-developed and well-nourished.   HENT:   Head: Normocephalic and atraumatic.   Right Ear: External ear normal.   Left Ear: External ear normal.   Mouth/Throat: Oropharynx is clear and moist.   Eyes: Conjunctivae are normal. Pupils are equal, round, and reactive to light.   Cardiovascular: Normal rate, regular rhythm, normal heart sounds and intact distal pulses.   Pulmonary/Chest: Effort normal and breath sounds normal.   Abdominal: Soft. Bowel sounds are normal. She exhibits no distension and no mass. There is no tenderness. There is no rebound and no guarding.   Genitourinary: No vaginal discharge found.   Musculoskeletal: Normal range of motion.   Neurological: She is alert. She has normal reflexes.   Skin: Skin is warm and dry.   Psychiatric: She has a normal mood and affect. Her behavior is normal. Judgment and thought content normal.       I have reviewed the  following portions of the patient's history: allergies, current medications, past family history, past medical history, past social history, past surgical history, problem list and ROS and confirm it's accurate.      Procedure:       Assessment/Plan:   Renal calculus-we discussed the presence of the stone we discussed the various therapeutic options available including percutaneous nephrostolithotomy, lithotripsy.  We discussed the risks of lithotripsy including the passage of stones the development of a large string of stones in the distal ureter known as Steinstrasse.  In the 3% incidence of that we will need to proceed with a ureteroscopy for obstructing fragments.  Extremely rare incidence of renal hematoma.  And the significance of this.  We discussed the absolute relative indicators for intervention including the presence of sepsis, and pain we cannot control is the primary need for urgent intervention.  We discussed placement of a stent if indicated and the management of the stent as well.  For lithotripsy on September 13 she will need a preop KUB.  Narcotic pain medication-patient has significant acute pain that I believe would be an indication for the use of narcotic pain medication.  I discussed the significant risks of pain medication and the fact that this will be a short only option and I will give her no more than a three-day supply of pain medication.  And I will not plan long-term medication and that this will be sent to a pain clinic if at all becomes necessary.  We discussed signing a pain medication agreement and the fact that we're going to run a state [facility] review to be sure the patient is not getting pain medication from elsewhere.  If this is the case we will not give pain medication.  As part of the patient's treatment plan of there being prescribed a controlled substance with potential for abuse.  This patient has been wait aware of the appropriate dose of such medications including, the  risk for somnolence, limited ability to drive and/or safety and the significant potential for overdose.  It is been made clear that these medications are for the prescribed patient only without concomitant use of alcohol or other sepsis unless prescribed by the medical provider.  Has completed prescribing agreement detailing the terms of continue prescribing him a controlled substance.  Including monitoring Cecilio ports, the possibility of urine drug screens and pedal counts.  The patient is aware that we reviewed CECILIO reports on a regular basis and scan them into the chart.  History and physical examination exhibited continued safe and appropriate use of controlled substances.  Also discussed the fact that the new [place] legislation allows only a three-day prescription for pain medication.  In this situation he will be referred to a chronic pain clinic.      Patient reports that she is not currently experiencing any symptoms of urinary incontinence.      Patient's Body mass index is 22.28 kg/m². BMI is within normal parameters. No follow-up required..              This document has been electronically signed by FORTINO ZARATE MD September 10, 2019 9:34 AM

## 2019-09-10 NOTE — H&P (VIEW-ONLY)
Chief Complaint:          Chief Complaint   Patient presents with   • Uretheral Stone       HPI:   74 y.o. female.  who is referred with a 5 mm upper ureteral obstructing stone causing severe pain she is actually sitting there quite uncomfortably with colic.  Started Friday this is her first stone she went to emergency room I reviewed it showing a 5 mm obstructing left proximal stone she has no allergies she takes only baby aspirin she is had a cholecystectomy she is a  0 para 0 with normal bowel movements.  Renal calculus-we discussed the presence of the stone we discussed the various therapeutic options available including percutaneous nephrostolithotomy, lithotripsy.  We discussed the risks of lithotripsy including the passage of stones the development of a large string of stones in the distal ureter known as Steinstrasse.  In the 3% incidence of that we will need to proceed with a ureteroscopy for obstructing fragments.  Extremely rare incidence of renal hematoma.  And the significance of this.  We discussed the absolute relative indicators for intervention including the presence of sepsis, and pain we cannot control is the primary need for urgent intervention.  We discussed placement of a stent if indicated and the management of the stent as well.   ESWL-the patient is a candidate for extracorporeal shockwave lithotripsy.  We discussed the type of stone.  And the complications associated with the procedure including but not limited to pain in the flank, hematoma, spontaneous renal hemorrhage, and adequate fragmentation of stones.  The need for passage of the stones.  The need for concomitant additional procedures in the range of 24% the risk of a distal fragment in the range of 3% requiring ureteroscopic removal..  Fact that sometimes a stent is indicated based on the size and the density of the stone as determined on the CAT scan.     Past Medical History:        Past Medical History:   Diagnosis Date      • Hyperlipidemia    • Osteoporosis    • Tongue irritation    • Weight loss          Current Meds:     Current Outpatient Medications   Medication Sig Dispense Refill   • aspirin 81 MG EC tablet Take 81 mg by mouth daily.     • cholecalciferol (VITAMIN D3) 1000 UNITS tablet Take 1,000 Units by mouth daily.     • fluconazole (DIFLUCAN) 200 MG tablet Take 1 tablet by mouth Daily. 4 tablet 0   • miconazole (MICOTIN) 2 % vaginal cream Insert 1 applicator into the vagina Every Night. 45 g 2   • montelukast (SINGULAIR) 10 MG tablet TAKE 1 TABLET BY MOUTH EVERY NIGHT 90 tablet 0   • Multiple Vitamin (MULTI VITAMIN DAILY PO) Take  by mouth.     • nystatin (MYCOSTATIN) 363323 UNIT/GM cream Apply  topically to the appropriate area as directed 2 (Two) Times a Day. 30 g 0   • Omega-3 Fatty Acids (FISH OIL) 1000 MG capsule capsule Take 1,000 mg by mouth 2 (two) times a day with meals.     • ondansetron ODT (ZOFRAN-ODT) 4 MG disintegrating tablet Take 1 tablet by mouth Every 6 (Six) Hours As Needed for Nausea. 15 tablet 0   • oxybutynin (DITROPAN) 5 MG tablet Take 1 tablet by mouth 2 (Two) Times a Day. 180 tablet 0   • oxyCODONE-acetaminophen (PERCOCET) 5-325 MG per tablet Take 1 tablet by mouth Every 4 (Four) Hours As Needed for Moderate Pain . 12 tablet 0   • pantoprazole (PROTONIX) 40 MG EC tablet Take 1 tablet by mouth Daily. 90 tablet 3   • simvastatin (ZOCOR) 20 MG tablet Take 1 tablet by mouth Every Night. 90 tablet 3   • sulfamethoxazole-trimethoprim (BACTRIM DS,SEPTRA DS) 800-160 MG per tablet Take 1 tablet by mouth 2 (Two) Times a Day for 10 days. 20 tablet 0   • tamsulosin (FLOMAX) 0.4 MG capsule 24 hr capsule Take 1 capsule by mouth Daily. 30 capsule 0   • vitamin B-12 (CYANOCOBALAMIN) 1000 MCG tablet Take 1,000 mcg by mouth daily.       No current facility-administered medications for this visit.         Allergies:      Allergies   Allergen Reactions   • Latex         Past Surgical History:     Past Surgical  History:   Procedure Laterality Date   • CARDIAC CATHETERIZATION  2014   • CARDIOVASCULAR STRESS TEST  2014   • ECHO - CONVERTED  2014   • ENDOSCOPY  2015   • HYSTERECTOMY  2002    benign   • LAPAROSCOPIC CHOLECYSTECTOMY  2001         Social History:     Social History     Socioeconomic History   • Marital status:      Spouse name: Not on file   • Number of children: Not on file   • Years of education: Not on file   • Highest education level: Not on file   Tobacco Use   • Smoking status: Never Smoker   • Smokeless tobacco: Never Used   Substance and Sexual Activity   • Alcohol use: No   • Drug use: No   • Sexual activity: Defer       Family History:     Family History   Problem Relation Age of Onset   • Heart attack Mother    • Heart failure Mother    • Hypertension Mother    • Heart attack Father    • Hypertension Father    • Heart block Brother    • Heart block Sister    • Heart block Sister    • Heart attack Brother    • Heart block Brother    • Stroke Brother    • Breast cancer Neg Hx        Review of Systems:     Review of Systems   Constitutional: Negative.  Negative for activity change, appetite change, chills, diaphoresis, fatigue and unexpected weight change.   HENT: Negative for congestion, dental problem, drooling, ear discharge, ear pain, facial swelling, hearing loss, mouth sores, nosebleeds, postnasal drip, rhinorrhea, sinus pressure, sneezing, sore throat, tinnitus, trouble swallowing and voice change.    Eyes: Negative.  Negative for photophobia, pain, discharge, redness, itching and visual disturbance.   Respiratory: Negative.  Negative for apnea, cough, choking, chest tightness, shortness of breath, wheezing and stridor.    Cardiovascular: Negative.  Negative for chest pain, palpitations and leg swelling.   Gastrointestinal: Negative.  Negative for abdominal distention, abdominal pain, anal bleeding, blood in stool, constipation, diarrhea, nausea, rectal pain and vomiting.   Endocrine:  Negative.  Negative for cold intolerance, heat intolerance, polydipsia, polyphagia and polyuria.   Genitourinary: Positive for flank pain.   Musculoskeletal: Negative for arthralgias, back pain, gait problem, joint swelling, myalgias, neck pain and neck stiffness.   Skin: Negative.  Negative for color change, pallor, rash and wound.   Allergic/Immunologic: Negative.  Negative for environmental allergies, food allergies and immunocompromised state.   Neurological: Negative.  Negative for dizziness, tremors, seizures, syncope, facial asymmetry, speech difficulty, weakness, light-headedness, numbness and headaches.   Hematological: Negative.  Negative for adenopathy. Does not bruise/bleed easily.   Psychiatric/Behavioral: Negative for agitation, behavioral problems, confusion, decreased concentration, dysphoric mood, hallucinations, self-injury, sleep disturbance and suicidal ideas. The patient is not nervous/anxious and is not hyperactive.    All other systems reviewed and are negative.      Physical Exam:     Physical Exam   Constitutional: She appears well-developed and well-nourished.   HENT:   Head: Normocephalic and atraumatic.   Right Ear: External ear normal.   Left Ear: External ear normal.   Mouth/Throat: Oropharynx is clear and moist.   Eyes: Conjunctivae are normal. Pupils are equal, round, and reactive to light.   Cardiovascular: Normal rate, regular rhythm, normal heart sounds and intact distal pulses.   Pulmonary/Chest: Effort normal and breath sounds normal.   Abdominal: Soft. Bowel sounds are normal. She exhibits no distension and no mass. There is no tenderness. There is no rebound and no guarding.   Genitourinary: No vaginal discharge found.   Musculoskeletal: Normal range of motion.   Neurological: She is alert. She has normal reflexes.   Skin: Skin is warm and dry.   Psychiatric: She has a normal mood and affect. Her behavior is normal. Judgment and thought content normal.       I have reviewed the  following portions of the patient's history: allergies, current medications, past family history, past medical history, past social history, past surgical history, problem list and ROS and confirm it's accurate.      Procedure:       Assessment/Plan:   Renal calculus-we discussed the presence of the stone we discussed the various therapeutic options available including percutaneous nephrostolithotomy, lithotripsy.  We discussed the risks of lithotripsy including the passage of stones the development of a large string of stones in the distal ureter known as Steinstrasse.  In the 3% incidence of that we will need to proceed with a ureteroscopy for obstructing fragments.  Extremely rare incidence of renal hematoma.  And the significance of this.  We discussed the absolute relative indicators for intervention including the presence of sepsis, and pain we cannot control is the primary need for urgent intervention.  We discussed placement of a stent if indicated and the management of the stent as well.  For lithotripsy on September 13 she will need a preop KUB.  Narcotic pain medication-patient has significant acute pain that I believe would be an indication for the use of narcotic pain medication.  I discussed the significant risks of pain medication and the fact that this will be a short only option and I will give her no more than a three-day supply of pain medication.  And I will not plan long-term medication and that this will be sent to a pain clinic if at all becomes necessary.  We discussed signing a pain medication agreement and the fact that we're going to run a state [facility] review to be sure the patient is not getting pain medication from elsewhere.  If this is the case we will not give pain medication.  As part of the patient's treatment plan of there being prescribed a controlled substance with potential for abuse.  This patient has been wait aware of the appropriate dose of such medications including, the  risk for somnolence, limited ability to drive and/or safety and the significant potential for overdose.  It is been made clear that these medications are for the prescribed patient only without concomitant use of alcohol or other sepsis unless prescribed by the medical provider.  Has completed prescribing agreement detailing the terms of continue prescribing him a controlled substance.  Including monitoring Cecilio ports, the possibility of urine drug screens and pedal counts.  The patient is aware that we reviewed CECILIO reports on a regular basis and scan them into the chart.  History and physical examination exhibited continued safe and appropriate use of controlled substances.  Also discussed the fact that the new [place] legislation allows only a three-day prescription for pain medication.  In this situation he will be referred to a chronic pain clinic.      Patient reports that she is not currently experiencing any symptoms of urinary incontinence.      Patient's Body mass index is 22.28 kg/m². BMI is within normal parameters. No follow-up required..              This document has been electronically signed by FORTINO ZARATE MD September 10, 2019 9:34 AM

## 2019-09-12 ENCOUNTER — APPOINTMENT (OUTPATIENT)
Dept: PREADMISSION TESTING | Facility: HOSPITAL | Age: 74
End: 2019-09-12

## 2019-09-13 ENCOUNTER — APPOINTMENT (OUTPATIENT)
Dept: GENERAL RADIOLOGY | Facility: HOSPITAL | Age: 74
End: 2019-09-13

## 2019-09-13 ENCOUNTER — ANESTHESIA (OUTPATIENT)
Dept: PERIOP | Facility: HOSPITAL | Age: 74
End: 2019-09-13

## 2019-09-13 ENCOUNTER — HOSPITAL ENCOUNTER (OUTPATIENT)
Facility: HOSPITAL | Age: 74
Discharge: HOME | End: 2019-09-13
Attending: UROLOGY | Admitting: UROLOGY

## 2019-09-13 ENCOUNTER — ANESTHESIA EVENT (OUTPATIENT)
Dept: PERIOP | Facility: HOSPITAL | Age: 74
End: 2019-09-13

## 2019-09-13 VITALS
HEART RATE: 75 BPM | TEMPERATURE: 97.6 F | SYSTOLIC BLOOD PRESSURE: 125 MMHG | HEIGHT: 66 IN | OXYGEN SATURATION: 99 % | BODY MASS INDEX: 22.18 KG/M2 | RESPIRATION RATE: 18 BRPM | WEIGHT: 138 LBS | DIASTOLIC BLOOD PRESSURE: 59 MMHG

## 2019-09-13 DIAGNOSIS — N20.0 RENAL CALCULUS, LEFT: ICD-10-CM

## 2019-09-13 PROCEDURE — 25010000002 FENTANYL CITRATE (PF) 100 MCG/2ML SOLUTION: Performed by: NURSE ANESTHETIST, CERTIFIED REGISTERED

## 2019-09-13 PROCEDURE — 25010000002 DEXAMETHASONE PER 1 MG: Performed by: NURSE ANESTHETIST, CERTIFIED REGISTERED

## 2019-09-13 PROCEDURE — 25010000002 KETOROLAC TROMETHAMINE PER 15 MG: Performed by: NURSE ANESTHETIST, CERTIFIED REGISTERED

## 2019-09-13 PROCEDURE — 25010000002 PROPOFOL 10 MG/ML EMULSION: Performed by: NURSE ANESTHETIST, CERTIFIED REGISTERED

## 2019-09-13 PROCEDURE — 74018 RADEX ABDOMEN 1 VIEW: CPT | Performed by: RADIOLOGY

## 2019-09-13 PROCEDURE — 74018 RADEX ABDOMEN 1 VIEW: CPT

## 2019-09-13 PROCEDURE — 50590 FRAGMENTING OF KIDNEY STONE: CPT | Performed by: UROLOGY

## 2019-09-13 PROCEDURE — 25010000002 ONDANSETRON PER 1 MG: Performed by: NURSE ANESTHETIST, CERTIFIED REGISTERED

## 2019-09-13 PROCEDURE — 25010000002 GENTAMICIN PER 80 MG: Performed by: UROLOGY

## 2019-09-13 RX ORDER — FENTANYL CITRATE 50 UG/ML
INJECTION, SOLUTION INTRAMUSCULAR; INTRAVENOUS AS NEEDED
Status: DISCONTINUED | OUTPATIENT
Start: 2019-09-13 | End: 2019-09-13 | Stop reason: SURG

## 2019-09-13 RX ORDER — OXYCODONE AND ACETAMINOPHEN 10; 325 MG/1; MG/1
1 TABLET ORAL EVERY 4 HOURS PRN
Qty: 10 TABLET | Refills: 0 | Status: SHIPPED | OUTPATIENT
Start: 2019-09-13 | End: 2019-10-01

## 2019-09-13 RX ORDER — DEXAMETHASONE SODIUM PHOSPHATE 4 MG/ML
INJECTION, SOLUTION INTRA-ARTICULAR; INTRALESIONAL; INTRAMUSCULAR; INTRAVENOUS; SOFT TISSUE AS NEEDED
Status: DISCONTINUED | OUTPATIENT
Start: 2019-09-13 | End: 2019-09-13 | Stop reason: SURG

## 2019-09-13 RX ORDER — LIDOCAINE HYDROCHLORIDE 20 MG/ML
INJECTION, SOLUTION INFILTRATION; PERINEURAL AS NEEDED
Status: DISCONTINUED | OUTPATIENT
Start: 2019-09-13 | End: 2019-09-13 | Stop reason: SURG

## 2019-09-13 RX ORDER — FENTANYL CITRATE 50 UG/ML
50 INJECTION, SOLUTION INTRAMUSCULAR; INTRAVENOUS
Status: DISCONTINUED | OUTPATIENT
Start: 2019-09-13 | End: 2019-09-13 | Stop reason: HOSPADM

## 2019-09-13 RX ORDER — ONDANSETRON 2 MG/ML
4 INJECTION INTRAMUSCULAR; INTRAVENOUS AS NEEDED
Status: DISCONTINUED | OUTPATIENT
Start: 2019-09-13 | End: 2019-09-13 | Stop reason: HOSPADM

## 2019-09-13 RX ORDER — SODIUM CHLORIDE, SODIUM LACTATE, POTASSIUM CHLORIDE, CALCIUM CHLORIDE 600; 310; 30; 20 MG/100ML; MG/100ML; MG/100ML; MG/100ML
125 INJECTION, SOLUTION INTRAVENOUS CONTINUOUS
Status: DISCONTINUED | OUTPATIENT
Start: 2019-09-13 | End: 2019-09-13 | Stop reason: HOSPADM

## 2019-09-13 RX ORDER — FAMOTIDINE 10 MG/ML
INJECTION, SOLUTION INTRAVENOUS AS NEEDED
Status: DISCONTINUED | OUTPATIENT
Start: 2019-09-13 | End: 2019-09-13 | Stop reason: SURG

## 2019-09-13 RX ORDER — KETOROLAC TROMETHAMINE 30 MG/ML
INJECTION, SOLUTION INTRAMUSCULAR; INTRAVENOUS AS NEEDED
Status: DISCONTINUED | OUTPATIENT
Start: 2019-09-13 | End: 2019-09-13 | Stop reason: SURG

## 2019-09-13 RX ORDER — MEPERIDINE HYDROCHLORIDE 25 MG/ML
12.5 INJECTION INTRAMUSCULAR; INTRAVENOUS; SUBCUTANEOUS
Status: DISCONTINUED | OUTPATIENT
Start: 2019-09-13 | End: 2019-09-13 | Stop reason: HOSPADM

## 2019-09-13 RX ORDER — ONDANSETRON 2 MG/ML
INJECTION INTRAMUSCULAR; INTRAVENOUS AS NEEDED
Status: DISCONTINUED | OUTPATIENT
Start: 2019-09-13 | End: 2019-09-13 | Stop reason: SURG

## 2019-09-13 RX ORDER — SODIUM CHLORIDE 0.9 % (FLUSH) 0.9 %
3-10 SYRINGE (ML) INJECTION AS NEEDED
Status: DISCONTINUED | OUTPATIENT
Start: 2019-09-13 | End: 2019-09-13 | Stop reason: HOSPADM

## 2019-09-13 RX ORDER — SODIUM CHLORIDE 0.9 % (FLUSH) 0.9 %
3 SYRINGE (ML) INJECTION EVERY 12 HOURS SCHEDULED
Status: DISCONTINUED | OUTPATIENT
Start: 2019-09-13 | End: 2019-09-13 | Stop reason: HOSPADM

## 2019-09-13 RX ORDER — PROPOFOL 10 MG/ML
VIAL (ML) INTRAVENOUS AS NEEDED
Status: DISCONTINUED | OUTPATIENT
Start: 2019-09-13 | End: 2019-09-13 | Stop reason: SURG

## 2019-09-13 RX ORDER — MIDAZOLAM HYDROCHLORIDE 1 MG/ML
2 INJECTION INTRAMUSCULAR; INTRAVENOUS
Status: DISCONTINUED | OUTPATIENT
Start: 2019-09-13 | End: 2019-09-13 | Stop reason: HOSPADM

## 2019-09-13 RX ORDER — MIDAZOLAM HYDROCHLORIDE 1 MG/ML
1 INJECTION INTRAMUSCULAR; INTRAVENOUS
Status: DISCONTINUED | OUTPATIENT
Start: 2019-09-13 | End: 2019-09-13 | Stop reason: HOSPADM

## 2019-09-13 RX ORDER — OXYCODONE HYDROCHLORIDE AND ACETAMINOPHEN 5; 325 MG/1; MG/1
1 TABLET ORAL ONCE AS NEEDED
Status: DISCONTINUED | OUTPATIENT
Start: 2019-09-13 | End: 2019-09-13 | Stop reason: HOSPADM

## 2019-09-13 RX ORDER — GENTAMICIN SULFATE 80 MG/100ML
80 INJECTION, SOLUTION INTRAVENOUS ONCE
Status: COMPLETED | OUTPATIENT
Start: 2019-09-13 | End: 2019-09-13

## 2019-09-13 RX ORDER — IPRATROPIUM BROMIDE AND ALBUTEROL SULFATE 2.5; .5 MG/3ML; MG/3ML
3 SOLUTION RESPIRATORY (INHALATION) ONCE AS NEEDED
Status: DISCONTINUED | OUTPATIENT
Start: 2019-09-13 | End: 2019-09-13 | Stop reason: HOSPADM

## 2019-09-13 RX ADMIN — KETOROLAC TROMETHAMINE 15 MG: 30 INJECTION, SOLUTION INTRAMUSCULAR; INTRAVENOUS at 14:09

## 2019-09-13 RX ADMIN — PROPOFOL 150 MG: 10 INJECTION, EMULSION INTRAVENOUS at 13:34

## 2019-09-13 RX ADMIN — DEXAMETHASONE SODIUM PHOSPHATE 4 MG: 4 INJECTION, SOLUTION INTRAMUSCULAR; INTRAVENOUS at 13:42

## 2019-09-13 RX ADMIN — PROPOFOL 50 MG: 10 INJECTION, EMULSION INTRAVENOUS at 13:36

## 2019-09-13 RX ADMIN — ONDANSETRON 4 MG: 2 INJECTION INTRAMUSCULAR; INTRAVENOUS at 13:42

## 2019-09-13 RX ADMIN — EPHEDRINE SULFATE 5 MG: 50 INJECTION INTRAMUSCULAR; INTRAVENOUS; SUBCUTANEOUS at 13:50

## 2019-09-13 RX ADMIN — FAMOTIDINE 20 MG: 10 INJECTION INTRAVENOUS at 13:42

## 2019-09-13 RX ADMIN — FENTANYL CITRATE 50 MCG: 50 INJECTION INTRAMUSCULAR; INTRAVENOUS at 13:29

## 2019-09-13 RX ADMIN — GENTAMICIN SULFATE 80 MG: 80 INJECTION, SOLUTION INTRAVENOUS at 13:29

## 2019-09-13 RX ADMIN — EPHEDRINE SULFATE 5 MG: 50 INJECTION INTRAMUSCULAR; INTRAVENOUS; SUBCUTANEOUS at 13:58

## 2019-09-13 RX ADMIN — LIDOCAINE HYDROCHLORIDE 60 MG: 20 INJECTION, SOLUTION INFILTRATION; PERINEURAL at 13:34

## 2019-09-13 RX ADMIN — EPHEDRINE SULFATE 5 MG: 50 INJECTION INTRAMUSCULAR; INTRAVENOUS; SUBCUTANEOUS at 13:45

## 2019-09-13 RX ADMIN — FENTANYL CITRATE 50 MCG: 50 INJECTION INTRAMUSCULAR; INTRAVENOUS at 14:09

## 2019-09-13 NOTE — ANESTHESIA PREPROCEDURE EVALUATION
Anesthesia Evaluation     no history of anesthetic complications:  NPO Solid Status: > 8 hours  NPO Liquid Status: > 8 hours           Airway   Mallampati: II  TM distance: >3 FB  Neck ROM: full  No difficulty expected  Dental - normal exam     Pulmonary - normal exam   (+) asthma,   Cardiovascular - normal exam    (+) hyperlipidemia,       Neuro/Psych  GI/Hepatic/Renal/Endo    (+)  GERD,      Musculoskeletal     Abdominal  - normal exam    Bowel sounds: normal.   Substance History      OB/GYN          Other                        Anesthesia Plan    ASA 2     general     intravenous induction

## 2019-09-13 NOTE — OP NOTE
EXTRACORPOREAL SHOCKWAVE LITHOTRIPSY  Procedure Note    Elise Santamaria  9/13/2019    Pre-op Diagnosis:   Renal calculus, left [N20.0]    Post-op Diagnosis:     Post-Op Diagnosis Codes:     * Renal calculus, left [N20.0]    Procedure/CPT® Codes:  74-year-old white female with a left 5 to 6 mm upper ureteral stone for lithotripsy.  ESWL-the patient is a candidate for extracorporeal shockwave lithotripsy.  We discussed the type of stone.  And the complications associated with the procedure including but not limited to pain in the flank, hematoma, spontaneous renal hemorrhage, and adequate fragmentation of stones.  The need for passage of the stones.  The need for concomitant additional procedures in the range of 24% the risk of a distal fragment in the range of 3% requiring ureteroscopic removal..  Fact that sometimes a stent is indicated based on the size and the density of the stone as determined on the CAT scan.  Following an informed consent he is brought to the operative suite and underwent induction of general endotracheal anesthetic the stone was localized at F2 and a total of 3000 shockwaves administered without complication.  There was excellent fragmentation he was awake and alert return to recovery room          Procedure(s):  EXTRACORPOREAL SHOCKWAVE LITHOTRIPSY    Surgeon(s):  Demarco Aldana MD    Anesthesia: see anesthesia record    Staff:   Circulator: Ascencion Babcock RN  Scrub Person: Ella Bell  Assistant: Camilla Haskins    Estimated Blood Loss: none  Urine Voided: * No values recorded between 9/13/2019  1:28 PM and 9/13/2019  1:56 PM *    Specimens:                None      Drains: None    Findings: Good fragmentation    Blood: N/A    Complications: None    Grafts and Implants: None    Demarco Aldana MD     Date: 9/13/2019  Time: 1:56 PM

## 2019-09-13 NOTE — ANESTHESIA POSTPROCEDURE EVALUATION
Patient: Elise Mcdonald Hina    Procedure Summary     Date:  09/13/19 Room / Location:   [facility] /  [facility]    Anesthesia Start:  1329 Anesthesia Stop:  1409    Procedure:  EXTRACORPOREAL SHOCKWAVE LITHOTRIPSY (Left ) Diagnosis:       Renal calculus, left      (Renal calculus, left [N20.0])    Surgeon:  Demarco Aldana MD Provider:  Reno Looney MD    Anesthesia Type:  general ASA Status:  2          Anesthesia Type: general  Last vitals  BP   125/59 (09/13/19 1509)   Temp   97.6 °F (36.4 °C) (09/13/19 1448)   Pulse   75 (09/13/19 1509)   Resp   18 (09/13/19 1509)     SpO2   99 % (09/13/19 1509)     Post Anesthesia Care and Evaluation    Patient location during evaluation: PHASE II  Patient participation: complete - patient participated  Level of consciousness: awake and alert  Pain score: 0  Pain management: adequate  Airway patency: patent  Anesthetic complications: No anesthetic complications    Cardiovascular status: acceptable  Respiratory status: acceptable  Hydration status: acceptable

## 2019-09-13 NOTE — ANESTHESIA PROCEDURE NOTES
Airway  Urgency: elective    Date/Time: 9/13/2019 1:34 PM  End Time:9/13/2019 1:34 PM  Airway not difficult    General Information and Staff    Patient location during procedure: OR  Anesthesiologist: Reno Looney MD  CRNA: Sabine Duong CRNA    Indications and Patient Condition  Indications for airway management: airway protection    Preoxygenated: yes  MILS maintained throughout  Mask difficulty assessment: 1 - vent by mask    Final Airway Details  Final airway type: supraglottic airway      Successful airway: classic  Size 3    Number of attempts at approach: 2  Assessment: lips, teeth, and gum same as pre-op and atraumatic intubation    Additional Comments  AOI X3 PASS +BBS, +ETCO2, +R//F/C MOUTH TEETH GUMS SAME AS PREOP; 1 st attempt with 3 lma, unable to curve around.  2nd attempt with 4 lma, unable to curve around.  Mask pt.  Called Joni atwood to BS.  Placed 3 lma x 1 pass.  Good seal, and good entco2.  Atraumatic.

## 2019-09-19 ENCOUNTER — OFFICE VISIT (OUTPATIENT)
Dept: UROLOGY | Facility: CLINIC | Age: 74
End: 2019-09-19

## 2019-09-19 ENCOUNTER — HOSPITAL ENCOUNTER (OUTPATIENT)
Dept: GENERAL RADIOLOGY | Facility: HOSPITAL | Age: 74
Discharge: HOME | End: 2019-09-19
Admitting: UROLOGY

## 2019-09-19 VITALS — WEIGHT: 138.2 LBS | HEIGHT: 66 IN | BODY MASS INDEX: 22.21 KG/M2

## 2019-09-19 DIAGNOSIS — N20.0 KIDNEY STONE: Primary | ICD-10-CM

## 2019-09-19 DIAGNOSIS — N20.0 RENAL CALCULUS, LEFT: ICD-10-CM

## 2019-09-19 PROCEDURE — 99024 POSTOP FOLLOW-UP VISIT: CPT | Performed by: UROLOGY

## 2019-09-19 PROCEDURE — 74018 RADEX ABDOMEN 1 VIEW: CPT

## 2019-09-19 PROCEDURE — 74018 RADEX ABDOMEN 1 VIEW: CPT | Performed by: RADIOLOGY

## 2019-09-19 NOTE — PROGRESS NOTES
Chief Complaint:          Chief Complaint   Patient presents with   • Nephrolithiasis     ESWL surgery f/u        HPI:   74 y.o. female   who is referred with a 5 mm upper ureteral obstructing stone causing severe pain she is actually sitting there quite uncomfortably with colic.  Started Friday this is her first stone she went to emergency room I reviewed it showing a 5 mm obstructing left proximal stone she has no allergies she takes only baby aspirin she is had a cholecystectomy she is a  0 para 0 with normal bowel movements.  Renal calculus-we discussed the presence of the stone we discussed the various therapeutic options available including percutaneous nephrostolithotomy, lithotripsy.  We discussed the risks of lithotripsy including the passage of stones the development of a large string of stones in the distal ureter known as Steinstrasse.  In the 3% incidence of that we will need to proceed with a ureteroscopy for obstructing fragments.  Extremely rare incidence of renal hematoma.  And the significance of this.  We discussed the absolute relative indicators for intervention including the presence of sepsis, and pain we cannot control is the primary need for urgent intervention.  We discussed placement of a stent if indicated and the management of the stent as well.   ESWL-the patient is a candidate for extracorporeal shockwave lithotripsy.  We discussed the type of stone.  And the complications associated with the procedure including but not limited to pain in the flank, hematoma, spontaneous renal hemorrhage, and adequate fragmentation of stones.  The need for passage of the stones.  The need for concomitant additional procedures in the range of 24% the risk of a distal fragment in the range of 3% requiring ureteroscopic removal..  Fact that sometimes a stent is indicated based on the size and the density of the stone as determined on the CAT scan.   She returns today she is status post lithotripsy.   The stone has completely resolved I am very pleased she is completely free of pain I will see her back in 6 months to monitor for stone activity      Past Medical History:        Past Medical History:   Diagnosis Date   • Asthma    • Elevated cholesterol    • GERD (gastroesophageal reflux disease)    • Hyperlipidemia    • Kidney stones    • Osteoporosis    • Tongue irritation    • Weight loss          Current Meds:     Current Outpatient Medications   Medication Sig Dispense Refill   • aspirin 81 MG EC tablet Take 81 mg by mouth daily.     • cholecalciferol (VITAMIN D3) 1000 UNITS tablet Take 1,000 Units by mouth daily.     • montelukast (SINGULAIR) 10 MG tablet TAKE 1 TABLET BY MOUTH EVERY NIGHT 90 tablet 0   • Multiple Vitamin (MULTI VITAMIN DAILY PO) Take  by mouth.     • ondansetron ODT (ZOFRAN-ODT) 4 MG disintegrating tablet Take 1 tablet by mouth Every 6 (Six) Hours As Needed for Nausea. 15 tablet 0   • oxyCODONE-acetaminophen (PERCOCET)  MG per tablet Take 1 tablet by mouth Every 6 (Six) Hours As Needed for Moderate Pain . 15 tablet 0   • oxyCODONE-acetaminophen (PERCOCET)  MG per tablet Take 1 tablet by mouth Every 4 (Four) Hours As Needed for Moderate Pain. 10 tablet 0   • pantoprazole (PROTONIX) 40 MG EC tablet Take 1 tablet by mouth Daily. 90 tablet 3   • simvastatin (ZOCOR) 20 MG tablet Take 1 tablet by mouth Every Night. 90 tablet 3   • tamsulosin (FLOMAX) 0.4 MG capsule 24 hr capsule Take 1 capsule by mouth Daily. 30 capsule 0   • vitamin B-12 (CYANOCOBALAMIN) 1000 MCG tablet Take 1,000 mcg by mouth daily.       No current facility-administered medications for this visit.         Allergies:      Allergies   Allergen Reactions   • Latex         Past Surgical History:     Past Surgical History:   Procedure Laterality Date   • CARDIAC CATHETERIZATION  2014   • CARDIOVASCULAR STRESS TEST  2014   • COLONOSCOPY     • ECHO - CONVERTED  2014   • ENDOSCOPY  2015   • EXTRACORPOREAL SHOCK WAVE  LITHOTRIPSY (ESWL) Left 9/13/2019    Procedure: EXTRACORPOREAL SHOCKWAVE LITHOTRIPSY;  Surgeon: Demarco Aldana MD;  Location: [facility];  Service: Urology   • HYSTERECTOMY  2002    benign   • LAPAROSCOPIC CHOLECYSTECTOMY  2001         Social History:     Social History     Socioeconomic History   • Marital status:      Spouse name: Not on file   • Number of children: Not on file   • Years of education: Not on file   • Highest education level: Not on file   Tobacco Use   • Smoking status: Never Smoker   • Smokeless tobacco: Never Used   Substance and Sexual Activity   • Alcohol use: No   • Drug use: No   • Sexual activity: Defer       Family History:     Family History   Problem Relation Age of Onset   • Heart attack Mother    • Heart failure Mother    • Hypertension Mother    • Heart attack Father    • Hypertension Father    • Heart block Brother    • Heart block Sister    • Heart block Sister    • Heart attack Brother    • Heart block Brother    • Stroke Brother    • Breast cancer Neg Hx        Review of Systems:     Review of Systems   Constitutional: Negative.  Negative for activity change, appetite change, chills, diaphoresis, fatigue and unexpected weight change.   HENT: Negative for congestion, dental problem, drooling, ear discharge, ear pain, facial swelling, hearing loss, mouth sores, nosebleeds, postnasal drip, rhinorrhea, sinus pressure, sneezing, sore throat, tinnitus, trouble swallowing and voice change.    Eyes: Negative.  Negative for photophobia, pain, discharge, redness, itching and visual disturbance.   Respiratory: Negative.  Negative for apnea, cough, choking, chest tightness, shortness of breath, wheezing and stridor.    Cardiovascular: Negative.  Negative for chest pain, palpitations and leg swelling.   Gastrointestinal: Negative.  Negative for abdominal distention, abdominal pain, anal bleeding, blood in stool, constipation, diarrhea, nausea, rectal pain and vomiting.    Endocrine: Negative.  Negative for cold intolerance, heat intolerance, polydipsia, polyphagia and polyuria.   Musculoskeletal: Negative.  Negative for arthralgias, back pain, gait problem, joint swelling, myalgias, neck pain and neck stiffness.   Skin: Negative.  Negative for color change, pallor, rash and wound.   Allergic/Immunologic: Negative.  Negative for environmental allergies, food allergies and immunocompromised state.   Neurological: Negative.  Negative for dizziness, tremors, seizures, syncope, facial asymmetry, speech difficulty, weakness, light-headedness, numbness and headaches.   Hematological: Negative.  Negative for adenopathy. Does not bruise/bleed easily.   Psychiatric/Behavioral: Negative for agitation, behavioral problems, confusion, decreased concentration, dysphoric mood, hallucinations, self-injury, sleep disturbance and suicidal ideas. The patient is not nervous/anxious and is not hyperactive.    All other systems reviewed and are negative.      Physical Exam:     Physical Exam   Constitutional: She appears well-developed and well-nourished.   HENT:   Head: Normocephalic and atraumatic.   Right Ear: External ear normal.   Left Ear: External ear normal.   Mouth/Throat: Oropharynx is clear and moist.   Eyes: Conjunctivae are normal. Pupils are equal, round, and reactive to light.   Cardiovascular: Normal rate, regular rhythm, normal heart sounds and intact distal pulses.   Pulmonary/Chest: Effort normal and breath sounds normal.   Abdominal: Soft. Bowel sounds are normal. She exhibits no distension and no mass. There is no tenderness. There is no rebound and no guarding.   Genitourinary: No vaginal discharge found.   Musculoskeletal: Normal range of motion.   Neurological: She is alert. She has normal reflexes.   Skin: Skin is warm and dry.   Psychiatric: She has a normal mood and affect. Her behavior is normal. Judgment and thought content normal.       I have reviewed the following  portions of the patient's history: allergies, current medications, past family history, past medical history, past social history, past surgical history, problem list and ROS and confirm it's accurate.      Procedure:       Assessment/Plan:   Proximal ureteral stone-status post ESWL very successful she is now pain-free and no radiographic evidence of residual stones      Patient reports that she is not currently experiencing any symptoms of urinary incontinence.      Patient's Body mass index is 22.27 kg/m². BMI is within normal parameters. No follow-up required..              This document has been electronically signed by FORTINO ZARATE MD September 19, 2019 8:22 AM

## 2019-09-26 ENCOUNTER — LAB (OUTPATIENT)
Dept: LAB | Facility: HOSPITAL | Age: 74
End: 2019-09-26

## 2019-09-26 DIAGNOSIS — E55.9 VITAMIN D DEFICIENCY: ICD-10-CM

## 2019-09-26 DIAGNOSIS — E78.2 MIXED HYPERLIPIDEMIA: ICD-10-CM

## 2019-09-26 DIAGNOSIS — R53.82 CHRONIC FATIGUE: ICD-10-CM

## 2019-09-26 LAB
25(OH)D3 SERPL-MCNC: 46.5 NG/ML (ref 30–100)
ALBUMIN SERPL-MCNC: 4 G/DL (ref 3.5–5.2)
ALBUMIN/GLOB SERPL: 1.2 G/DL
ALP SERPL-CCNC: 95 U/L (ref 39–117)
ALT SERPL W P-5'-P-CCNC: 8 U/L (ref 1–33)
ANION GAP SERPL CALCULATED.3IONS-SCNC: 11.8 MMOL/L (ref 5–15)
AST SERPL-CCNC: 10 U/L (ref 1–32)
BASOPHILS # BLD AUTO: 0.05 10*3/MM3 (ref 0–0.2)
BASOPHILS NFR BLD AUTO: 0.9 % (ref 0–1.5)
BILIRUB SERPL-MCNC: 0.5 MG/DL (ref 0.2–1.2)
BUN BLD-MCNC: 15 MG/DL (ref 8–23)
BUN/CREAT SERPL: 14.3 (ref 7–25)
CALCIUM SPEC-SCNC: 9.9 MG/DL (ref 8.6–10.5)
CHLORIDE SERPL-SCNC: 103 MMOL/L (ref 98–107)
CHOLEST SERPL-MCNC: 146 MG/DL (ref 0–200)
CK SERPL-CCNC: 24 U/L (ref 20–180)
CO2 SERPL-SCNC: 28.2 MMOL/L (ref 22–29)
CREAT BLD-MCNC: 1.05 MG/DL (ref 0.57–1)
DEPRECATED RDW RBC AUTO: 40.1 FL (ref 37–54)
EOSINOPHIL # BLD AUTO: 0.07 10*3/MM3 (ref 0–0.4)
EOSINOPHIL NFR BLD AUTO: 1.2 % (ref 0.3–6.2)
ERYTHROCYTE [DISTWIDTH] IN BLOOD BY AUTOMATED COUNT: 12.4 % (ref 12.3–15.4)
GFR SERPL CREATININE-BSD FRML MDRD: 51 ML/MIN/1.73
GLOBULIN UR ELPH-MCNC: 3.3 GM/DL
GLUCOSE BLD-MCNC: 99 MG/DL (ref 65–99)
HCT VFR BLD AUTO: 36.8 % (ref 34–46.6)
HDLC SERPL-MCNC: 39 MG/DL (ref 40–60)
HGB BLD-MCNC: 11.9 G/DL (ref 12–15.9)
IMM GRANULOCYTES # BLD AUTO: 0.02 10*3/MM3 (ref 0–0.05)
IMM GRANULOCYTES NFR BLD AUTO: 0.4 % (ref 0–0.5)
LDLC SERPL CALC-MCNC: 84 MG/DL (ref 0–100)
LDLC/HDLC SERPL: 2.15 {RATIO}
LYMPHOCYTES # BLD AUTO: 0.91 10*3/MM3 (ref 0.7–3.1)
LYMPHOCYTES NFR BLD AUTO: 16.1 % (ref 19.6–45.3)
MCH RBC QN AUTO: 29 PG (ref 26.6–33)
MCHC RBC AUTO-ENTMCNC: 32.3 G/DL (ref 31.5–35.7)
MCV RBC AUTO: 89.8 FL (ref 79–97)
MONOCYTES # BLD AUTO: 0.46 10*3/MM3 (ref 0.1–0.9)
MONOCYTES NFR BLD AUTO: 8.2 % (ref 5–12)
NEUTROPHILS # BLD AUTO: 4.13 10*3/MM3 (ref 1.7–7)
NEUTROPHILS NFR BLD AUTO: 73.2 % (ref 42.7–76)
NRBC BLD AUTO-RTO: 0 /100 WBC (ref 0–0.2)
PLATELET # BLD AUTO: 333 10*3/MM3 (ref 140–450)
PMV BLD AUTO: 10.7 FL (ref 6–12)
POTASSIUM BLD-SCNC: 4.1 MMOL/L (ref 3.5–5.2)
PROT SERPL-MCNC: 7.3 G/DL (ref 6–8.5)
RBC # BLD AUTO: 4.1 10*6/MM3 (ref 3.77–5.28)
SODIUM BLD-SCNC: 143 MMOL/L (ref 136–145)
TRIGL SERPL-MCNC: 116 MG/DL (ref 0–150)
VIT B12 BLD-MCNC: 1514 PG/ML (ref 211–946)
VLDLC SERPL-MCNC: 23.2 MG/DL (ref 5–40)
WBC NRBC COR # BLD: 5.64 10*3/MM3 (ref 3.4–10.8)

## 2019-09-26 PROCEDURE — 80061 LIPID PANEL: CPT

## 2019-09-26 PROCEDURE — 85025 COMPLETE CBC W/AUTO DIFF WBC: CPT

## 2019-09-26 PROCEDURE — 82306 VITAMIN D 25 HYDROXY: CPT

## 2019-09-26 PROCEDURE — 82607 VITAMIN B-12: CPT

## 2019-09-26 PROCEDURE — 82550 ASSAY OF CK (CPK): CPT

## 2019-09-26 PROCEDURE — 80053 COMPREHEN METABOLIC PANEL: CPT

## 2019-09-26 PROCEDURE — 36415 COLL VENOUS BLD VENIPUNCTURE: CPT

## 2019-10-01 ENCOUNTER — OFFICE VISIT (OUTPATIENT)
Dept: CARDIOLOGY | Facility: CLINIC | Age: 74
End: 2019-10-01

## 2019-10-01 VITALS
WEIGHT: 137 LBS | HEIGHT: 66 IN | DIASTOLIC BLOOD PRESSURE: 76 MMHG | SYSTOLIC BLOOD PRESSURE: 122 MMHG | HEART RATE: 85 BPM | OXYGEN SATURATION: 97 % | BODY MASS INDEX: 22.02 KG/M2

## 2019-10-01 DIAGNOSIS — Z23 NEED FOR IMMUNIZATION AGAINST INFLUENZA: ICD-10-CM

## 2019-10-01 DIAGNOSIS — Z13.820 SCREENING FOR OSTEOPOROSIS: ICD-10-CM

## 2019-10-01 DIAGNOSIS — M89.9 DISORDER OF BONE, UNSPECIFIED: ICD-10-CM

## 2019-10-01 DIAGNOSIS — M15.3 OTHER SECONDARY OSTEOARTHRITIS OF MULTIPLE SITES: ICD-10-CM

## 2019-10-01 DIAGNOSIS — E78.2 MIXED HYPERLIPIDEMIA: Primary | ICD-10-CM

## 2019-10-01 DIAGNOSIS — Z13.820 ENCOUNTER FOR SCREENING FOR OSTEOPOROSIS: ICD-10-CM

## 2019-10-01 DIAGNOSIS — Z12.39 SCREENING FOR BREAST CANCER: ICD-10-CM

## 2019-10-01 PROCEDURE — 90653 IIV ADJUVANT VACCINE IM: CPT | Performed by: INTERNAL MEDICINE

## 2019-10-01 PROCEDURE — G0008 ADMIN INFLUENZA VIRUS VAC: HCPCS | Performed by: INTERNAL MEDICINE

## 2019-10-01 PROCEDURE — 99213 OFFICE O/P EST LOW 20 MIN: CPT | Performed by: INTERNAL MEDICINE

## 2019-10-01 RX ORDER — MONTELUKAST SODIUM 10 MG/1
10 TABLET ORAL NIGHTLY
Qty: 90 TABLET | Refills: 0 | Status: SHIPPED | OUTPATIENT
Start: 2019-10-01 | End: 2019-12-30

## 2019-10-01 RX ORDER — SIMVASTATIN 20 MG
20 TABLET ORAL NIGHTLY
Qty: 90 TABLET | Refills: 3 | Status: SHIPPED | OUTPATIENT
Start: 2019-10-01 | End: 2020-09-08

## 2019-10-01 NOTE — PROGRESS NOTES
subjective     Chief Complaint   Patient presents with   • Results     Labs   • Hyperlipidemia     History of Present Illness    Patient is 74 years old white female who is here for follow-up.  She is taking her medications regularly without any drug side effects.  Hyperlipidemia is very well controlled.  Patient is taking Zocor 20 mg daily.  Lab work will be reviewed.  GI symptoms are better with Protonix.  She is taking Protonix 40 mg daily.  Patient also is taking vitamin D B12 and baby aspirin daily.    Patient recently had a ureteral stone which need to be removed.  She is not doing much better.  Past Surgical History:   Procedure Laterality Date   • CARDIAC CATHETERIZATION  2014   • CARDIOVASCULAR STRESS TEST  2014   • COLONOSCOPY     • ECHO - CONVERTED  2014   • ENDOSCOPY  2015   • EXTRACORPOREAL SHOCK WAVE LITHOTRIPSY (ESWL) Left 9/13/2019    Procedure: EXTRACORPOREAL SHOCKWAVE LITHOTRIPSY;  Surgeon: Demarco Aldana MD;  Location: [facility];  Service: Urology   • HYSTERECTOMY  2002    benign   • LAPAROSCOPIC CHOLECYSTECTOMY  2001     Family History   Problem Relation Age of Onset   • Heart attack Mother    • Heart failure Mother    • Hypertension Mother    • Heart attack Father    • Hypertension Father    • Heart block Brother    • Heart block Sister    • Heart block Sister    • Heart attack Brother    • Heart block Brother    • Stroke Brother    • Breast cancer Neg Hx      Past Medical History:   Diagnosis Date   • Asthma    • Elevated cholesterol    • GERD (gastroesophageal reflux disease)    • Hyperlipidemia    • Kidney stones    • Osteoporosis    • Tongue irritation    • Weight loss      Patient Active Problem List   Diagnosis   • Hyperlipidemia   • Osteoporosis   • Ganglion cyst   • Gastroesophageal reflux disease without esophagitis   • Vitamin D deficiency   • Continuous leakage of urine   • Cough   • Environmental allergies   • URI, acute   • Chest pain in adult   • Xerostomia   • Chronic  fatigue   • Cutaneous candidiasis   • Renal calculus, left   • Screening for breast cancer   • Screening for osteoporosis       Social History     Tobacco Use   • Smoking status: Never Smoker   • Smokeless tobacco: Never Used   Substance Use Topics   • Alcohol use: No   • Drug use: No       Allergies   Allergen Reactions   • Latex        Current Outpatient Medications on File Prior to Visit   Medication Sig   • aspirin 81 MG EC tablet Take 81 mg by mouth daily.   • cholecalciferol (VITAMIN D3) 1000 UNITS tablet Take 1,000 Units by mouth daily.   • pantoprazole (PROTONIX) 40 MG EC tablet Take 1 tablet by mouth Daily.   • vitamin B-12 (CYANOCOBALAMIN) 1000 MCG tablet Take 1,000 mcg by mouth daily.   • [DISCONTINUED] montelukast (SINGULAIR) 10 MG tablet TAKE 1 TABLET BY MOUTH EVERY NIGHT   • [DISCONTINUED] simvastatin (ZOCOR) 20 MG tablet Take 1 tablet by mouth Every Night.   • Multiple Vitamin (MULTI VITAMIN DAILY PO) Take  by mouth.   • [DISCONTINUED] ondansetron ODT (ZOFRAN-ODT) 4 MG disintegrating tablet Take 1 tablet by mouth Every 6 (Six) Hours As Needed for Nausea.   • [DISCONTINUED] oxyCODONE-acetaminophen (PERCOCET)  MG per tablet Take 1 tablet by mouth Every 6 (Six) Hours As Needed for Moderate Pain .   • [DISCONTINUED] oxyCODONE-acetaminophen (PERCOCET)  MG per tablet Take 1 tablet by mouth Every 4 (Four) Hours As Needed for Moderate Pain.   • [DISCONTINUED] tamsulosin (FLOMAX) 0.4 MG capsule 24 hr capsule Take 1 capsule by mouth Daily.     No current facility-administered medications on file prior to visit.          The following portions of the patient's history were reviewed and updated as appropriate: allergies, current medications, past family history, past medical history, past social history, past surgical history and problem list.    Review of Systems   Constitution: Negative.   HENT: Negative.  Negative for congestion.    Eyes: Negative.    Cardiovascular: Negative.  Negative for chest  "pain, cyanosis, dyspnea on exertion, irregular heartbeat, leg swelling, near-syncope, orthopnea, palpitations, paroxysmal nocturnal dyspnea and syncope.   Respiratory: Negative.  Negative for shortness of breath.    Hematologic/Lymphatic: Negative.    Musculoskeletal: Negative.    Gastrointestinal: Negative.    Neurological: Negative.  Negative for headaches.          Objective:     /76 (BP Location: Left arm, Patient Position: Sitting, Cuff Size: Adult)   Pulse 85   Ht 167.6 cm (66\")   Wt 62.1 kg (137 lb)   SpO2 97%   BMI 22.11 kg/m²   Physical Exam   Constitutional: She appears well-developed and well-nourished. No distress.   HENT:   Head: Normocephalic and atraumatic.   Mouth/Throat: Oropharynx is clear and moist. No oropharyngeal exudate.   Eyes: Conjunctivae and EOM are normal. Pupils are equal, round, and reactive to light. No scleral icterus.   Neck: Normal range of motion. Neck supple. No JVD present. No tracheal deviation present. No thyromegaly present.   Cardiovascular: Normal rate, regular rhythm, normal heart sounds and intact distal pulses. PMI is not displaced. Exam reveals no gallop, no friction rub and no decreased pulses.   No murmur heard.  Pulses:       Carotid pulses are 3+ on the right side, and 3+ on the left side.       Radial pulses are 3+ on the right side, and 3+ on the left side.   Pulmonary/Chest: Effort normal and breath sounds normal. No respiratory distress. She has no wheezes. She has no rales. She exhibits no tenderness.   Abdominal: Soft. Bowel sounds are normal. She exhibits no distension, no abdominal bruit and no mass. There is no splenomegaly or hepatomegaly. There is no tenderness. There is no rebound and no guarding.   Musculoskeletal: Normal range of motion. She exhibits no edema, tenderness or deformity.   Lymphadenopathy:     She has no cervical adenopathy.   Neurological: She is alert. She has normal reflexes. No cranial nerve deficit. She exhibits normal " muscle tone. Coordination normal.   Skin: Skin is warm and dry. No rash noted. She is not diaphoretic. No erythema.   Psychiatric: She has a normal mood and affect. Her behavior is normal. Judgment and thought content normal.         Lab Review  Lab Results   Component Value Date     09/26/2019    K 4.1 09/26/2019     09/26/2019    BUN 15 09/26/2019    CREATININE 1.05 (H) 09/26/2019    GLUCOSE 99 09/26/2019    CALCIUM 9.9 09/26/2019    ALT 8 09/26/2019    ALKPHOS 95 09/26/2019    LABIL2 1.7 05/09/2016     Lab Results   Component Value Date    CKTOTAL 24 09/26/2019     Lab Results   Component Value Date    WBC 5.64 09/26/2019    HGB 11.9 (L) 09/26/2019    HCT 36.8 09/26/2019     09/26/2019     Lab Results   Component Value Date    INR 0.95 05/19/2014     No results found for: MG  Lab Results   Component Value Date    TSH 2.389 10/27/2016     No results found for: BNP  Lab Results   Component Value Date    CHLPL 195 05/09/2016    CHOL 146 09/26/2019    TRIG 116 09/26/2019    HDL 39 (L) 09/26/2019    VLDL 23.2 09/26/2019    LDLHDL 2.15 09/26/2019     Lab Results   Component Value Date    LDL 84 09/26/2019    LDL 74 04/05/2019       Procedures       I personally viewed and interpreted the patient's LAB data         Assessment:     1. Mixed hyperlipidemia    2. Screening for breast cancer    3. Screening for osteoporosis    4. Need for immunization against influenza    5. Encounter for screening for osteoporosis          Plan:     Patient has hyperlipidemia which is very well controlled.  Lab work 4 days ago shows cholesterol of 146 and triglyceride 116 with LDL of 84.    Routine health maintenance issues were discussed.  Flu shot was given.  DEXA scan cologuard and Shingrix was arranged.  Follow-up scheduled        No Follow-up on file.

## 2019-10-14 ENCOUNTER — HOSPITAL ENCOUNTER (OUTPATIENT)
Dept: BONE DENSITY | Facility: HOSPITAL | Age: 74
Discharge: HOME | End: 2019-10-14
Admitting: INTERNAL MEDICINE

## 2019-10-14 DIAGNOSIS — M89.9 DISORDER OF BONE, UNSPECIFIED: ICD-10-CM

## 2019-10-14 DIAGNOSIS — M15.3 OTHER SECONDARY OSTEOARTHRITIS OF MULTIPLE SITES: ICD-10-CM

## 2019-10-14 PROCEDURE — 77080 DXA BONE DENSITY AXIAL: CPT

## 2019-10-14 PROCEDURE — 77080 DXA BONE DENSITY AXIAL: CPT | Performed by: RADIOLOGY

## 2019-12-30 RX ORDER — MONTELUKAST SODIUM 10 MG/1
10 TABLET ORAL NIGHTLY
Qty: 90 TABLET | Refills: 0 | Status: SHIPPED | OUTPATIENT
Start: 2019-12-30 | End: 2020-02-27

## 2020-01-02 ENCOUNTER — OFFICE VISIT (OUTPATIENT)
Dept: CARDIOLOGY | Facility: CLINIC | Age: 75
End: 2020-01-02

## 2020-01-02 VITALS
SYSTOLIC BLOOD PRESSURE: 142 MMHG | BODY MASS INDEX: 22.98 KG/M2 | DIASTOLIC BLOOD PRESSURE: 68 MMHG | HEART RATE: 92 BPM | WEIGHT: 143 LBS | HEIGHT: 66 IN | OXYGEN SATURATION: 98 %

## 2020-01-02 DIAGNOSIS — R00.2 PALPITATIONS: ICD-10-CM

## 2020-01-02 DIAGNOSIS — I35.1 NONRHEUMATIC AORTIC VALVE INSUFFICIENCY: ICD-10-CM

## 2020-01-02 DIAGNOSIS — E55.9 VITAMIN D DEFICIENCY: ICD-10-CM

## 2020-01-02 DIAGNOSIS — E78.2 MIXED HYPERLIPIDEMIA: Primary | ICD-10-CM

## 2020-01-02 DIAGNOSIS — K21.9 GASTROESOPHAGEAL REFLUX DISEASE WITHOUT ESOPHAGITIS: ICD-10-CM

## 2020-01-02 DIAGNOSIS — R53.82 CHRONIC FATIGUE: ICD-10-CM

## 2020-01-02 PROCEDURE — 99213 OFFICE O/P EST LOW 20 MIN: CPT | Performed by: INTERNAL MEDICINE

## 2020-01-02 PROCEDURE — 93000 ELECTROCARDIOGRAM COMPLETE: CPT | Performed by: INTERNAL MEDICINE

## 2020-01-02 RX ORDER — TRIAMCINOLONE ACETONIDE 5 MG/G
CREAM TOPICAL 3 TIMES DAILY
Qty: 15 G | Refills: 3 | Status: SHIPPED | OUTPATIENT
Start: 2020-01-02 | End: 2020-06-30 | Stop reason: SDUPTHER

## 2020-01-02 NOTE — PROGRESS NOTES
subjective     Chief Complaint   Patient presents with   • Hyperlipidemia   • Osteoporosis     History of Present Illness  Patient is 74 years old white female who is here for follow-up of multiple chronic medical problems.  Patient states that her skin and hand itches.  She sees a skin doctor and has been taking triamcinolone cream.  This cream helps.  She needs refill.  Blood pressure is controlled patient is not taking any medications  She has hyperlipidemia on Zocor 20 mg daily without any drug side effects.  GERD symptoms are better with Protonix.  Patient also has vitamin D and B12 deficiency  Denies any chest pain palpitations or shortness of breath.    Past Surgical History:   Procedure Laterality Date   • CARDIAC CATHETERIZATION  2014   • CARDIOVASCULAR STRESS TEST  2014   • COLONOSCOPY     • ECHO - CONVERTED  2014   • ENDOSCOPY  2015   • EXTRACORPOREAL SHOCK WAVE LITHOTRIPSY (ESWL) Left 9/13/2019    Procedure: EXTRACORPOREAL SHOCKWAVE LITHOTRIPSY;  Surgeon: Demarco Aldana MD;  Location: [facility];  Service: Urology   • HYSTERECTOMY  2002    benign   • LAPAROSCOPIC CHOLECYSTECTOMY  2001     Family History   Problem Relation Age of Onset   • Heart attack Mother    • Heart failure Mother    • Hypertension Mother    • Heart attack Father    • Hypertension Father    • Heart block Brother    • Heart block Sister    • Heart block Sister    • Heart attack Brother    • Heart block Brother    • Stroke Brother    • Breast cancer Neg Hx      Past Medical History:   Diagnosis Date   • Asthma    • Elevated cholesterol    • GERD (gastroesophageal reflux disease)    • Hyperlipidemia    • Kidney stones    • Osteoporosis    • Tongue irritation    • Weight loss      Patient Active Problem List   Diagnosis   • Hyperlipidemia   • Osteoporosis   • Ganglion cyst   • Gastroesophageal reflux disease without esophagitis   • Vitamin D deficiency   • Continuous leakage of urine   • Cough   • Environmental allergies   • URI,  acute   • Chest pain in adult   • Xerostomia   • Chronic fatigue   • Cutaneous candidiasis   • Renal calculus, left   • Screening for breast cancer   • Screening for osteoporosis   • Palpitations   • Nonrheumatic aortic valve insufficiency and mild tricuspid regurgitation       Social History     Tobacco Use   • Smoking status: Never Smoker   • Smokeless tobacco: Never Used   Substance Use Topics   • Alcohol use: No   • Drug use: No       Allergies   Allergen Reactions   • Latex Other (See Comments)     Blisters on hands       Current Outpatient Medications on File Prior to Visit   Medication Sig   • aspirin 81 MG EC tablet Take 81 mg by mouth daily.   • cholecalciferol (VITAMIN D3) 1000 UNITS tablet Take 1,000 Units by mouth daily.   • montelukast (SINGULAIR) 10 MG tablet TAKE 1 TABLET BY MOUTH EVERY NIGHT   • Multiple Vitamin (MULTI VITAMIN DAILY PO) Take  by mouth.   • pantoprazole (PROTONIX) 40 MG EC tablet Take 1 tablet by mouth Daily.   • simvastatin (ZOCOR) 20 MG tablet Take 1 tablet by mouth Every Night.   • vitamin B-12 (CYANOCOBALAMIN) 1000 MCG tablet Take 1,000 mcg by mouth daily.     No current facility-administered medications on file prior to visit.          The following portions of the patient's history were reviewed and updated as appropriate: allergies, current medications, past family history, past medical history, past social history, past surgical history and problem list.    Review of Systems   Constitution: Negative.   HENT: Negative.  Negative for congestion.    Eyes: Negative.    Cardiovascular: Negative.  Negative for chest pain, cyanosis, dyspnea on exertion, irregular heartbeat, leg swelling, near-syncope, orthopnea, palpitations, paroxysmal nocturnal dyspnea and syncope.   Respiratory: Negative.  Negative for shortness of breath.    Hematologic/Lymphatic: Negative.    Skin: Positive for itching and rash.   Musculoskeletal: Negative.    Gastrointestinal: Negative.    Neurological:  "Negative.  Negative for headaches.          Objective:     /68 (BP Location: Left arm, Patient Position: Sitting, Cuff Size: Adult)   Pulse 92   Ht 167.6 cm (66\")   Wt 64.9 kg (143 lb)   SpO2 98%   BMI 23.08 kg/m²   Physical Exam   Constitutional: She appears well-developed and well-nourished. No distress.   HENT:   Head: Normocephalic and atraumatic.   Mouth/Throat: Oropharynx is clear and moist. No oropharyngeal exudate.   Eyes: Pupils are equal, round, and reactive to light. Conjunctivae and EOM are normal. No scleral icterus.   Neck: Normal range of motion. Neck supple. No JVD present. No tracheal deviation present. No thyromegaly present.   Cardiovascular: Normal rate, regular rhythm, normal heart sounds and intact distal pulses. PMI is not displaced. Exam reveals no gallop, no friction rub and no decreased pulses.   No murmur heard.  Pulses:       Carotid pulses are 3+ on the right side, and 3+ on the left side.       Radial pulses are 3+ on the right side, and 3+ on the left side.   Pulmonary/Chest: Effort normal and breath sounds normal. No respiratory distress. She has no wheezes. She has no rales. She exhibits no tenderness.   Abdominal: Soft. Bowel sounds are normal. She exhibits no distension, no abdominal bruit and no mass. There is no splenomegaly or hepatomegaly. There is no tenderness. There is no rebound and no guarding.   Musculoskeletal: Normal range of motion. She exhibits no edema, tenderness or deformity.   Lymphadenopathy:     She has no cervical adenopathy.   Neurological: She is alert. She has normal reflexes. No cranial nerve deficit. She exhibits normal muscle tone. Coordination normal.   Skin: Skin is warm and dry. No rash noted. She is not diaphoretic. No erythema.   Psychiatric: She has a normal mood and affect. Her behavior is normal. Judgment and thought content normal.         Lab Review  Lab Results   Component Value Date     09/26/2019    K 4.1 09/26/2019     " 09/26/2019    BUN 15 09/26/2019    CREATININE 1.05 (H) 09/26/2019    GLUCOSE 99 09/26/2019    CALCIUM 9.9 09/26/2019    ALT 8 09/26/2019    ALKPHOS 95 09/26/2019    LABIL2 1.7 05/09/2016     Lab Results   Component Value Date    CKTOTAL 24 09/26/2019     Lab Results   Component Value Date    WBC 5.64 09/26/2019    HGB 11.9 (L) 09/26/2019    HCT 36.8 09/26/2019     09/26/2019     Lab Results   Component Value Date    INR 0.95 05/19/2014     No results found for: MG  Lab Results   Component Value Date    TSH 2.389 10/27/2016     No results found for: BNP  Lab Results   Component Value Date    CHLPL 195 05/09/2016    CHOL 146 09/26/2019    TRIG 116 09/26/2019    HDL 39 (L) 09/26/2019    VLDL 23.2 09/26/2019    LDLHDL 2.15 09/26/2019     Lab Results   Component Value Date    LDL 84 09/26/2019    LDL 74 04/05/2019         ECG 12 Lead  Date/Time: 1/2/2020 12:32 PM  Performed by: Víctor Rodrigues MD  Authorized by: Víctor Rodrigues MD   Comparison: compared with previous ECG from 7/10/2018  Similar to previous ECG  Comparison to previous ECG: Heart rate is slightly faster and no PACs noted today  Rhythm: sinus rhythm  Rate: normal  BPM: 80  Conduction: conduction normal  ST Segments: ST segments normal  T Waves: T waves normal  QRS axis: normal  Other: no other findings    Clinical impression: normal ECG               I personally viewed and interpreted the patient's LAB data         Assessment:     1. Mixed hyperlipidemia    2. Gastroesophageal reflux disease without esophagitis    3. Vitamin D deficiency    4. Chronic fatigue    5. Palpitations    6. Nonrheumatic aortic valve insufficiency and mild tricuspid regurgitation          Plan:     Patient has hyperlipidemia which is very well controlled with Zocor.  Zocor was continued lab work scheduled for next visit.  Patient ran out of triamcinolone and wants a refill.  Refill was given.  Vitamin D and B12 continued.  Patient is taking Protonix.   Long-term use of Protonix was discouraged.  Patient has valvular heart disease with mild AI and tricuspid regurgitation.  Clinically patient is stable, valvular disease appears to be clinically insignificant.  Follow-up scheduled        No follow-ups on file.

## 2020-02-24 RX ORDER — PANTOPRAZOLE SODIUM 40 MG/1
40 TABLET, DELAYED RELEASE ORAL DAILY
Qty: 90 TABLET | Refills: 3 | Status: SHIPPED | OUTPATIENT
Start: 2020-02-24 | End: 2021-03-15

## 2020-02-27 RX ORDER — MONTELUKAST SODIUM 10 MG/1
10 TABLET ORAL NIGHTLY
Qty: 90 TABLET | Refills: 0 | Status: SHIPPED | OUTPATIENT
Start: 2020-02-27 | End: 2020-03-25

## 2020-03-25 RX ORDER — MONTELUKAST SODIUM 10 MG/1
10 TABLET ORAL NIGHTLY
Qty: 90 TABLET | Refills: 0 | Status: SHIPPED | OUTPATIENT
Start: 2020-03-25 | End: 2020-09-08

## 2020-04-01 ENCOUNTER — OFFICE VISIT (OUTPATIENT)
Dept: CARDIOLOGY | Facility: CLINIC | Age: 75
End: 2020-04-01

## 2020-04-01 VITALS — SYSTOLIC BLOOD PRESSURE: 127 MMHG | HEART RATE: 69 BPM | DIASTOLIC BLOOD PRESSURE: 67 MMHG

## 2020-04-01 DIAGNOSIS — E78.2 MIXED HYPERLIPIDEMIA: Primary | ICD-10-CM

## 2020-04-01 DIAGNOSIS — Z91.09 ENVIRONMENTAL ALLERGIES: ICD-10-CM

## 2020-04-01 DIAGNOSIS — I35.1 NONRHEUMATIC AORTIC VALVE INSUFFICIENCY: ICD-10-CM

## 2020-04-01 DIAGNOSIS — K21.9 GASTROESOPHAGEAL REFLUX DISEASE WITHOUT ESOPHAGITIS: ICD-10-CM

## 2020-04-01 PROCEDURE — 99442 PR PHYS/QHP TELEPHONE EVALUATION 11-20 MIN: CPT | Performed by: INTERNAL MEDICINE

## 2020-04-01 NOTE — PROGRESS NOTES
"You have chosen to receive care through a telephone visit today. Do you consent to use a telephone visit for your medical care today? {YES NO:36765::\"Yes\"}  "

## 2020-04-01 NOTE — PROGRESS NOTES
This visit has been rescheduled as a phone visit to comply with patient safety concerns in accordance with CDC recommendations. Total time of discussion was 15 minutes.  subjective     Chief Complaint   Patient presents with   • Hyperlipidemia     You have chosen to receive care through a telephone visit today. Do you consent to use a telephone visit for your medical care today? Yes    This visit has been rescheduled as a phone visit to comply with patient safety concerns in accordance with CDC recommendations. Total time of discussion was 15 minutes.      Patient is 74 years old white female who is being seen as audio visit.  Patient is doing fairly well she is taking her medications regularly.  Patient has multiple chronic medical problems.  Hyperlipidemia is being treated with Zocor 20 mg daily.  No drug side effects noted.  Patient also has GERD symptoms and is taking pantoprazole 40 mg daily.  Multiple environmental allergies on Singulair 10 daily.    Patient did not get lab work done as ordered because of coronavirus epidemic patient did not leave the house.  She has no specific complaints and is taking medications regularly and is trying to diet also.  Patient has aortic insufficiency clinically no evidence of heart failure.    Past Surgical History:   Procedure Laterality Date   • CARDIAC CATHETERIZATION  2014   • CARDIOVASCULAR STRESS TEST  2014   • COLONOSCOPY     • ECHO - CONVERTED  2014   • ENDOSCOPY  2015   • EXTRACORPOREAL SHOCK WAVE LITHOTRIPSY (ESWL) Left 9/13/2019    Procedure: EXTRACORPOREAL SHOCKWAVE LITHOTRIPSY;  Surgeon: Demarco Aldana MD;  Location: [facility];  Service: Urology   • HYSTERECTOMY  2002    benign   • LAPAROSCOPIC CHOLECYSTECTOMY  2001     Family History   Problem Relation Age of Onset   • Heart attack Mother    • Heart failure Mother    • Hypertension Mother    • Heart attack Father    • Hypertension Father    • Heart block Brother    • Heart block Sister    • Heart block  Sister    • Heart attack Brother    • Heart block Brother    • Stroke Brother    • Breast cancer Neg Hx      Past Medical History:   Diagnosis Date   • Asthma    • Elevated cholesterol    • GERD (gastroesophageal reflux disease)    • Hyperlipidemia    • Kidney stones    • Osteoporosis    • Tongue irritation    • Weight loss      Patient Active Problem List   Diagnosis   • Hyperlipidemia   • Osteoporosis   • Ganglion cyst   • Gastroesophageal reflux disease without esophagitis   • Vitamin D deficiency   • Continuous leakage of urine   • Cough   • Environmental allergies   • URI, acute   • Chest pain in adult   • Xerostomia   • Chronic fatigue   • Cutaneous candidiasis   • Renal calculus, left   • Screening for breast cancer   • Screening for osteoporosis   • Palpitations   • Nonrheumatic aortic valve insufficiency and mild tricuspid regurgitation       Social History     Tobacco Use   • Smoking status: Never Smoker   • Smokeless tobacco: Never Used   Substance Use Topics   • Alcohol use: No   • Drug use: No       Allergies   Allergen Reactions   • Latex Other (See Comments)     Blisters on hands       Current Outpatient Medications on File Prior to Visit   Medication Sig   • aspirin 81 MG EC tablet Take 81 mg by mouth daily.   • cholecalciferol (VITAMIN D3) 1000 UNITS tablet Take 1,000 Units by mouth daily.   • montelukast (SINGULAIR) 10 MG tablet TAKE 1 TABLET BY MOUTH EVERY NIGHT   • Multiple Vitamin (MULTI VITAMIN DAILY PO) Take  by mouth.   • pantoprazole (PROTONIX) 40 MG EC tablet TAKE 1 TABLET BY MOUTH DAILY   • simvastatin (ZOCOR) 20 MG tablet Take 1 tablet by mouth Every Night.   • triamcinolone (KENALOG) 0.5 % cream Apply  topically to the appropriate area as directed 3 (Three) Times a Day.   • vitamin B-12 (CYANOCOBALAMIN) 1000 MCG tablet Take 1,000 mcg by mouth daily.     No current facility-administered medications on file prior to visit.          The following portions of the patient's history were  reviewed and updated as appropriate: allergies, current medications, past family history, past medical history, past social history, past surgical history and problem list.    Review of Systems   Constitution: Negative.   HENT: Negative.  Negative for congestion.    Eyes: Negative.    Cardiovascular: Negative.  Negative for chest pain, cyanosis, dyspnea on exertion, irregular heartbeat, leg swelling, near-syncope, orthopnea, palpitations, paroxysmal nocturnal dyspnea and syncope.   Respiratory: Negative.  Negative for shortness of breath.    Hematologic/Lymphatic: Negative.    Musculoskeletal: Negative.    Gastrointestinal: Negative.    Neurological: Negative.  Negative for headaches.          Objective:     /67 Comment: verbally given by patient at home  Pulse 69   Physical Exam   Constitutional:   Audio visit only         Lab Review  No lab work for this visit however lab work requested.  Procedures       I personally viewed and interpreted the patient's LAB data         Assessment:     1. Mixed hyperlipidemia    2. Environmental allergies    3. Gastroesophageal reflux disease without esophagitis    4. Nonrheumatic aortic valve insufficiency and mild tricuspid regurgitation          Plan:     Patient is asymptomatic and is doing very well.  She was advised to continue simvastatin 20 mg daily.  Healthy lifestyle and dietary restrictions were again emphasized.  She will continue Protonix 40 mg daily.  GERD symptoms are much better.  Allergies are controlled with Singulair which was continued.  Patient has aortic insufficiency but clinically no evidence of decompensation.  Follow-up scheduled  Lab work next visit        No follow-ups on file.

## 2020-06-25 ENCOUNTER — LAB (OUTPATIENT)
Dept: LAB | Facility: HOSPITAL | Age: 75
End: 2020-06-25

## 2020-06-25 DIAGNOSIS — E78.2 MIXED HYPERLIPIDEMIA: ICD-10-CM

## 2020-06-25 DIAGNOSIS — E55.9 VITAMIN D DEFICIENCY: ICD-10-CM

## 2020-06-25 DIAGNOSIS — R53.82 CHRONIC FATIGUE: ICD-10-CM

## 2020-06-25 LAB
25(OH)D3 SERPL-MCNC: 34.4 NG/ML (ref 30–100)
ALBUMIN SERPL-MCNC: 4.3 G/DL (ref 3.5–5.2)
ALBUMIN/GLOB SERPL: 2 G/DL
ALP SERPL-CCNC: 71 U/L (ref 39–117)
ALT SERPL W P-5'-P-CCNC: 19 U/L (ref 1–33)
ANION GAP SERPL CALCULATED.3IONS-SCNC: 8.7 MMOL/L (ref 5–15)
AST SERPL-CCNC: 17 U/L (ref 1–32)
BASOPHILS # BLD AUTO: 0.03 10*3/MM3 (ref 0–0.2)
BASOPHILS NFR BLD AUTO: 0.8 % (ref 0–1.5)
BILIRUB SERPL-MCNC: 0.6 MG/DL (ref 0.2–1.2)
BUN BLD-MCNC: 20 MG/DL (ref 8–23)
BUN/CREAT SERPL: 19.8 (ref 7–25)
CALCIUM SPEC-SCNC: 9.5 MG/DL (ref 8.6–10.5)
CHLORIDE SERPL-SCNC: 106 MMOL/L (ref 98–107)
CHOLEST SERPL-MCNC: 158 MG/DL (ref 0–200)
CK SERPL-CCNC: 97 U/L (ref 20–180)
CO2 SERPL-SCNC: 26.3 MMOL/L (ref 22–29)
CREAT BLD-MCNC: 1.01 MG/DL (ref 0.57–1)
DEPRECATED RDW RBC AUTO: 41.2 FL (ref 37–54)
EOSINOPHIL # BLD AUTO: 0.29 10*3/MM3 (ref 0–0.4)
EOSINOPHIL NFR BLD AUTO: 8.2 % (ref 0.3–6.2)
ERYTHROCYTE [DISTWIDTH] IN BLOOD BY AUTOMATED COUNT: 12.2 % (ref 12.3–15.4)
GFR SERPL CREATININE-BSD FRML MDRD: 53 ML/MIN/1.73
GLOBULIN UR ELPH-MCNC: 2.1 GM/DL
GLUCOSE BLD-MCNC: 85 MG/DL (ref 65–99)
HCT VFR BLD AUTO: 40.2 % (ref 34–46.6)
HDLC SERPL-MCNC: 53 MG/DL (ref 40–60)
HGB BLD-MCNC: 13.4 G/DL (ref 12–15.9)
IMM GRANULOCYTES # BLD AUTO: 0.01 10*3/MM3 (ref 0–0.05)
IMM GRANULOCYTES NFR BLD AUTO: 0.3 % (ref 0–0.5)
LDLC SERPL CALC-MCNC: 81 MG/DL (ref 0–100)
LDLC/HDLC SERPL: 1.52 {RATIO}
LYMPHOCYTES # BLD AUTO: 1.39 10*3/MM3 (ref 0.7–3.1)
LYMPHOCYTES NFR BLD AUTO: 39.2 % (ref 19.6–45.3)
MCH RBC QN AUTO: 30.7 PG (ref 26.6–33)
MCHC RBC AUTO-ENTMCNC: 33.3 G/DL (ref 31.5–35.7)
MCV RBC AUTO: 92.2 FL (ref 79–97)
MONOCYTES # BLD AUTO: 0.36 10*3/MM3 (ref 0.1–0.9)
MONOCYTES NFR BLD AUTO: 10.1 % (ref 5–12)
NEUTROPHILS # BLD AUTO: 1.47 10*3/MM3 (ref 1.7–7)
NEUTROPHILS NFR BLD AUTO: 41.4 % (ref 42.7–76)
NRBC BLD AUTO-RTO: 0 /100 WBC (ref 0–0.2)
PLATELET # BLD AUTO: 217 10*3/MM3 (ref 140–450)
PMV BLD AUTO: 11.4 FL (ref 6–12)
POTASSIUM BLD-SCNC: 4.3 MMOL/L (ref 3.5–5.2)
PROT SERPL-MCNC: 6.4 G/DL (ref 6–8.5)
RBC # BLD AUTO: 4.36 10*6/MM3 (ref 3.77–5.28)
SODIUM BLD-SCNC: 141 MMOL/L (ref 136–145)
TRIGL SERPL-MCNC: 121 MG/DL (ref 0–150)
VIT B12 BLD-MCNC: 1485 PG/ML (ref 211–946)
VLDLC SERPL-MCNC: 24.2 MG/DL (ref 5–40)
WBC NRBC COR # BLD: 3.55 10*3/MM3 (ref 3.4–10.8)

## 2020-06-25 PROCEDURE — 82550 ASSAY OF CK (CPK): CPT

## 2020-06-25 PROCEDURE — 36415 COLL VENOUS BLD VENIPUNCTURE: CPT

## 2020-06-25 PROCEDURE — 80053 COMPREHEN METABOLIC PANEL: CPT

## 2020-06-25 PROCEDURE — 82607 VITAMIN B-12: CPT

## 2020-06-25 PROCEDURE — 85025 COMPLETE CBC W/AUTO DIFF WBC: CPT

## 2020-06-25 PROCEDURE — 80061 LIPID PANEL: CPT

## 2020-06-25 PROCEDURE — 82306 VITAMIN D 25 HYDROXY: CPT

## 2020-06-30 ENCOUNTER — OFFICE VISIT (OUTPATIENT)
Dept: CARDIOLOGY | Facility: CLINIC | Age: 75
End: 2020-06-30

## 2020-06-30 VITALS
HEART RATE: 75 BPM | BODY MASS INDEX: 23.95 KG/M2 | TEMPERATURE: 99.1 F | DIASTOLIC BLOOD PRESSURE: 84 MMHG | WEIGHT: 149 LBS | HEIGHT: 66 IN | SYSTOLIC BLOOD PRESSURE: 136 MMHG | OXYGEN SATURATION: 99 %

## 2020-06-30 DIAGNOSIS — K21.9 GASTROESOPHAGEAL REFLUX DISEASE WITHOUT ESOPHAGITIS: ICD-10-CM

## 2020-06-30 DIAGNOSIS — I35.1 NONRHEUMATIC AORTIC VALVE INSUFFICIENCY: Primary | ICD-10-CM

## 2020-06-30 DIAGNOSIS — E55.9 VITAMIN D DEFICIENCY: ICD-10-CM

## 2020-06-30 DIAGNOSIS — E78.2 MIXED HYPERLIPIDEMIA: ICD-10-CM

## 2020-06-30 DIAGNOSIS — Z91.09 ENVIRONMENTAL ALLERGIES: ICD-10-CM

## 2020-06-30 PROCEDURE — 99214 OFFICE O/P EST MOD 30 MIN: CPT | Performed by: INTERNAL MEDICINE

## 2020-06-30 RX ORDER — TRIAMCINOLONE ACETONIDE 5 MG/G
CREAM TOPICAL 3 TIMES DAILY
Qty: 454 G | Refills: 1 | Status: SHIPPED | OUTPATIENT
Start: 2020-06-30 | End: 2021-01-08

## 2020-06-30 NOTE — PROGRESS NOTES
subjective     Chief Complaint   Patient presents with   • Results     labs   • Hyperlipidemia     Follow up   • Med Refill     pending     History of Present Illness  Patient is 75 years old white female who is here for follow-up.  She seems to be doing well.  She had lab work drawn which will be reviewed.  Patient has hyperlipidemia and has been taking Zocor 20 mg daily without any drug side effects.  She also has multiple environmental allergies and is taking Singulair which was continued.  GERD symptoms are better with Protonix which she is taking regularly.  She ran out of Kenalog cream and needs refill.  She also has vitamin D and B12 deficiency lab work will be discussed.  She also has aortic regurgitation clinically stable    Past Surgical History:   Procedure Laterality Date   • CARDIAC CATHETERIZATION  2014   • CARDIOVASCULAR STRESS TEST  2014   • COLONOSCOPY     • ECHO - CONVERTED  2014   • ENDOSCOPY  2015   • EXTRACORPOREAL SHOCK WAVE LITHOTRIPSY (ESWL) Left 9/13/2019    Procedure: EXTRACORPOREAL SHOCKWAVE LITHOTRIPSY;  Surgeon: Demarco Aldana MD;  Location: [facility];  Service: Urology   • HYSTERECTOMY  2002    benign   • LAPAROSCOPIC CHOLECYSTECTOMY  2001     Family History   Problem Relation Age of Onset   • Heart attack Mother    • Heart failure Mother    • Hypertension Mother    • Heart attack Father    • Hypertension Father    • Heart block Brother    • Heart block Sister    • Heart block Sister    • Heart attack Brother    • Heart block Brother    • Stroke Brother    • Breast cancer Neg Hx      Past Medical History:   Diagnosis Date   • Asthma    • Elevated cholesterol    • GERD (gastroesophageal reflux disease)    • Hyperlipidemia    • Kidney stones    • Osteoporosis    • Tongue irritation    • Weight loss      Patient Active Problem List   Diagnosis   • Hyperlipidemia   • Osteoporosis   • Ganglion cyst   • Gastroesophageal reflux disease without esophagitis   • Vitamin D deficiency   •  Continuous leakage of urine   • Cough   • Environmental allergies   • URI, acute   • Chest pain in adult   • Xerostomia   • Chronic fatigue   • Cutaneous candidiasis   • Renal calculus, left   • Screening for breast cancer   • Screening for osteoporosis   • Palpitations   • Nonrheumatic aortic valve insufficiency and mild tricuspid regurgitation       Social History     Tobacco Use   • Smoking status: Never Smoker   • Smokeless tobacco: Never Used   Substance Use Topics   • Alcohol use: No   • Drug use: No       Allergies   Allergen Reactions   • Latex Other (See Comments)     Blisters on hands       Current Outpatient Medications on File Prior to Visit   Medication Sig   • aspirin 81 MG EC tablet Take 81 mg by mouth daily.   • cholecalciferol (VITAMIN D3) 1000 UNITS tablet Take 1,000 Units by mouth daily.   • montelukast (SINGULAIR) 10 MG tablet TAKE 1 TABLET BY MOUTH EVERY NIGHT   • Multiple Vitamin (MULTI VITAMIN DAILY PO) Take  by mouth.   • pantoprazole (PROTONIX) 40 MG EC tablet TAKE 1 TABLET BY MOUTH DAILY   • simvastatin (ZOCOR) 20 MG tablet Take 1 tablet by mouth Every Night.   • vitamin B-12 (CYANOCOBALAMIN) 1000 MCG tablet Take 1,000 mcg by mouth daily.   • [DISCONTINUED] triamcinolone (KENALOG) 0.5 % cream Apply  topically to the appropriate area as directed 3 (Three) Times a Day.     No current facility-administered medications on file prior to visit.          The following portions of the patient's history were reviewed and updated as appropriate: allergies, current medications, past family history, past medical history, past social history, past surgical history and problem list.    Review of Systems   Constitution: Negative.   HENT: Negative.  Negative for congestion.    Eyes: Negative.    Cardiovascular: Negative.  Negative for chest pain, cyanosis, dyspnea on exertion, irregular heartbeat, leg swelling, near-syncope, orthopnea, palpitations, paroxysmal nocturnal dyspnea and syncope.   Respiratory:  "Negative.  Negative for shortness of breath.    Hematologic/Lymphatic: Negative.    Musculoskeletal: Negative.    Gastrointestinal: Negative.    Neurological: Negative.  Negative for headaches.   Allergic/Immunologic: Positive for environmental allergies.          Objective:     /84 (BP Location: Left arm, Patient Position: Sitting, Cuff Size: Adult)   Pulse 75   Temp 99.1 °F (37.3 °C)   Ht 167.6 cm (66\")   Wt 67.6 kg (149 lb)   SpO2 99%   BMI 24.05 kg/m²   Physical Exam   Constitutional: She appears well-developed and well-nourished. No distress.   HENT:   Head: Normocephalic and atraumatic.   Mouth/Throat: Oropharynx is clear and moist. No oropharyngeal exudate.   Eyes: Pupils are equal, round, and reactive to light. Conjunctivae and EOM are normal. No scleral icterus.   Neck: Normal range of motion. Neck supple. No JVD present. No tracheal deviation present. No thyromegaly present.   Cardiovascular: Normal rate, regular rhythm, normal heart sounds and intact distal pulses. PMI is not displaced. Exam reveals no gallop, no friction rub and no decreased pulses.   No murmur heard.  Pulses:       Carotid pulses are 3+ on the right side, and 3+ on the left side.       Radial pulses are 3+ on the right side, and 3+ on the left side.   Pulmonary/Chest: Effort normal and breath sounds normal. No respiratory distress. She has no wheezes. She has no rales. She exhibits no tenderness.   Abdominal: Soft. Bowel sounds are normal. She exhibits no distension, no abdominal bruit and no mass. There is no splenomegaly or hepatomegaly. There is no tenderness. There is no rebound and no guarding.   Musculoskeletal: Normal range of motion. She exhibits no edema, tenderness or deformity.   Lymphadenopathy:     She has no cervical adenopathy.   Neurological: She is alert. She has normal reflexes. No cranial nerve deficit. She exhibits normal muscle tone. Coordination normal.   Skin: Skin is warm and dry. No rash noted. She " is not diaphoretic. No erythema.   Psychiatric: She has a normal mood and affect. Her behavior is normal. Judgment and thought content normal.         Lab Review  Lab Results   Component Value Date     06/25/2020    K 4.3 06/25/2020     06/25/2020    BUN 20 06/25/2020    CREATININE 1.01 (H) 06/25/2020    GLUCOSE 85 06/25/2020    CALCIUM 9.5 06/25/2020    ALT 19 06/25/2020    ALKPHOS 71 06/25/2020    LABIL2 1.7 05/09/2016     Lab Results   Component Value Date    CKTOTAL 97 06/25/2020     Lab Results   Component Value Date    WBC 3.55 06/25/2020    HGB 13.4 06/25/2020    HCT 40.2 06/25/2020     06/25/2020     Lab Results   Component Value Date    INR 0.95 05/19/2014     No results found for: MG  Lab Results   Component Value Date    TSH 2.389 10/27/2016     No results found for: BNP  Lab Results   Component Value Date    CHLPL 195 05/09/2016    CHOL 158 06/25/2020    TRIG 121 06/25/2020    HDL 53 06/25/2020    VLDL 24.2 06/25/2020    LDLHDL 1.52 06/25/2020     Lab Results   Component Value Date    LDL 81 06/25/2020    LDL 84 09/26/2019       Procedures       I personally viewed and interpreted the patient's LAB data         Assessment:     1. Nonrheumatic aortic valve insufficiency and mild tricuspid regurgitation    2. Mixed hyperlipidemia    3. Vitamin D deficiency    4. Gastroesophageal reflux disease without esophagitis    5. Environmental allergies          Plan:     Patient seems be doing very well, lab work reviewed lipid panel is normal with LDL of 81 patient was advised to continue Zocor 20 mg daily.  Environmental allergies are controlled Singulair was continued.  GERD symptoms are also controlled Protonix was continued.  Vitamin D and B12 was also continued  Lab work discussed with the patient CBC CMP and lipid panel all normal.  Follow-up scheduled      No follow-ups on file.

## 2020-09-08 RX ORDER — MONTELUKAST SODIUM 10 MG/1
10 TABLET ORAL NIGHTLY
Qty: 90 TABLET | Refills: 0 | Status: SHIPPED | OUTPATIENT
Start: 2020-09-08 | End: 2020-12-07

## 2020-09-08 RX ORDER — SIMVASTATIN 20 MG
20 TABLET ORAL NIGHTLY
Qty: 90 TABLET | Refills: 3 | Status: SHIPPED | OUTPATIENT
Start: 2020-09-08 | End: 2021-09-10

## 2020-09-24 ENCOUNTER — OFFICE VISIT (OUTPATIENT)
Dept: CARDIOLOGY | Facility: CLINIC | Age: 75
End: 2020-09-24

## 2020-09-24 VITALS
HEART RATE: 73 BPM | OXYGEN SATURATION: 99 % | BODY MASS INDEX: 24.59 KG/M2 | TEMPERATURE: 97.2 F | SYSTOLIC BLOOD PRESSURE: 148 MMHG | WEIGHT: 153 LBS | HEIGHT: 66 IN | DIASTOLIC BLOOD PRESSURE: 63 MMHG

## 2020-09-24 DIAGNOSIS — E55.9 VITAMIN D DEFICIENCY: ICD-10-CM

## 2020-09-24 DIAGNOSIS — Z23 NEED FOR IMMUNIZATION AGAINST INFLUENZA: ICD-10-CM

## 2020-09-24 DIAGNOSIS — E78.2 MIXED HYPERLIPIDEMIA: ICD-10-CM

## 2020-09-24 DIAGNOSIS — I10 ESSENTIAL HYPERTENSION: ICD-10-CM

## 2020-09-24 DIAGNOSIS — I35.1 NONRHEUMATIC AORTIC VALVE INSUFFICIENCY: ICD-10-CM

## 2020-09-24 DIAGNOSIS — Z23 NEED FOR PROPHYLACTIC VACCINATION AGAINST DIPHTHERIA AND TETANUS: ICD-10-CM

## 2020-09-24 DIAGNOSIS — L23.7 ALLERGIC CONTACT DERMATITIS DUE TO PLANTS, EXCEPT FOOD: Primary | ICD-10-CM

## 2020-09-24 DIAGNOSIS — Z23 NEED FOR PROPHYLACTIC VACCINATION AND INOCULATION AGAINST CHOLERA ALONE: ICD-10-CM

## 2020-09-24 DIAGNOSIS — K21.9 GASTROESOPHAGEAL REFLUX DISEASE WITHOUT ESOPHAGITIS: ICD-10-CM

## 2020-09-24 PROCEDURE — 90694 VACC AIIV4 NO PRSRV 0.5ML IM: CPT | Performed by: INTERNAL MEDICINE

## 2020-09-24 PROCEDURE — 96372 THER/PROPH/DIAG INJ SC/IM: CPT | Performed by: INTERNAL MEDICINE

## 2020-09-24 PROCEDURE — 99214 OFFICE O/P EST MOD 30 MIN: CPT | Performed by: INTERNAL MEDICINE

## 2020-09-24 PROCEDURE — G0008 ADMIN INFLUENZA VIRUS VAC: HCPCS | Performed by: INTERNAL MEDICINE

## 2020-09-24 RX ORDER — METHYLPREDNISOLONE 4 MG/1
TABLET ORAL
Qty: 21 TABLET | Refills: 0 | Status: SHIPPED | OUTPATIENT
Start: 2020-09-24 | End: 2021-01-08

## 2020-09-24 RX ORDER — TRIAMCINOLONE ACETONIDE 40 MG/ML
40 INJECTION, SUSPENSION INTRA-ARTICULAR; INTRAMUSCULAR ONCE
Status: COMPLETED | OUTPATIENT
Start: 2020-09-24 | End: 2020-09-24

## 2020-09-24 RX ADMIN — TRIAMCINOLONE ACETONIDE 40 MG: 40 INJECTION, SUSPENSION INTRA-ARTICULAR; INTRAMUSCULAR at 11:38

## 2020-09-24 NOTE — PROGRESS NOTES
subjective     Chief Complaint   Patient presents with   • Rash     left arm started last wknd working in the yard / itches and burns   • Hypertension   • Palpitations     Follow up, pt denies any symptoms     History of Present Illness  Patient is 75 years old white female who states that she developed itching and rash around her left elbow.  She is working in the yard and she thinks that she may have come in contact with the poison.  It started about 3 days ago.  He has been using calamine lotion without much benefit she is still itching a lot blisters have subsided.  This is only one spot which is to inches in diameter with multiple blisters    Blood pressure is running very good at home but it is mildly elevated today.  She is taking her medications regularly she does not take any blood pressure medication.  She has hyperlipidemia and has been taking Zocor.  GI symptoms are under control with Protonix.  She has environmental allergies and takes Singulair and Kenalog cream.    Past Surgical History:   Procedure Laterality Date   • CARDIAC CATHETERIZATION  2014   • CARDIOVASCULAR STRESS TEST  2014   • COLONOSCOPY     • ECHO - CONVERTED  2014   • ENDOSCOPY  2015   • EXTRACORPOREAL SHOCK WAVE LITHOTRIPSY (ESWL) Left 9/13/2019    Procedure: EXTRACORPOREAL SHOCKWAVE LITHOTRIPSY;  Surgeon: Demarco Aldana MD;  Location: [facility];  Service: Urology   • HYSTERECTOMY  2002    benign   • LAPAROSCOPIC CHOLECYSTECTOMY  2001     Family History   Problem Relation Age of Onset   • Heart attack Mother    • Heart failure Mother    • Hypertension Mother    • Heart attack Father    • Hypertension Father    • Heart block Brother    • Heart block Sister    • Heart block Sister    • Heart attack Brother    • Heart block Brother    • Stroke Brother    • Breast cancer Neg Hx      Past Medical History:   Diagnosis Date   • Asthma    • Elevated cholesterol    • Essential hypertension 9/24/2020   • GERD (gastroesophageal reflux  disease)    • Hyperlipidemia    • Kidney stones    • Osteoporosis    • Tongue irritation    • Weight loss      Patient Active Problem List   Diagnosis   • Hyperlipidemia   • Osteoporosis   • Ganglion cyst   • Gastroesophageal reflux disease without esophagitis   • Vitamin D deficiency   • Continuous leakage of urine   • Cough   • Environmental allergies   • URI, acute   • Chest pain in adult   • Xerostomia   • Chronic fatigue   • Cutaneous candidiasis   • Renal calculus, left   • Screening for breast cancer   • Screening for osteoporosis   • Palpitations   • Nonrheumatic aortic valve insufficiency and mild tricuspid regurgitation   • Allergic contact dermatitis due to plants, except food   • Essential hypertension       Social History     Tobacco Use   • Smoking status: Never Smoker   • Smokeless tobacco: Never Used   Substance Use Topics   • Alcohol use: No   • Drug use: No       Allergies   Allergen Reactions   • Latex Other (See Comments)     Blisters on hands       Current Outpatient Medications on File Prior to Visit   Medication Sig   • aspirin 81 MG EC tablet Take 81 mg by mouth daily.   • cholecalciferol (VITAMIN D3) 1000 UNITS tablet Take 1,000 Units by mouth daily.   • montelukast (SINGULAIR) 10 MG tablet TAKE 1 TABLET BY MOUTH EVERY NIGHT   • Multiple Vitamin (MULTI VITAMIN DAILY PO) Take  by mouth.   • pantoprazole (PROTONIX) 40 MG EC tablet TAKE 1 TABLET BY MOUTH DAILY   • simvastatin (ZOCOR) 20 MG tablet TAKE 1 TABLET BY MOUTH EVERY NIGHT   • triamcinolone (KENALOG) 0.5 % cream Apply  topically to the appropriate area as directed 3 (Three) Times a Day.   • vitamin B-12 (CYANOCOBALAMIN) 1000 MCG tablet Take 1,000 mcg by mouth daily.     No current facility-administered medications on file prior to visit.          The following portions of the patient's history were reviewed and updated as appropriate: allergies, current medications, past family history, past medical history, past social history, past  "surgical history and problem list.    Review of Systems   Constitution: Negative.   HENT: Negative.  Negative for congestion.    Eyes: Negative.    Cardiovascular: Negative.  Negative for chest pain, cyanosis, dyspnea on exertion, irregular heartbeat, leg swelling, near-syncope, orthopnea, palpitations, paroxysmal nocturnal dyspnea and syncope.   Respiratory: Negative.  Negative for shortness of breath.    Hematologic/Lymphatic: Negative.    Skin: Positive for itching and rash.        Left arm above the elbow.  No other spots on the arm or neck .   Musculoskeletal: Negative.    Gastrointestinal: Negative.    Neurological: Negative.  Negative for headaches.          Objective:     /63   Pulse 73   Temp 97.2 °F (36.2 °C)   Ht 167.6 cm (66\")   Wt 69.4 kg (153 lb)   SpO2 99%   BMI 24.69 kg/m²   Vitals signs and nursing note reviewed.   Constitutional:       General: Not in acute distress.     Appearance: Healthy appearance. Well-developed and not in distress. Not diaphoretic.   Eyes:      General: No scleral icterus.     Conjunctiva/sclera: Conjunctivae normal.      Pupils: Pupils are equal, round, and reactive to light.   HENT:      Head: Normocephalic and atraumatic.   Neck:      Musculoskeletal: Normal range of motion and neck supple.      Thyroid: Thyroid normal. No thyromegaly.      Vascular: No JVD. JVD normal.      Trachea: No tracheal deviation.      Lymphadenopathy: No cervical adenopathy.   Pulmonary:      Effort: Pulmonary effort is normal. No respiratory distress.      Breath sounds: Normal breath sounds and air entry.   Chest:      Chest wall: Not tender to palpatation.   Cardiovascular:      PMI at left midclavicular line. Normal rate. Regular rhythm.      No gallop.   Pulses:     Intact distal pulses. No decreased pulses.   Abdominal:      General: Bowel sounds are normal. There is no distension or abdominal bruit.      Palpations: Abdomen is soft. There is no hepatomegaly, splenomegaly or " abdominal mass.      Tenderness: There is no abdominal tenderness. There is no guarding or rebound.   Skin:     General: Skin is warm and dry. There is no cyanosis.      Coloration: Skin is not jaundiced or pale.      Findings: Rash present. No erythema.   Neurological:      Mental Status: Alert, oriented to person, place, and time and oriented to person, place and time.   Psychiatric:         Attention and Perception: Attention normal.         Mood and Affect: Mood and affect normal.         Speech: Speech normal.         Behavior: Behavior is cooperative.           Lab Review  Lab Results   Component Value Date     06/25/2020    K 4.3 06/25/2020     06/25/2020    BUN 20 06/25/2020    CREATININE 1.01 (H) 06/25/2020    GLUCOSE 85 06/25/2020    CALCIUM 9.5 06/25/2020    ALT 19 06/25/2020    ALKPHOS 71 06/25/2020    LABIL2 1.7 05/09/2016     Lab Results   Component Value Date    CKTOTAL 97 06/25/2020     Lab Results   Component Value Date    WBC 3.55 06/25/2020    HGB 13.4 06/25/2020    HCT 40.2 06/25/2020     06/25/2020     Lab Results   Component Value Date    INR 0.95 05/19/2014     No results found for: MG  Lab Results   Component Value Date    TSH 2.389 10/27/2016     No results found for: BNP  Lab Results   Component Value Date    CHLPL 195 05/09/2016    CHOL 158 06/25/2020    TRIG 121 06/25/2020    HDL 53 06/25/2020    VLDL 24.2 06/25/2020    LDLHDL 1.52 06/25/2020     Lab Results   Component Value Date    LDL 81 06/25/2020    LDL 84 09/26/2019       Procedures       I personally viewed and interpreted the patient's LAB data         Assessment:     1. Allergic contact dermatitis due to plants, except food    2. Need for prophylactic vaccination and inoculation against cholera alone    3. Need for immunization against influenza    4. Need for prophylactic vaccination against diphtheria and tetanus    5. Mixed hyperlipidemia    6. Nonrheumatic aortic valve insufficiency and mild tricuspid  regurgitation    7. Gastroesophageal reflux disease without esophagitis    8. Vitamin D deficiency    9. Essential hypertension          Plan:     Patient had an area of skin rash on the left elbow which appears to be poison ivy contact dermatitis.  She was given Kenalog shot and was started on low-dose Medrol for next 3 days    Blood pressure is elevated today but patient has known no history of hypertension and does not take any medications she was advised to check her blood pressure closely and let us know we will adjust medications.  Hyperlipidemia has been controlled on Zocor therapy will check lab work next visit.  Patient also needs tetanus booster and flu shot.  Follow-up scheduled        No follow-ups on file.

## 2020-12-07 RX ORDER — MONTELUKAST SODIUM 10 MG/1
10 TABLET ORAL NIGHTLY
Qty: 90 TABLET | Refills: 0 | Status: SHIPPED | OUTPATIENT
Start: 2020-12-07 | End: 2021-03-15

## 2021-01-05 ENCOUNTER — LAB (OUTPATIENT)
Dept: LAB | Facility: HOSPITAL | Age: 76
End: 2021-01-05

## 2021-01-05 DIAGNOSIS — E78.2 MIXED HYPERLIPIDEMIA: ICD-10-CM

## 2021-01-05 DIAGNOSIS — E55.9 VITAMIN D DEFICIENCY: ICD-10-CM

## 2021-01-05 LAB
25(OH)D3 SERPL-MCNC: 38.5 NG/ML (ref 30–100)
ALBUMIN SERPL-MCNC: 4.3 G/DL (ref 3.5–5.2)
ALBUMIN/GLOB SERPL: 2.2 G/DL
ALP SERPL-CCNC: 78 U/L (ref 39–117)
ALT SERPL W P-5'-P-CCNC: 21 U/L (ref 1–33)
ANION GAP SERPL CALCULATED.3IONS-SCNC: 8.9 MMOL/L (ref 5–15)
AST SERPL-CCNC: 18 U/L (ref 1–32)
BASOPHILS # BLD AUTO: 0.08 10*3/MM3 (ref 0–0.2)
BASOPHILS NFR BLD AUTO: 1.8 % (ref 0–1.5)
BILIRUB SERPL-MCNC: 0.7 MG/DL (ref 0–1.2)
BUN SERPL-MCNC: 15 MG/DL (ref 8–23)
BUN/CREAT SERPL: 14.6 (ref 7–25)
CALCIUM SPEC-SCNC: 9.7 MG/DL (ref 8.6–10.5)
CHLORIDE SERPL-SCNC: 104 MMOL/L (ref 98–107)
CHOLEST SERPL-MCNC: 159 MG/DL (ref 0–200)
CK SERPL-CCNC: 70 U/L (ref 20–180)
CO2 SERPL-SCNC: 28.1 MMOL/L (ref 22–29)
CREAT SERPL-MCNC: 1.03 MG/DL (ref 0.57–1)
DEPRECATED RDW RBC AUTO: 39.9 FL (ref 37–54)
EOSINOPHIL # BLD AUTO: 0.17 10*3/MM3 (ref 0–0.4)
EOSINOPHIL NFR BLD AUTO: 3.8 % (ref 0.3–6.2)
ERYTHROCYTE [DISTWIDTH] IN BLOOD BY AUTOMATED COUNT: 12 % (ref 12.3–15.4)
GFR SERPL CREATININE-BSD FRML MDRD: 52 ML/MIN/1.73
GLOBULIN UR ELPH-MCNC: 2 GM/DL
GLUCOSE SERPL-MCNC: 84 MG/DL (ref 65–99)
HCT VFR BLD AUTO: 43 % (ref 34–46.6)
HDLC SERPL-MCNC: 59 MG/DL (ref 40–60)
HGB BLD-MCNC: 14.3 G/DL (ref 12–15.9)
IMM GRANULOCYTES # BLD AUTO: 0.01 10*3/MM3 (ref 0–0.05)
IMM GRANULOCYTES NFR BLD AUTO: 0.2 % (ref 0–0.5)
LDLC SERPL CALC-MCNC: 78 MG/DL (ref 0–100)
LDLC/HDLC SERPL: 1.28 {RATIO}
LYMPHOCYTES # BLD AUTO: 1.49 10*3/MM3 (ref 0.7–3.1)
LYMPHOCYTES NFR BLD AUTO: 33.1 % (ref 19.6–45.3)
MCH RBC QN AUTO: 30.4 PG (ref 26.6–33)
MCHC RBC AUTO-ENTMCNC: 33.3 G/DL (ref 31.5–35.7)
MCV RBC AUTO: 91.5 FL (ref 79–97)
MONOCYTES # BLD AUTO: 0.45 10*3/MM3 (ref 0.1–0.9)
MONOCYTES NFR BLD AUTO: 10 % (ref 5–12)
NEUTROPHILS NFR BLD AUTO: 2.3 10*3/MM3 (ref 1.7–7)
NEUTROPHILS NFR BLD AUTO: 51.1 % (ref 42.7–76)
NRBC BLD AUTO-RTO: 0 /100 WBC (ref 0–0.2)
PLATELET # BLD AUTO: 251 10*3/MM3 (ref 140–450)
PMV BLD AUTO: 10.8 FL (ref 6–12)
POTASSIUM SERPL-SCNC: 4.2 MMOL/L (ref 3.5–5.2)
PROT SERPL-MCNC: 6.3 G/DL (ref 6–8.5)
RBC # BLD AUTO: 4.7 10*6/MM3 (ref 3.77–5.28)
SODIUM SERPL-SCNC: 141 MMOL/L (ref 136–145)
TRIGL SERPL-MCNC: 123 MG/DL (ref 0–150)
VIT B12 BLD-MCNC: 1486 PG/ML (ref 211–946)
VLDLC SERPL-MCNC: 22 MG/DL (ref 5–40)
WBC # BLD AUTO: 4.5 10*3/MM3 (ref 3.4–10.8)

## 2021-01-05 PROCEDURE — 82607 VITAMIN B-12: CPT

## 2021-01-05 PROCEDURE — 36415 COLL VENOUS BLD VENIPUNCTURE: CPT

## 2021-01-05 PROCEDURE — 82550 ASSAY OF CK (CPK): CPT

## 2021-01-05 PROCEDURE — 85025 COMPLETE CBC W/AUTO DIFF WBC: CPT

## 2021-01-05 PROCEDURE — 80053 COMPREHEN METABOLIC PANEL: CPT

## 2021-01-05 PROCEDURE — 82306 VITAMIN D 25 HYDROXY: CPT

## 2021-01-05 PROCEDURE — 80061 LIPID PANEL: CPT

## 2021-01-08 ENCOUNTER — OFFICE VISIT (OUTPATIENT)
Dept: CARDIOLOGY | Facility: CLINIC | Age: 76
End: 2021-01-08

## 2021-01-08 VITALS
BODY MASS INDEX: 24.59 KG/M2 | WEIGHT: 153 LBS | TEMPERATURE: 98 F | SYSTOLIC BLOOD PRESSURE: 140 MMHG | OXYGEN SATURATION: 99 % | HEIGHT: 66 IN | HEART RATE: 72 BPM | DIASTOLIC BLOOD PRESSURE: 74 MMHG

## 2021-01-08 DIAGNOSIS — Z12.11 SCREENING FOR COLON CANCER: ICD-10-CM

## 2021-01-08 DIAGNOSIS — Z23 NEED FOR VACCINATION: ICD-10-CM

## 2021-01-08 DIAGNOSIS — Z12.31 ENCOUNTER FOR SCREENING MAMMOGRAM FOR MALIGNANT NEOPLASM OF BREAST: ICD-10-CM

## 2021-01-08 DIAGNOSIS — E78.2 MIXED HYPERLIPIDEMIA: Primary | ICD-10-CM

## 2021-01-08 DIAGNOSIS — K21.9 GASTROESOPHAGEAL REFLUX DISEASE WITHOUT ESOPHAGITIS: ICD-10-CM

## 2021-01-08 PROCEDURE — 99214 OFFICE O/P EST MOD 30 MIN: CPT | Performed by: INTERNAL MEDICINE

## 2021-01-09 NOTE — PROGRESS NOTES
subjective     Chief Complaint   Patient presents with   • Results     labs   • Hyperlipidemia     Follow up   • Hypertension     Follow up, pt doing well     History of Present Illness  Patient is 75 years old white female who is here for follow-up  Patient has hyperlipidemia, she is taking Zocor 20 mg daily and also has tried to diet.  No drug side effects.    Blood pressure has been upper limits of normal.  Patient is not requiring any medications.  Salt restriction was emphasized.      Patient has mild aortic regurgitation is clinically asymptomatic.      GERD symptoms are very well controlled with Protonix which she is taking regularly.  She also has environmental allergies and is taking Singulair    Past Surgical History:   Procedure Laterality Date   • CARDIAC CATHETERIZATION  2014   • CARDIOVASCULAR STRESS TEST  2014   • COLONOSCOPY     • ECHO - CONVERTED  2014   • ENDOSCOPY  2015   • EXTRACORPOREAL SHOCK WAVE LITHOTRIPSY (ESWL) Left 9/13/2019    Procedure: EXTRACORPOREAL SHOCKWAVE LITHOTRIPSY;  Surgeon: Demarco Aldana MD;  Location: [facility];  Service: Urology   • HYSTERECTOMY  2002    benign   • LAPAROSCOPIC CHOLECYSTECTOMY  2001     Family History   Problem Relation Age of Onset   • Heart attack Mother    • Heart failure Mother    • Hypertension Mother    • Heart attack Father    • Hypertension Father    • Heart block Brother    • Heart block Sister    • Heart block Sister    • Heart attack Brother    • Heart block Brother    • Stroke Brother    • Breast cancer Neg Hx      Past Medical History:   Diagnosis Date   • Asthma    • Elevated cholesterol    • Essential hypertension 9/24/2020   • GERD (gastroesophageal reflux disease)    • Hyperlipidemia    • Kidney stones    • Osteoporosis    • Tongue irritation    • Weight loss      Patient Active Problem List   Diagnosis   • Hyperlipidemia   • Osteoporosis   • Ganglion cyst   • Gastroesophageal reflux disease without esophagitis   • Vitamin D  deficiency   • Continuous leakage of urine   • Cough   • Environmental allergies   • URI, acute   • Chest pain in adult   • Xerostomia   • Chronic fatigue   • Cutaneous candidiasis   • Renal calculus, left   • Screening for breast cancer   • Screening for osteoporosis   • Palpitations   • Nonrheumatic aortic valve insufficiency and mild tricuspid regurgitation   • Allergic contact dermatitis due to plants, except food   • Essential hypertension       Social History     Tobacco Use   • Smoking status: Never Smoker   • Smokeless tobacco: Never Used   Substance Use Topics   • Alcohol use: No   • Drug use: No       Allergies   Allergen Reactions   • Latex Other (See Comments)     Blisters on hands       Current Outpatient Medications on File Prior to Visit   Medication Sig   • aspirin 81 MG EC tablet Take 81 mg by mouth daily.   • cholecalciferol (VITAMIN D3) 1000 UNITS tablet Take 1,000 Units by mouth daily.   • montelukast (SINGULAIR) 10 MG tablet TAKE 1 TABLET BY MOUTH EVERY NIGHT   • Multiple Vitamin (MULTI VITAMIN DAILY PO) Take  by mouth.   • pantoprazole (PROTONIX) 40 MG EC tablet TAKE 1 TABLET BY MOUTH DAILY   • simvastatin (ZOCOR) 20 MG tablet TAKE 1 TABLET BY MOUTH EVERY NIGHT   • vitamin B-12 (CYANOCOBALAMIN) 1000 MCG tablet Take 1,000 mcg by mouth daily.     No current facility-administered medications on file prior to visit.          The following portions of the patient's history were reviewed and updated as appropriate: allergies, current medications, past family history, past medical history, past social history, past surgical history and problem list.    Review of Systems   Constitution: Negative.   HENT: Negative.  Negative for congestion.    Eyes: Negative.    Cardiovascular: Negative.  Negative for chest pain, cyanosis, dyspnea on exertion, irregular heartbeat, leg swelling, near-syncope, orthopnea, palpitations, paroxysmal nocturnal dyspnea and syncope.   Respiratory: Negative.  Negative for  "shortness of breath.    Hematologic/Lymphatic: Negative.    Musculoskeletal: Negative.    Gastrointestinal: Negative.    Neurological: Negative.  Negative for headaches.          Objective:     /74 (BP Location: Left arm, Patient Position: Sitting, Cuff Size: Adult)   Pulse 72   Temp 98 °F (36.7 °C)   Ht 167.6 cm (66\")   Wt 69.4 kg (153 lb)   SpO2 99%   BMI 24.69 kg/m²   Vitals signs and nursing note reviewed.   Constitutional:       General: Not in acute distress.     Appearance: Healthy appearance. Well-developed and not in distress. Not diaphoretic.   Eyes:      General: No scleral icterus.     Conjunctiva/sclera: Conjunctivae normal.      Pupils: Pupils are equal, round, and reactive to light.   HENT:      Head: Normocephalic and atraumatic.   Neck:      Musculoskeletal: Normal range of motion and neck supple.      Thyroid: Thyroid normal. No thyromegaly.      Vascular: No JVD. JVD normal.      Trachea: No tracheal deviation.      Lymphadenopathy: No cervical adenopathy.   Pulmonary:      Effort: Pulmonary effort is normal. No respiratory distress.      Breath sounds: Normal breath sounds and air entry. No wheezing. No rhonchi. No rales.   Chest:      Chest wall: Not tender to palpatation.   Cardiovascular:      PMI at left midclavicular line. Normal rate. Regular rhythm.      No gallop.   Pulses:     Intact distal pulses. No decreased pulses.   Edema:     Peripheral edema absent.   Abdominal:      General: Bowel sounds are normal. There is no distension or abdominal bruit.      Palpations: Abdomen is soft. There is no hepatomegaly, splenomegaly or abdominal mass.      Tenderness: There is no abdominal tenderness. There is no guarding or rebound.   Skin:     General: Skin is warm and dry. There is no cyanosis.      Coloration: Skin is not jaundiced or pale.      Findings: No erythema or rash.   Neurological:      Mental Status: Alert, oriented to person, place, and time and oriented to person, place " and time.   Psychiatric:         Attention and Perception: Attention normal.         Mood and Affect: Mood and affect normal.         Speech: Speech normal.         Behavior: Behavior is cooperative.           Lab Review  Lab Results   Component Value Date     01/05/2021    K 4.2 01/05/2021     01/05/2021    BUN 15 01/05/2021    CREATININE 1.03 (H) 01/05/2021    GLUCOSE 84 01/05/2021    CALCIUM 9.7 01/05/2021    ALT 21 01/05/2021    ALKPHOS 78 01/05/2021    LABIL2 1.7 05/09/2016     Lab Results   Component Value Date    CKTOTAL 70 01/05/2021     Lab Results   Component Value Date    WBC 4.50 01/05/2021    HGB 14.3 01/05/2021    HCT 43.0 01/05/2021     01/05/2021     Lab Results   Component Value Date    INR 0.95 05/19/2014     No results found for: MG  Lab Results   Component Value Date    TSH 2.389 10/27/2016     No results found for: BNP  Lab Results   Component Value Date    CHLPL 195 05/09/2016    CHOL 159 01/05/2021    TRIG 123 01/05/2021    HDL 59 01/05/2021    VLDL 22 01/05/2021    LDLHDL 1.28 01/05/2021     Lab Results   Component Value Date    LDL 78 01/05/2021    LDL 81 06/25/2020       Procedures       I personally viewed and interpreted the patient's LAB data         Assessment:     1. Mixed hyperlipidemia    2. Encounter for screening mammogram for malignant neoplasm of breast    3. Need for vaccination    4. Screening for colon cancer    5. Gastroesophageal reflux disease without esophagitis          Plan:        Lab work reviewed and discussed with the patient lipid panel is normal with LDL of 78.  Patient will continue Zocor 20 mg daily.  GERD symptoms are also controlled Protonix was continued.  Singulair was continued because of environmental allergies.  Shingrix was recommended and arranged.  Mammogram was also discussed and arranged.  Colonoscopy was discussed however because of Covid and patient being totally asymptomatic patient wants to postpone it for now.  Healthy  lifestyle discussed.  Follow-up scheduled      No follow-ups on file.

## 2021-01-27 ENCOUNTER — IMMUNIZATION (OUTPATIENT)
Dept: VACCINE CLINIC | Facility: HOSPITAL | Age: 76
End: 2021-01-27

## 2021-01-27 PROCEDURE — 0001A: CPT | Performed by: FAMILY MEDICINE

## 2021-01-27 PROCEDURE — 91300 HC SARSCOV02 VAC 30MCG/0.3ML IM: CPT | Performed by: FAMILY MEDICINE

## 2021-02-09 ENCOUNTER — HOSPITAL ENCOUNTER (OUTPATIENT)
Dept: MAMMOGRAPHY | Facility: HOSPITAL | Age: 76
Discharge: HOME | End: 2021-02-09
Admitting: INTERNAL MEDICINE

## 2021-02-09 DIAGNOSIS — Z12.31 ENCOUNTER FOR SCREENING MAMMOGRAM FOR MALIGNANT NEOPLASM OF BREAST: ICD-10-CM

## 2021-02-09 PROCEDURE — 77063 BREAST TOMOSYNTHESIS BI: CPT

## 2021-02-09 PROCEDURE — 77067 SCR MAMMO BI INCL CAD: CPT

## 2021-02-09 PROCEDURE — 77067 SCR MAMMO BI INCL CAD: CPT | Performed by: RADIOLOGY

## 2021-02-09 PROCEDURE — 77063 BREAST TOMOSYNTHESIS BI: CPT | Performed by: RADIOLOGY

## 2021-02-17 ENCOUNTER — IMMUNIZATION (OUTPATIENT)
Dept: VACCINE CLINIC | Facility: HOSPITAL | Age: 76
End: 2021-02-17

## 2021-02-17 PROCEDURE — 0002A: CPT | Performed by: INTERNAL MEDICINE

## 2021-02-17 PROCEDURE — 91300 HC SARSCOV02 VAC 30MCG/0.3ML IM: CPT | Performed by: INTERNAL MEDICINE

## 2021-03-15 RX ORDER — MONTELUKAST SODIUM 10 MG/1
10 TABLET ORAL NIGHTLY
Qty: 90 TABLET | Refills: 0 | Status: SHIPPED | OUTPATIENT
Start: 2021-03-15 | End: 2021-06-14

## 2021-03-15 RX ORDER — PANTOPRAZOLE SODIUM 40 MG/1
40 TABLET, DELAYED RELEASE ORAL DAILY
Qty: 90 TABLET | Refills: 3 | Status: SHIPPED | OUTPATIENT
Start: 2021-03-15 | End: 2022-03-09

## 2021-04-01 ENCOUNTER — OFFICE VISIT (OUTPATIENT)
Dept: CARDIOLOGY | Facility: CLINIC | Age: 76
End: 2021-04-01

## 2021-04-01 VITALS
TEMPERATURE: 97.7 F | SYSTOLIC BLOOD PRESSURE: 122 MMHG | HEIGHT: 66 IN | WEIGHT: 155 LBS | OXYGEN SATURATION: 99 % | HEART RATE: 73 BPM | BODY MASS INDEX: 24.91 KG/M2 | DIASTOLIC BLOOD PRESSURE: 72 MMHG

## 2021-04-01 DIAGNOSIS — Z91.09 ENVIRONMENTAL ALLERGIES: ICD-10-CM

## 2021-04-01 DIAGNOSIS — K21.9 GASTROESOPHAGEAL REFLUX DISEASE WITHOUT ESOPHAGITIS: ICD-10-CM

## 2021-04-01 DIAGNOSIS — E55.9 VITAMIN D DEFICIENCY: ICD-10-CM

## 2021-04-01 DIAGNOSIS — I35.1 NONRHEUMATIC AORTIC VALVE INSUFFICIENCY: ICD-10-CM

## 2021-04-01 DIAGNOSIS — R00.2 PALPITATIONS: ICD-10-CM

## 2021-04-01 DIAGNOSIS — R53.82 CHRONIC FATIGUE: ICD-10-CM

## 2021-04-01 DIAGNOSIS — Z23 NEED FOR PROPHYLACTIC VACCINATION AND INOCULATION AGAINST VARICELLA: ICD-10-CM

## 2021-04-01 DIAGNOSIS — E78.2 MIXED HYPERLIPIDEMIA: Primary | ICD-10-CM

## 2021-04-01 PROCEDURE — 99214 OFFICE O/P EST MOD 30 MIN: CPT | Performed by: INTERNAL MEDICINE

## 2021-04-01 RX ORDER — FERROUS SULFATE 325(65) MG
1 TABLET ORAL DAILY
COMMUNITY
Start: 2021-01-06 | End: 2021-09-28 | Stop reason: ALTCHOICE

## 2021-04-02 PROBLEM — I10 ESSENTIAL HYPERTENSION: Status: RESOLVED | Noted: 2020-09-24 | Resolved: 2021-04-02

## 2021-04-02 NOTE — PROGRESS NOTES
subjective     Chief Complaint   Patient presents with   • Hypertension   • Palpitations     follow up pt denies any symptoms doing well     History of Present Illness  Patient is 75 years old white female who is here for follow-up.  She has multiple chronic medical problems.  Overall she seems to be doing very well.  Blood pressure is normal.  Patient is not requiring any medications.  Hyperlipidemia is being treated with Zocor 20 mg daily.  Patient has no drug side effects.  Lab work scheduled.  GERD symptoms are better with Protonix.  Environmental allergies controlled with Singulair overall she seems be doing fairly well with no specific new complaints.    Past Surgical History:   Procedure Laterality Date   • CARDIAC CATHETERIZATION  2014   • CARDIOVASCULAR STRESS TEST  2014   • COLONOSCOPY     • ECHO - CONVERTED  2014   • ENDOSCOPY  2015   • EXTRACORPOREAL SHOCK WAVE LITHOTRIPSY (ESWL) Left 9/13/2019    Procedure: EXTRACORPOREAL SHOCKWAVE LITHOTRIPSY;  Surgeon: Demarco Aldana MD;  Location: [facility];  Service: Urology   • HYSTERECTOMY  2002    benign   • LAPAROSCOPIC CHOLECYSTECTOMY  2001     Family History   Problem Relation Age of Onset   • Heart attack Mother    • Heart failure Mother    • Hypertension Mother    • Heart attack Father    • Hypertension Father    • Heart block Brother    • Heart block Sister    • Heart block Sister    • Heart attack Brother    • Heart block Brother    • Stroke Brother    • Breast cancer Neg Hx      Past Medical History:   Diagnosis Date   • Asthma    • Elevated cholesterol    • Essential hypertension 9/24/2020   • GERD (gastroesophageal reflux disease)    • Hyperlipidemia    • Kidney stones    • Osteoporosis    • Tongue irritation    • Weight loss      Patient Active Problem List   Diagnosis   • Mixed hyperlipidemia   • Osteoporosis   • Ganglion cyst   • Gastroesophageal reflux disease without esophagitis   • Vitamin D deficiency   • Continuous leakage of urine   •  Cough   • Environmental allergies   • URI, acute   • Chest pain in adult   • Xerostomia   • Chronic fatigue   • Cutaneous candidiasis   • Renal calculus, left   • Screening for breast cancer   • Screening for osteoporosis   • Palpitations   • Nonrheumatic aortic valve insufficiency and mild tricuspid regurgitation   • Allergic contact dermatitis due to plants, except food       Social History     Tobacco Use   • Smoking status: Never Smoker   • Smokeless tobacco: Never Used   Substance Use Topics   • Alcohol use: No   • Drug use: No       Allergies   Allergen Reactions   • Latex Other (See Comments)     Blisters on hands       Current Outpatient Medications on File Prior to Visit   Medication Sig   • aspirin 81 MG EC tablet Take 81 mg by mouth daily.   • cholecalciferol (VITAMIN D3) 1000 UNITS tablet Take 1,000 Units by mouth daily.   • ferrous sulfate 325 (65 FE) MG tablet Take 1 tablet by mouth Daily.   • montelukast (SINGULAIR) 10 MG tablet TAKE 1 TABLET BY MOUTH EVERY NIGHT   • Multiple Vitamin (MULTI VITAMIN DAILY PO) Take  by mouth.   • pantoprazole (PROTONIX) 40 MG EC tablet TAKE 1 TABLET BY MOUTH DAILY   • simvastatin (ZOCOR) 20 MG tablet TAKE 1 TABLET BY MOUTH EVERY NIGHT   • vitamin B-12 (CYANOCOBALAMIN) 1000 MCG tablet Take 1,000 mcg by mouth daily.     No current facility-administered medications on file prior to visit.         The following portions of the patient's history were reviewed and updated as appropriate: allergies, current medications, past family history, past medical history, past social history, past surgical history and problem list.    Review of Systems   Constitutional: Negative.   HENT: Negative.  Negative for congestion.    Eyes: Negative.    Cardiovascular: Negative.  Negative for chest pain, cyanosis, dyspnea on exertion, irregular heartbeat, leg swelling, near-syncope, orthopnea, palpitations, paroxysmal nocturnal dyspnea and syncope.   Respiratory: Negative.  Negative for  "shortness of breath.    Hematologic/Lymphatic: Negative.    Musculoskeletal: Negative.    Gastrointestinal: Negative.    Neurological: Negative.  Negative for headaches.          Objective:     /72 (BP Location: Left arm, Patient Position: Sitting, Cuff Size: Adult)   Pulse 73   Temp 97.7 °F (36.5 °C)   Ht 167.6 cm (66\")   Wt 70.3 kg (155 lb)   SpO2 99%   BMI 25.02 kg/m²   Vitals and nursing note reviewed.   Pulmonary:      Effort: Pulmonary effort is normal.      Breath sounds: Normal breath sounds. No stridor. No wheezing. No rhonchi. No rales.   Cardiovascular:      PMI at left midclavicular line. Normal rate. Regular rhythm. Normal S1. Normal S2.      Murmurs: There is no murmur.      No gallop. No click. No rub.   Pulses:     Intact distal pulses.   Edema:     Peripheral edema absent.           Lab Review  Lab Results   Component Value Date     01/05/2021    K 4.2 01/05/2021     01/05/2021    BUN 15 01/05/2021    CREATININE 1.03 (H) 01/05/2021    GLUCOSE 84 01/05/2021    CALCIUM 9.7 01/05/2021    ALT 21 01/05/2021    ALKPHOS 78 01/05/2021    LABIL2 1.7 05/09/2016     Lab Results   Component Value Date    CKTOTAL 70 01/05/2021     Lab Results   Component Value Date    WBC 4.50 01/05/2021    HGB 14.3 01/05/2021    HCT 43.0 01/05/2021     01/05/2021     Lab Results   Component Value Date    INR 0.95 05/19/2014     No results found for: MG  Lab Results   Component Value Date    TSH 2.389 10/27/2016     No results found for: BNP  Lab Results   Component Value Date    CHLPL 195 05/09/2016    CHOL 159 01/05/2021    TRIG 123 01/05/2021    HDL 59 01/05/2021    VLDL 22 01/05/2021    LDLHDL 1.28 01/05/2021     Lab Results   Component Value Date    LDL 78 01/05/2021    LDL 81 06/25/2020       Procedures       I personally viewed and interpreted the patient's LAB data         Assessment:     1. Mixed hyperlipidemia    2. Need for prophylactic vaccination and inoculation against varicella    3. " Nonrheumatic aortic valve insufficiency and mild tricuspid regurgitation    4. Vitamin D deficiency    5. Chronic fatigue    6. Palpitations    7. Gastroesophageal reflux disease without esophagitis    8. Environmental allergies          Plan:     Hyperlipidemia  Very well controlled.  Last LDL 78.  Zocor 20 mg daily was continued.  Lab work scheduled for next visit.  Healthy lifestyle emphasized.     mild aortic and tricuspid regurgitation   clinically stable no evidence of heart failure.    Environmental allergies  Doing very well with Singulair which was continued.    GERD  Protonix was continued.  Will check vitamin D and B12 level other lab work including CBC CMP lipid panel also scheduled.  Follow-up in 3 months      No follow-ups on file.

## 2021-06-14 RX ORDER — MONTELUKAST SODIUM 10 MG/1
10 TABLET ORAL NIGHTLY
Qty: 90 TABLET | Refills: 0 | Status: SHIPPED | OUTPATIENT
Start: 2021-06-14 | End: 2021-09-10

## 2021-06-24 ENCOUNTER — LAB (OUTPATIENT)
Dept: LAB | Facility: HOSPITAL | Age: 76
End: 2021-06-24

## 2021-06-24 DIAGNOSIS — E78.2 MIXED HYPERLIPIDEMIA: ICD-10-CM

## 2021-06-24 DIAGNOSIS — E55.9 VITAMIN D DEFICIENCY: ICD-10-CM

## 2021-06-24 DIAGNOSIS — R53.82 CHRONIC FATIGUE: ICD-10-CM

## 2021-06-24 LAB
25(OH)D3 SERPL-MCNC: 40.7 NG/ML
ALBUMIN SERPL-MCNC: 4.1 G/DL (ref 3.5–5.2)
ALBUMIN/GLOB SERPL: 2.1 G/DL
ALP SERPL-CCNC: 72 U/L (ref 39–117)
ALT SERPL W P-5'-P-CCNC: 29 U/L (ref 1–33)
ANION GAP SERPL CALCULATED.3IONS-SCNC: 6.9 MMOL/L (ref 5–15)
AST SERPL-CCNC: 21 U/L (ref 1–32)
BASOPHILS # BLD AUTO: 0.05 10*3/MM3 (ref 0–0.2)
BASOPHILS NFR BLD AUTO: 1.4 % (ref 0–1.5)
BILIRUB SERPL-MCNC: 0.5 MG/DL (ref 0–1.2)
BUN SERPL-MCNC: 19 MG/DL (ref 8–23)
BUN/CREAT SERPL: 17.8 (ref 7–25)
CALCIUM SPEC-SCNC: 9.5 MG/DL (ref 8.6–10.5)
CHLORIDE SERPL-SCNC: 107 MMOL/L (ref 98–107)
CHOLEST SERPL-MCNC: 159 MG/DL (ref 0–200)
CK SERPL-CCNC: 97 U/L (ref 20–180)
CO2 SERPL-SCNC: 28.1 MMOL/L (ref 22–29)
CREAT SERPL-MCNC: 1.07 MG/DL (ref 0.57–1)
DEPRECATED RDW RBC AUTO: 44 FL (ref 37–54)
EOSINOPHIL # BLD AUTO: 0.21 10*3/MM3 (ref 0–0.4)
EOSINOPHIL NFR BLD AUTO: 5.7 % (ref 0.3–6.2)
ERYTHROCYTE [DISTWIDTH] IN BLOOD BY AUTOMATED COUNT: 12.6 % (ref 12.3–15.4)
GFR SERPL CREATININE-BSD FRML MDRD: 50 ML/MIN/1.73
GLOBULIN UR ELPH-MCNC: 2 GM/DL
GLUCOSE SERPL-MCNC: 84 MG/DL (ref 65–99)
HCT VFR BLD AUTO: 39.9 % (ref 34–46.6)
HDLC SERPL-MCNC: 54 MG/DL (ref 40–60)
HGB BLD-MCNC: 13.2 G/DL (ref 12–15.9)
IMM GRANULOCYTES # BLD AUTO: 0.01 10*3/MM3 (ref 0–0.05)
IMM GRANULOCYTES NFR BLD AUTO: 0.3 % (ref 0–0.5)
LDLC SERPL CALC-MCNC: 88 MG/DL (ref 0–100)
LDLC/HDLC SERPL: 1.6 {RATIO}
LYMPHOCYTES # BLD AUTO: 1.35 10*3/MM3 (ref 0.7–3.1)
LYMPHOCYTES NFR BLD AUTO: 36.9 % (ref 19.6–45.3)
MCH RBC QN AUTO: 31.1 PG (ref 26.6–33)
MCHC RBC AUTO-ENTMCNC: 33.1 G/DL (ref 31.5–35.7)
MCV RBC AUTO: 94.1 FL (ref 79–97)
MONOCYTES # BLD AUTO: 0.41 10*3/MM3 (ref 0.1–0.9)
MONOCYTES NFR BLD AUTO: 11.2 % (ref 5–12)
NEUTROPHILS NFR BLD AUTO: 1.63 10*3/MM3 (ref 1.7–7)
NEUTROPHILS NFR BLD AUTO: 44.5 % (ref 42.7–76)
NRBC BLD AUTO-RTO: 0 /100 WBC (ref 0–0.2)
PLATELET # BLD AUTO: 213 10*3/MM3 (ref 140–450)
PMV BLD AUTO: 11.4 FL (ref 6–12)
POTASSIUM SERPL-SCNC: 4.4 MMOL/L (ref 3.5–5.2)
PROT SERPL-MCNC: 6.1 G/DL (ref 6–8.5)
RBC # BLD AUTO: 4.24 10*6/MM3 (ref 3.77–5.28)
SODIUM SERPL-SCNC: 142 MMOL/L (ref 136–145)
TRIGL SERPL-MCNC: 93 MG/DL (ref 0–150)
VIT B12 BLD-MCNC: 946 PG/ML (ref 211–946)
VLDLC SERPL-MCNC: 17 MG/DL (ref 5–40)
WBC # BLD AUTO: 3.66 10*3/MM3 (ref 3.4–10.8)

## 2021-06-24 PROCEDURE — 36415 COLL VENOUS BLD VENIPUNCTURE: CPT

## 2021-06-24 PROCEDURE — 82306 VITAMIN D 25 HYDROXY: CPT

## 2021-06-24 PROCEDURE — 80053 COMPREHEN METABOLIC PANEL: CPT

## 2021-06-24 PROCEDURE — 82550 ASSAY OF CK (CPK): CPT

## 2021-06-24 PROCEDURE — 85025 COMPLETE CBC W/AUTO DIFF WBC: CPT

## 2021-06-24 PROCEDURE — 80061 LIPID PANEL: CPT

## 2021-06-24 PROCEDURE — 82607 VITAMIN B-12: CPT

## 2021-06-30 ENCOUNTER — OFFICE VISIT (OUTPATIENT)
Dept: CARDIOLOGY | Facility: CLINIC | Age: 76
End: 2021-06-30

## 2021-06-30 VITALS
WEIGHT: 156 LBS | OXYGEN SATURATION: 99 % | HEIGHT: 66 IN | DIASTOLIC BLOOD PRESSURE: 78 MMHG | SYSTOLIC BLOOD PRESSURE: 130 MMHG | BODY MASS INDEX: 25.07 KG/M2 | TEMPERATURE: 97.3 F | HEART RATE: 73 BPM

## 2021-06-30 DIAGNOSIS — E78.2 MIXED HYPERLIPIDEMIA: ICD-10-CM

## 2021-06-30 DIAGNOSIS — R60.0 BILATERAL LOWER EXTREMITY EDEMA: Primary | ICD-10-CM

## 2021-06-30 DIAGNOSIS — K21.9 GASTROESOPHAGEAL REFLUX DISEASE WITHOUT ESOPHAGITIS: ICD-10-CM

## 2021-06-30 DIAGNOSIS — R00.2 PALPITATIONS: ICD-10-CM

## 2021-06-30 DIAGNOSIS — I35.1 NONRHEUMATIC AORTIC VALVE INSUFFICIENCY: ICD-10-CM

## 2021-06-30 PROCEDURE — 99214 OFFICE O/P EST MOD 30 MIN: CPT | Performed by: INTERNAL MEDICINE

## 2021-06-30 PROCEDURE — 93000 ELECTROCARDIOGRAM COMPLETE: CPT | Performed by: INTERNAL MEDICINE

## 2021-06-30 RX ORDER — FUROSEMIDE 20 MG/1
20 TABLET ORAL EVERY OTHER DAY
Qty: 45 TABLET | Refills: 2 | Status: SHIPPED | OUTPATIENT
Start: 2021-06-30 | End: 2022-04-07 | Stop reason: SDUPTHER

## 2021-06-30 NOTE — PROGRESS NOTES
subjective     Chief Complaint   Patient presents with   • Hyperlipidemia   • Results     LABS   • Edema     History of Present Illness  Patient is 76 years old white female who is here for follow-up.  Patient has multiple chronic medical problems however she is doing very well.  She complains of lower extremity edema but no shortness of breath orthopnea PND.  No history of chest pain.  Patient has hyperlipidemia and is taking Zocor without any drug side effects.  GI symptoms are better with Protonix.  She has multiple environmental allergies and doing very well with Singulair.    Patient also has mild aortic insufficiency clinically no evidence of heart failure.  She does complain of bilateral lower extremity edema    She had lab work done which will be reviewed and discussed    Past Surgical History:   Procedure Laterality Date   • CARDIAC CATHETERIZATION  2014   • CARDIOVASCULAR STRESS TEST  2014   • COLONOSCOPY     • ECHO - CONVERTED  2014   • ENDOSCOPY  2015   • EXTRACORPOREAL SHOCK WAVE LITHOTRIPSY (ESWL) Left 9/13/2019    Procedure: EXTRACORPOREAL SHOCKWAVE LITHOTRIPSY;  Surgeon: Demarco Aldana MD;  Location: [facility];  Service: Urology   • HYSTERECTOMY  2002    benign   • LAPAROSCOPIC CHOLECYSTECTOMY  2001     Family History   Problem Relation Age of Onset   • Heart attack Mother    • Heart failure Mother    • Hypertension Mother    • Heart attack Father    • Hypertension Father    • Heart block Brother    • Heart block Sister    • Heart block Sister    • Heart attack Brother    • Heart block Brother    • Stroke Brother    • Breast cancer Neg Hx      Past Medical History:   Diagnosis Date   • Asthma    • Elevated cholesterol    • Essential hypertension 9/24/2020   • GERD (gastroesophageal reflux disease)    • Hyperlipidemia    • Kidney stones    • Osteoporosis    • Tongue irritation    • Weight loss      Patient Active Problem List   Diagnosis   • Mixed hyperlipidemia   • Osteoporosis   • Ganglion  cyst   • Gastroesophageal reflux disease without esophagitis   • Vitamin D deficiency   • Continuous leakage of urine   • Cough   • Environmental allergies   • URI, acute   • Chest pain in adult   • Xerostomia   • Chronic fatigue   • Cutaneous candidiasis   • Renal calculus, left   • Screening for breast cancer   • Screening for osteoporosis   • Palpitations   • Nonrheumatic aortic valve insufficiency and mild tricuspid regurgitation   • Allergic contact dermatitis due to plants, except food   • Bilateral lower extremity edema       Social History     Tobacco Use   • Smoking status: Never Smoker   • Smokeless tobacco: Never Used   Substance Use Topics   • Alcohol use: No   • Drug use: No       Allergies   Allergen Reactions   • Latex Other (See Comments)     Blisters on hands       Current Outpatient Medications on File Prior to Visit   Medication Sig   • aspirin 81 MG EC tablet Take 81 mg by mouth daily.   • cholecalciferol (VITAMIN D3) 1000 UNITS tablet Take 1,000 Units by mouth daily.   • ferrous sulfate 325 (65 FE) MG tablet Take 1 tablet by mouth Daily.   • montelukast (SINGULAIR) 10 MG tablet TAKE 1 TABLET BY MOUTH EVERY NIGHT   • Multiple Vitamin (MULTI VITAMIN DAILY PO) Take  by mouth.   • pantoprazole (PROTONIX) 40 MG EC tablet TAKE 1 TABLET BY MOUTH DAILY   • simvastatin (ZOCOR) 20 MG tablet TAKE 1 TABLET BY MOUTH EVERY NIGHT   • vitamin B-12 (CYANOCOBALAMIN) 1000 MCG tablet Take 1,000 mcg by mouth daily.     No current facility-administered medications on file prior to visit.         The following portions of the patient's history were reviewed and updated as appropriate: allergies, current medications, past family history, past medical history, past social history, past surgical history and problem list.    Review of Systems   Constitutional: Negative.   HENT: Negative.  Negative for congestion.    Eyes: Negative.    Cardiovascular: Positive for leg swelling. Negative for chest pain, cyanosis, dyspnea on  "exertion, irregular heartbeat, near-syncope, orthopnea, palpitations, paroxysmal nocturnal dyspnea and syncope.   Respiratory: Negative.  Negative for shortness of breath.    Hematologic/Lymphatic: Negative.    Musculoskeletal: Negative.    Gastrointestinal: Negative.    Neurological: Negative.  Negative for headaches.          Objective:     /78 (BP Location: Left arm, Patient Position: Sitting, Cuff Size: Adult)   Pulse 73   Temp 97.3 °F (36.3 °C) (Infrared)   Ht 167.6 cm (66\")   Wt 70.8 kg (156 lb)   SpO2 99%   BMI 25.18 kg/m²   Pulmonary:      Effort: Pulmonary effort is normal.      Breath sounds: Normal breath sounds. No stridor. No wheezing. No rhonchi. No rales.   Cardiovascular:      PMI at left midclavicular line. Normal rate. Regular rhythm. Normal S1. Normal S2.      Murmurs: There is no murmur.      No gallop. No click. No rub.   Pulses:     Intact distal pulses.   Edema:     Peripheral edema absent.           Lab Review  Lab Results   Component Value Date     06/24/2021    K 4.4 06/24/2021     06/24/2021    BUN 19 06/24/2021    CREATININE 1.07 (H) 06/24/2021    GLUCOSE 84 06/24/2021    CALCIUM 9.5 06/24/2021    ALT 29 06/24/2021    ALKPHOS 72 06/24/2021    LABIL2 1.7 05/09/2016     Lab Results   Component Value Date    CKTOTAL 97 06/24/2021     Lab Results   Component Value Date    WBC 3.66 06/24/2021    HGB 13.2 06/24/2021    HCT 39.9 06/24/2021     06/24/2021     Lab Results   Component Value Date    INR 0.95 05/19/2014     No results found for: MG  Lab Results   Component Value Date    TSH 2.389 10/27/2016     No results found for: BNP  Lab Results   Component Value Date    CHLPL 195 05/09/2016    CHOL 159 06/24/2021    TRIG 93 06/24/2021    HDL 54 06/24/2021    VLDL 17 06/24/2021    LDLHDL 1.60 06/24/2021     Lab Results   Component Value Date    LDL 88 06/24/2021    LDL 78 01/05/2021         ECG 12 Lead    Date/Time: 6/30/2021 4:17 PM  Performed by: Víctor Rodrigues " MD Jayy  Authorized by: Víctor Rodrigues MD   Comparison: compared with previous ECG from 1/2/2020  Similar to previous ECG  Rhythm: sinus rhythm  Rate: normal  BPM: 74  Conduction: conduction normal  ST Segments: ST segments normal  T Waves: T waves normal  QRS axis: normal  Other findings: non-specific ST-T wave changes    Clinical impression: normal ECG               I personally viewed and interpreted the patient's LAB data         Assessment:     1. Bilateral lower extremity edema    2. Palpitations    3. Nonrheumatic aortic valve insufficiency and mild tricuspid regurgitation    4. Mixed hyperlipidemia    5. Gastroesophageal reflux disease without esophagitis          Plan:     Lab work reviewed and discussed with the patient  CBC CMP lipid panel is normal.    LDL is 88 patient was advised to continue Zocor 20 mg daily.    Protonix was continued GERD symptoms are better.    Patient complains of bilateral lower extremity edema and she was advised to take Lasix 20 mg 2-3 times per week on as needed basis.  Renal failure was discussed with the patient  Patient has mild aortic insufficiency clinically stable  Healthy lifestyle emphasized and follow-up scheduled        No follow-ups on file.

## 2021-08-20 ENCOUNTER — LAB (OUTPATIENT)
Dept: LAB | Facility: HOSPITAL | Age: 76
End: 2021-08-20

## 2021-08-20 ENCOUNTER — TRANSCRIBE ORDERS (OUTPATIENT)
Dept: ADMINISTRATIVE | Facility: HOSPITAL | Age: 76
End: 2021-08-20

## 2021-08-20 DIAGNOSIS — Z01.818 PREOPERATIVE CLEARANCE: ICD-10-CM

## 2021-08-20 DIAGNOSIS — Z01.818 PREOPERATIVE CLEARANCE: Primary | ICD-10-CM

## 2021-08-23 ENCOUNTER — LAB (OUTPATIENT)
Dept: LAB | Facility: HOSPITAL | Age: 76
End: 2021-08-23

## 2021-08-23 ENCOUNTER — TRANSCRIBE ORDERS (OUTPATIENT)
Dept: ADMINISTRATIVE | Facility: HOSPITAL | Age: 76
End: 2021-08-23

## 2021-08-23 DIAGNOSIS — Z01.818 OTHER SPECIFIED PRE-OPERATIVE EXAMINATION: Primary | ICD-10-CM

## 2021-08-23 DIAGNOSIS — Z01.818 OTHER SPECIFIED PRE-OPERATIVE EXAMINATION: ICD-10-CM

## 2021-08-23 PROCEDURE — U0005 INFEC AGEN DETEC AMPLI PROBE: HCPCS | Performed by: OPHTHALMOLOGY

## 2021-08-23 PROCEDURE — U0004 COV-19 TEST NON-CDC HGH THRU: HCPCS | Performed by: OPHTHALMOLOGY

## 2021-08-23 PROCEDURE — C9803 HOPD COVID-19 SPEC COLLECT: HCPCS

## 2021-08-24 LAB — SARS-COV-2 RNA PNL SPEC NAA+PROBE: NOT DETECTED

## 2021-09-01 ENCOUNTER — TRANSCRIBE ORDERS (OUTPATIENT)
Dept: ADMINISTRATIVE | Facility: HOSPITAL | Age: 76
End: 2021-09-01

## 2021-09-01 DIAGNOSIS — Z01.818 PRE-OPERATIVE CLEARANCE: Primary | ICD-10-CM

## 2021-09-06 ENCOUNTER — LAB (OUTPATIENT)
Dept: LAB | Facility: HOSPITAL | Age: 76
End: 2021-09-06

## 2021-09-06 DIAGNOSIS — Z01.818 PRE-OPERATIVE CLEARANCE: ICD-10-CM

## 2021-09-06 LAB — SARS-COV-2 RNA PNL SPEC NAA+PROBE: NOT DETECTED

## 2021-09-06 PROCEDURE — U0005 INFEC AGEN DETEC AMPLI PROBE: HCPCS

## 2021-09-06 PROCEDURE — U0004 COV-19 TEST NON-CDC HGH THRU: HCPCS

## 2021-09-06 PROCEDURE — C9803 HOPD COVID-19 SPEC COLLECT: HCPCS

## 2021-09-10 RX ORDER — MONTELUKAST SODIUM 10 MG/1
10 TABLET ORAL NIGHTLY
Qty: 90 TABLET | Refills: 0 | Status: SHIPPED | OUTPATIENT
Start: 2021-09-10 | End: 2021-12-09

## 2021-09-10 RX ORDER — SIMVASTATIN 20 MG
20 TABLET ORAL NIGHTLY
Qty: 90 TABLET | Refills: 3 | Status: SHIPPED | OUTPATIENT
Start: 2021-09-10 | End: 2022-01-06

## 2021-09-28 ENCOUNTER — OFFICE VISIT (OUTPATIENT)
Dept: CARDIOLOGY | Facility: CLINIC | Age: 76
End: 2021-09-28

## 2021-09-28 VITALS
SYSTOLIC BLOOD PRESSURE: 128 MMHG | DIASTOLIC BLOOD PRESSURE: 70 MMHG | TEMPERATURE: 97.9 F | HEART RATE: 71 BPM | OXYGEN SATURATION: 98 % | WEIGHT: 154 LBS | BODY MASS INDEX: 24.75 KG/M2 | HEIGHT: 66 IN

## 2021-09-28 DIAGNOSIS — Z91.09 ENVIRONMENTAL ALLERGIES: ICD-10-CM

## 2021-09-28 DIAGNOSIS — E78.2 MIXED HYPERLIPIDEMIA: Primary | ICD-10-CM

## 2021-09-28 DIAGNOSIS — E55.9 VITAMIN D DEFICIENCY: ICD-10-CM

## 2021-09-28 DIAGNOSIS — R53.82 CHRONIC FATIGUE: ICD-10-CM

## 2021-09-28 DIAGNOSIS — I35.1 NONRHEUMATIC AORTIC VALVE INSUFFICIENCY: ICD-10-CM

## 2021-09-28 PROCEDURE — 99214 OFFICE O/P EST MOD 30 MIN: CPT | Performed by: INTERNAL MEDICINE

## 2021-09-28 NOTE — PROGRESS NOTES
subjective     Chief Complaint   Patient presents with   • Leg Swelling     follow up  pt states edema has improved    • Hypertension     follow up  pt denies any symptoms     History of Present Illness    Patient is 76 years old who is here for follow-up of multiple chronic medical problems.  Patient has hyperlipidemia, she has been taking Zocor 20 mg daily and denies any side effects.  Lab work has been normal in the past.  GERD symptoms are better with Protonix.  Patient has environmental allergies and takes Singulair.    Patient complained of lateral ankle edema however she has mild renal dysfunction patient was advised to take Lasix 20 mg 2-3 times a week on as needed basis depending on the ankle edema.  She has been taking it and feeling good she denies any ankle edema at this time.    She also takes vitamin D and B12.  Will arrange lab work for next visit.    She denies any chest pain or palpitations.  She has history of mild aortic regurgitation and mild to moderate tricuspid regurgitation.  Past Surgical History:   Procedure Laterality Date   • CARDIAC CATHETERIZATION  2014   • CARDIOVASCULAR STRESS TEST  2014   • CATARACT EXTRACTION, BILATERAL  09/2021   • COLONOSCOPY     • ECHO - CONVERTED  2014   • ENDOSCOPY  2015   • EXTRACORPOREAL SHOCK WAVE LITHOTRIPSY (ESWL) Left 9/13/2019    Procedure: EXTRACORPOREAL SHOCKWAVE LITHOTRIPSY;  Surgeon: Demarco Aldana MD;  Location: [facility];  Service: Urology   • HYSTERECTOMY  2002    benign   • LAPAROSCOPIC CHOLECYSTECTOMY  2001     Family History   Problem Relation Age of Onset   • Heart attack Mother    • Heart failure Mother    • Hypertension Mother    • Heart attack Father    • Hypertension Father    • Heart block Brother    • Heart block Sister    • Heart block Sister    • Heart attack Brother    • Heart block Brother    • Stroke Brother    • Breast cancer Neg Hx      Past Medical History:   Diagnosis Date   • Asthma    • Elevated cholesterol    •  Essential hypertension 9/24/2020   • GERD (gastroesophageal reflux disease)    • Hyperlipidemia    • Kidney stones    • Osteoporosis    • Tongue irritation    • Weight loss      Patient Active Problem List   Diagnosis   • Mixed hyperlipidemia   • Osteoporosis   • Ganglion cyst   • Gastroesophageal reflux disease without esophagitis   • Vitamin D deficiency   • Continuous leakage of urine   • Cough   • Environmental allergies   • URI, acute   • Chest pain in adult   • Xerostomia   • Chronic fatigue   • Cutaneous candidiasis   • Renal calculus, left   • Screening for breast cancer   • Screening for osteoporosis   • Palpitations   • Nonrheumatic aortic valve insufficiency and mild tricuspid regurgitation   • Allergic contact dermatitis due to plants, except food   • Bilateral lower extremity edema       Social History     Tobacco Use   • Smoking status: Never Smoker   • Smokeless tobacco: Never Used   Substance Use Topics   • Alcohol use: No   • Drug use: No       Allergies   Allergen Reactions   • Latex Other (See Comments)     Blisters on hands       Current Outpatient Medications on File Prior to Visit   Medication Sig   • aspirin 81 MG EC tablet Take 81 mg by mouth daily.   • cholecalciferol (VITAMIN D3) 1000 UNITS tablet Take 1,000 Units by mouth daily.   • furosemide (LASIX) 20 MG tablet Take 1 tablet by mouth Every Other Day.   • montelukast (SINGULAIR) 10 MG tablet TAKE 1 TABLET BY MOUTH EVERY NIGHT   • Multiple Vitamin (MULTI VITAMIN DAILY PO) Take  by mouth.   • pantoprazole (PROTONIX) 40 MG EC tablet TAKE 1 TABLET BY MOUTH DAILY   • simvastatin (ZOCOR) 20 MG tablet TAKE 1 TABLET BY MOUTH EVERY NIGHT   • vitamin B-12 (CYANOCOBALAMIN) 1000 MCG tablet Take 1,000 mcg by mouth daily.   • [DISCONTINUED] ferrous sulfate 325 (65 FE) MG tablet Take 1 tablet by mouth Daily.     No current facility-administered medications on file prior to visit.         The following portions of the patient's history were reviewed and  "updated as appropriate: allergies, current medications, past family history, past medical history, past social history, past surgical history and problem list.    Review of Systems   Constitutional: Negative.   HENT: Negative.  Negative for congestion.    Eyes: Negative.    Cardiovascular: Negative.  Negative for chest pain, cyanosis, dyspnea on exertion, irregular heartbeat, leg swelling, near-syncope, orthopnea, palpitations, paroxysmal nocturnal dyspnea and syncope.   Respiratory: Negative.  Negative for shortness of breath.    Hematologic/Lymphatic: Negative.    Musculoskeletal: Negative.    Gastrointestinal: Negative.    Neurological: Negative.  Negative for headaches.          Objective:     /70 (BP Location: Left arm, Patient Position: Sitting, Cuff Size: Adult)   Pulse 71   Temp 97.9 °F (36.6 °C)   Ht 167.6 cm (66\")   Wt 69.9 kg (154 lb)   SpO2 98%   BMI 24.86 kg/m²   Pulmonary:      Effort: Pulmonary effort is normal.      Breath sounds: Normal breath sounds. No stridor. No wheezing. No rhonchi. No rales.   Cardiovascular:      PMI at left midclavicular line. Normal rate. Regular rhythm. Normal S1. Normal S2.      Murmurs: There is no murmur.      No gallop. No click. No rub.   Pulses:     Intact distal pulses.   Edema:     Peripheral edema absent.           Lab Review  Lab Results   Component Value Date     06/24/2021    K 4.4 06/24/2021     06/24/2021    BUN 19 06/24/2021    CREATININE 1.07 (H) 06/24/2021    GLUCOSE 84 06/24/2021    CALCIUM 9.5 06/24/2021    ALT 29 06/24/2021    ALKPHOS 72 06/24/2021    LABIL2 1.7 05/09/2016     Lab Results   Component Value Date    CKTOTAL 97 06/24/2021     Lab Results   Component Value Date    WBC 3.66 06/24/2021    HGB 13.2 06/24/2021    HCT 39.9 06/24/2021     06/24/2021     Lab Results   Component Value Date    INR 0.95 05/19/2014     No results found for: MG  Lab Results   Component Value Date    TSH 2.389 10/27/2016     No results " found for: BNP  Lab Results   Component Value Date    CHLPL 195 05/09/2016    CHOL 159 06/24/2021    TRIG 93 06/24/2021    HDL 54 06/24/2021    VLDL 17 06/24/2021    LDLHDL 1.60 06/24/2021     Lab Results   Component Value Date    LDL 88 06/24/2021    LDL 78 01/05/2021       Procedures       I personally viewed and interpreted the patient's LAB data         Assessment:     1. Mixed hyperlipidemia    2. Nonrheumatic aortic valve insufficiency and mild tricuspid regurgitation    3. Vitamin D deficiency    4. Environmental allergies    5. Chronic fatigue          Plan:     Hyperlipidemia  Patient was advised to continue Zocor.  Healthy lifestyle and diet discussed.  LDL was 88 last visit.  Lab work scheduled.    GERD symptoms are controlled with Protonix which was continued.    Ankle edema is better she is taking Lasix 20 mg on as needed basis.  Renal functions will be checked    She also has mild aortic insufficiency and mild to moderate tricuspid regurgitation with mild pulmonary hypertension.  Currently she is asymptomatic.  Repeat echo was discussed with the patient however because of Covid she wants to postpone that for now.  Follow-up scheduled with CBC CMP lipid panel vitamin B and vitamin D levels        No follow-ups on file.

## 2021-12-09 RX ORDER — MONTELUKAST SODIUM 10 MG/1
10 TABLET ORAL NIGHTLY
Qty: 90 TABLET | Refills: 0 | Status: SHIPPED | OUTPATIENT
Start: 2021-12-09 | End: 2022-03-09

## 2021-12-30 ENCOUNTER — LAB (OUTPATIENT)
Dept: LAB | Facility: HOSPITAL | Age: 76
End: 2021-12-30

## 2021-12-30 DIAGNOSIS — E78.2 MIXED HYPERLIPIDEMIA: ICD-10-CM

## 2021-12-30 DIAGNOSIS — E55.9 VITAMIN D DEFICIENCY: ICD-10-CM

## 2021-12-30 DIAGNOSIS — R53.82 CHRONIC FATIGUE: ICD-10-CM

## 2021-12-30 LAB
25(OH)D3 SERPL-MCNC: 49.1 NG/ML
ALBUMIN SERPL-MCNC: 4.4 G/DL (ref 3.5–5.2)
ALBUMIN/GLOB SERPL: 2.8 G/DL
ALP SERPL-CCNC: 81 U/L (ref 39–117)
ALT SERPL W P-5'-P-CCNC: 24 U/L (ref 1–33)
ANION GAP SERPL CALCULATED.3IONS-SCNC: 5.4 MMOL/L (ref 5–15)
AST SERPL-CCNC: 18 U/L (ref 1–32)
BASOPHILS # BLD AUTO: 0.04 10*3/MM3 (ref 0–0.2)
BASOPHILS NFR BLD AUTO: 1.2 % (ref 0–1.5)
BILIRUB SERPL-MCNC: 0.5 MG/DL (ref 0–1.2)
BUN SERPL-MCNC: 14 MG/DL (ref 8–23)
BUN/CREAT SERPL: 15.1 (ref 7–25)
CALCIUM SPEC-SCNC: 9.5 MG/DL (ref 8.6–10.5)
CHLORIDE SERPL-SCNC: 110 MMOL/L (ref 98–107)
CHOLEST SERPL-MCNC: 165 MG/DL (ref 0–200)
CK SERPL-CCNC: 110 U/L (ref 20–180)
CO2 SERPL-SCNC: 27.6 MMOL/L (ref 22–29)
CREAT SERPL-MCNC: 0.93 MG/DL (ref 0.57–1)
DEPRECATED RDW RBC AUTO: 41.8 FL (ref 37–54)
EOSINOPHIL # BLD AUTO: 0.13 10*3/MM3 (ref 0–0.4)
EOSINOPHIL NFR BLD AUTO: 3.9 % (ref 0.3–6.2)
ERYTHROCYTE [DISTWIDTH] IN BLOOD BY AUTOMATED COUNT: 12.2 % (ref 12.3–15.4)
GFR SERPL CREATININE-BSD FRML MDRD: 58 ML/MIN/1.73
GLOBULIN UR ELPH-MCNC: 1.6 GM/DL
GLUCOSE SERPL-MCNC: 95 MG/DL (ref 65–99)
HCT VFR BLD AUTO: 40.3 % (ref 34–46.6)
HDLC SERPL-MCNC: 54 MG/DL (ref 40–60)
HGB BLD-MCNC: 13.1 G/DL (ref 12–15.9)
IMM GRANULOCYTES # BLD AUTO: 0.01 10*3/MM3 (ref 0–0.05)
IMM GRANULOCYTES NFR BLD AUTO: 0.3 % (ref 0–0.5)
LDLC SERPL CALC-MCNC: 90 MG/DL (ref 0–100)
LDLC/HDLC SERPL: 1.63 {RATIO}
LYMPHOCYTES # BLD AUTO: 1.21 10*3/MM3 (ref 0.7–3.1)
LYMPHOCYTES NFR BLD AUTO: 36.7 % (ref 19.6–45.3)
MCH RBC QN AUTO: 30.3 PG (ref 26.6–33)
MCHC RBC AUTO-ENTMCNC: 32.5 G/DL (ref 31.5–35.7)
MCV RBC AUTO: 93.3 FL (ref 79–97)
MONOCYTES # BLD AUTO: 0.37 10*3/MM3 (ref 0.1–0.9)
MONOCYTES NFR BLD AUTO: 11.2 % (ref 5–12)
NEUTROPHILS NFR BLD AUTO: 1.54 10*3/MM3 (ref 1.7–7)
NEUTROPHILS NFR BLD AUTO: 46.7 % (ref 42.7–76)
NRBC BLD AUTO-RTO: 0 /100 WBC (ref 0–0.2)
PLATELET # BLD AUTO: 213 10*3/MM3 (ref 140–450)
PMV BLD AUTO: 10.8 FL (ref 6–12)
POTASSIUM SERPL-SCNC: 4.7 MMOL/L (ref 3.5–5.2)
PROT SERPL-MCNC: 6 G/DL (ref 6–8.5)
RBC # BLD AUTO: 4.32 10*6/MM3 (ref 3.77–5.28)
SODIUM SERPL-SCNC: 143 MMOL/L (ref 136–145)
TRIGL SERPL-MCNC: 115 MG/DL (ref 0–150)
VIT B12 BLD-MCNC: 1153 PG/ML (ref 211–946)
VLDLC SERPL-MCNC: 21 MG/DL (ref 5–40)
WBC NRBC COR # BLD: 3.3 10*3/MM3 (ref 3.4–10.8)

## 2021-12-30 PROCEDURE — 36415 COLL VENOUS BLD VENIPUNCTURE: CPT

## 2021-12-30 PROCEDURE — 82607 VITAMIN B-12: CPT

## 2021-12-30 PROCEDURE — 82550 ASSAY OF CK (CPK): CPT

## 2021-12-30 PROCEDURE — 80053 COMPREHEN METABOLIC PANEL: CPT

## 2021-12-30 PROCEDURE — 82306 VITAMIN D 25 HYDROXY: CPT

## 2021-12-30 PROCEDURE — 85025 COMPLETE CBC W/AUTO DIFF WBC: CPT

## 2021-12-30 PROCEDURE — 80061 LIPID PANEL: CPT

## 2022-01-06 ENCOUNTER — OFFICE VISIT (OUTPATIENT)
Dept: CARDIOLOGY | Facility: CLINIC | Age: 77
End: 2022-01-06

## 2022-01-06 VITALS
HEART RATE: 92 BPM | OXYGEN SATURATION: 98 % | DIASTOLIC BLOOD PRESSURE: 80 MMHG | TEMPERATURE: 97.1 F | BODY MASS INDEX: 24.75 KG/M2 | SYSTOLIC BLOOD PRESSURE: 126 MMHG | WEIGHT: 154 LBS | HEIGHT: 66 IN

## 2022-01-06 DIAGNOSIS — M15.3 SECONDARY MULTIPLE ARTHRITIS: ICD-10-CM

## 2022-01-06 DIAGNOSIS — M81.0 AGE-RELATED OSTEOPOROSIS WITHOUT CURRENT PATHOLOGICAL FRACTURE: ICD-10-CM

## 2022-01-06 DIAGNOSIS — E78.2 MIXED HYPERLIPIDEMIA: Primary | ICD-10-CM

## 2022-01-06 DIAGNOSIS — E55.9 VITAMIN D DEFICIENCY: ICD-10-CM

## 2022-01-06 DIAGNOSIS — M89.9 DISEASE OF BONE: ICD-10-CM

## 2022-01-06 DIAGNOSIS — Z78.0 POST-MENOPAUSAL: ICD-10-CM

## 2022-01-06 DIAGNOSIS — K21.9 GASTROESOPHAGEAL REFLUX DISEASE WITHOUT ESOPHAGITIS: ICD-10-CM

## 2022-01-06 DIAGNOSIS — I35.1 NONRHEUMATIC AORTIC VALVE INSUFFICIENCY: ICD-10-CM

## 2022-01-06 PROCEDURE — 99214 OFFICE O/P EST MOD 30 MIN: CPT | Performed by: INTERNAL MEDICINE

## 2022-01-06 RX ORDER — ATORVASTATIN CALCIUM 20 MG/1
20 TABLET, FILM COATED ORAL DAILY
Qty: 90 TABLET | Refills: 2 | Status: SHIPPED | OUTPATIENT
Start: 2022-01-06 | End: 2022-10-06 | Stop reason: SDUPTHER

## 2022-01-06 NOTE — PROGRESS NOTES
subjective     Chief Complaint   Patient presents with   • Results     labs   • Hyperlipidemia     Follow up     • Leg Swelling     Follow up  pt states swelling has improved     History of Present Illness  Patient is 76 years old white female who is here for follow-up because of multiple chronic medical problems.    She states that she is doing very well.    She has hyperlipidemia and has been taking Zocor 20 mg daily, no drug side effects but insurance coverage has changed it is better for her to have Lipitor which will be switched.    GERD symptoms are very well controlled with the Protonix.  Multiple environmental allergies patient is taking Singulair and is doing very well.  Patient also has history of aortic regurgitation without any decompensation.  Denies denies any chest pain palpitations or shortness of breath.  She does have  mild lower extremity edema and is taking Lasix on as needed basis.    Past Surgical History:   Procedure Laterality Date   • CARDIAC CATHETERIZATION  2014   • CARDIOVASCULAR STRESS TEST  2014   • CATARACT EXTRACTION, BILATERAL  09/2021   • COLONOSCOPY     • ECHO - CONVERTED  2014   • ENDOSCOPY  2015   • EXTRACORPOREAL SHOCK WAVE LITHOTRIPSY (ESWL) Left 9/13/2019    Procedure: EXTRACORPOREAL SHOCKWAVE LITHOTRIPSY;  Surgeon: Demarco Aldana MD;  Location: [facility];  Service: Urology   • HYSTERECTOMY  2002    benign   • LAPAROSCOPIC CHOLECYSTECTOMY  2001     Family History   Problem Relation Age of Onset   • Heart attack Mother    • Heart failure Mother    • Hypertension Mother    • Heart attack Father    • Hypertension Father    • Heart block Brother    • Heart block Sister    • Heart block Sister    • Heart attack Brother    • Heart block Brother    • Stroke Brother    • Breast cancer Neg Hx      Past Medical History:   Diagnosis Date   • Asthma    • Elevated cholesterol    • Essential hypertension 9/24/2020   • GERD (gastroesophageal reflux disease)    • Hyperlipidemia    •  Kidney stones    • Osteoporosis    • Tongue irritation    • Weight loss      Patient Active Problem List   Diagnosis   • Mixed hyperlipidemia   • Osteoporosis   • Ganglion cyst   • Gastroesophageal reflux disease without esophagitis   • Vitamin D deficiency   • Continuous leakage of urine   • Cough   • Environmental allergies   • URI, acute   • Chest pain in adult   • Xerostomia   • Chronic fatigue   • Cutaneous candidiasis   • Renal calculus, left   • Screening for breast cancer   • Screening for osteoporosis   • Palpitations   • Nonrheumatic aortic valve insufficiency and mild tricuspid regurgitation   • Allergic contact dermatitis due to plants, except food   • Bilateral lower extremity edema       Social History     Tobacco Use   • Smoking status: Never Smoker   • Smokeless tobacco: Never Used   Substance Use Topics   • Alcohol use: No   • Drug use: No       Allergies   Allergen Reactions   • Latex Other (See Comments)     Blisters on hands       Current Outpatient Medications on File Prior to Visit   Medication Sig   • aspirin 81 MG EC tablet Take 81 mg by mouth daily.   • cholecalciferol (VITAMIN D3) 1000 UNITS tablet Take 1,000 Units by mouth daily.   • furosemide (LASIX) 20 MG tablet Take 1 tablet by mouth Every Other Day. (Patient taking differently: Take 20 mg by mouth Every Other Day As Needed.)   • montelukast (SINGULAIR) 10 MG tablet TAKE 1 TABLET BY MOUTH EVERY NIGHT   • Multiple Vitamin (MULTI VITAMIN DAILY PO) Take  by mouth.   • pantoprazole (PROTONIX) 40 MG EC tablet TAKE 1 TABLET BY MOUTH DAILY   • vitamin B-12 (CYANOCOBALAMIN) 1000 MCG tablet Take 1,000 mcg by mouth daily.   • [DISCONTINUED] simvastatin (ZOCOR) 20 MG tablet TAKE 1 TABLET BY MOUTH EVERY NIGHT     No current facility-administered medications on file prior to visit.         The following portions of the patient's history were reviewed and updated as appropriate: allergies, current medications, past family history, past medical  "history, past social history, past surgical history and problem list.    Review of Systems   Constitutional: Negative.   HENT: Negative.  Negative for congestion.    Eyes: Negative.    Cardiovascular: Negative.  Negative for chest pain, cyanosis, dyspnea on exertion, irregular heartbeat, leg swelling, near-syncope, orthopnea, palpitations, paroxysmal nocturnal dyspnea and syncope.   Respiratory: Negative.  Negative for shortness of breath.    Hematologic/Lymphatic: Negative.    Musculoskeletal: Negative.    Gastrointestinal: Negative.    Neurological: Negative.  Negative for headaches.          Objective:     /80 (BP Location: Left arm, Patient Position: Sitting, Cuff Size: Adult)   Pulse 92   Temp 97.1 °F (36.2 °C)   Ht 167.6 cm (66\")   Wt 69.9 kg (154 lb)   SpO2 98%   BMI 24.86 kg/m²   Pulmonary:      Effort: Pulmonary effort is normal.      Breath sounds: Normal breath sounds. No stridor. No wheezing. No rhonchi. No rales.   Cardiovascular:      PMI at left midclavicular line. Normal rate. Regular rhythm. Normal S1. Normal S2.      Murmurs: There is no murmur.      No gallop. No click. No rub.   Pulses:     Intact distal pulses.   Edema:     Peripheral edema absent.           Lab Review  Lab Results   Component Value Date     12/30/2021    K 4.7 12/30/2021     (H) 12/30/2021    BUN 14 12/30/2021    CREATININE 0.93 12/30/2021    GLUCOSE 95 12/30/2021    CALCIUM 9.5 12/30/2021    ALT 24 12/30/2021    ALKPHOS 81 12/30/2021    LABIL2 1.7 05/09/2016     Lab Results   Component Value Date    CKTOTAL 110 12/30/2021     Lab Results   Component Value Date    WBC 3.30 (L) 12/30/2021    HGB 13.1 12/30/2021    HCT 40.3 12/30/2021     12/30/2021     Lab Results   Component Value Date    INR 0.95 05/19/2014     No results found for: MG  Lab Results   Component Value Date    TSH 2.389 10/27/2016     No results found for: BNP  Lab Results   Component Value Date    CHLPL 195 05/09/2016    CHOL 165 " 12/30/2021    TRIG 115 12/30/2021    HDL 54 12/30/2021    VLDL 21 12/30/2021    LDLHDL 1.63 12/30/2021     Lab Results   Component Value Date    LDL 90 12/30/2021    LDL 88 06/24/2021       Procedures       I personally viewed and interpreted the patient's LAB data         Assessment:     1. Mixed hyperlipidemia    2. Disease of bone    3. Secondary multiple arthritis    4. Post-menopausal    5. Nonrheumatic aortic valve insufficiency and mild tricuspid regurgitation    6. Gastroesophageal reflux disease without esophagitis    7. Age-related osteoporosis without current pathological fracture    8. Vitamin D deficiency          Plan:     Hyperlipidemia  Very well controlled however will discontinue Zocor and start atorvastatin 20 mg daily because of insurance reasons.    Healthy lifestyle emphasized.    Mild lower extremity edema Lasix 20 mg every other day as needed was continued.  GERD symptoms are controlled with Protonix which was continued.    Patient was advised to continue vitamin D and calcium we will get DEXA scan for further evaluation.  Follow-up scheduled        No follow-ups on file.

## 2022-02-11 ENCOUNTER — HOSPITAL ENCOUNTER (OUTPATIENT)
Dept: MAMMOGRAPHY | Facility: HOSPITAL | Age: 77
Discharge: HOME | End: 2022-02-11

## 2022-02-11 ENCOUNTER — HOSPITAL ENCOUNTER (OUTPATIENT)
Dept: BONE DENSITY | Facility: HOSPITAL | Age: 77
Discharge: HOME | End: 2022-02-11

## 2022-02-11 DIAGNOSIS — Z78.0 POST-MENOPAUSAL: ICD-10-CM

## 2022-02-11 DIAGNOSIS — Z12.31 VISIT FOR SCREENING MAMMOGRAM: ICD-10-CM

## 2022-02-11 DIAGNOSIS — M89.9 DISEASE OF BONE: ICD-10-CM

## 2022-02-11 DIAGNOSIS — M15.3 SECONDARY MULTIPLE ARTHRITIS: ICD-10-CM

## 2022-02-11 PROCEDURE — 77080 DXA BONE DENSITY AXIAL: CPT | Performed by: RADIOLOGY

## 2022-02-11 PROCEDURE — 77080 DXA BONE DENSITY AXIAL: CPT

## 2022-02-11 PROCEDURE — 77063 BREAST TOMOSYNTHESIS BI: CPT

## 2022-02-11 PROCEDURE — 77063 BREAST TOMOSYNTHESIS BI: CPT | Performed by: RADIOLOGY

## 2022-02-11 PROCEDURE — 77067 SCR MAMMO BI INCL CAD: CPT | Performed by: RADIOLOGY

## 2022-02-11 PROCEDURE — 77067 SCR MAMMO BI INCL CAD: CPT

## 2022-02-14 ENCOUNTER — TELEPHONE (OUTPATIENT)
Dept: CARDIOLOGY | Facility: CLINIC | Age: 77
End: 2022-02-14

## 2022-02-14 RX ORDER — ALENDRONATE SODIUM 70 MG/1
70 TABLET ORAL
Qty: 13 TABLET | Refills: 0 | Status: SHIPPED | OUTPATIENT
Start: 2022-02-14 | End: 2022-03-24

## 2022-02-14 NOTE — PROGRESS NOTES
DEXA scan shows osteopenia which is slightly worse than last test.Continue vitamin D and add Fosamax 70 weekly  Call in a prescription if she agrees.

## 2022-02-14 NOTE — TELEPHONE ENCOUNTER
----- Message from Víctor Rodrigues MD sent at 2/14/2022  9:41 AM EST -----  DEXA scan shows osteopenia which is slightly worse than last test.Continue vitamin D and add Fosamax 70 weekly  Call in a prescription if she agrees.

## 2022-03-09 RX ORDER — PANTOPRAZOLE SODIUM 40 MG/1
40 TABLET, DELAYED RELEASE ORAL DAILY
Qty: 90 TABLET | Refills: 3 | Status: SHIPPED | OUTPATIENT
Start: 2022-03-09 | End: 2022-04-07

## 2022-03-09 RX ORDER — MONTELUKAST SODIUM 10 MG/1
10 TABLET ORAL NIGHTLY
Qty: 90 TABLET | Refills: 0 | Status: SHIPPED | OUTPATIENT
Start: 2022-03-09 | End: 2022-06-07

## 2022-03-24 RX ORDER — ALENDRONATE SODIUM 70 MG/1
TABLET ORAL
Qty: 12 TABLET | Refills: 1 | Status: SHIPPED | OUTPATIENT
Start: 2022-03-24 | End: 2022-09-22

## 2022-04-07 ENCOUNTER — OFFICE VISIT (OUTPATIENT)
Dept: CARDIOLOGY | Facility: CLINIC | Age: 77
End: 2022-04-07

## 2022-04-07 VITALS
SYSTOLIC BLOOD PRESSURE: 126 MMHG | OXYGEN SATURATION: 99 % | WEIGHT: 154 LBS | HEART RATE: 69 BPM | HEIGHT: 66 IN | TEMPERATURE: 97.5 F | BODY MASS INDEX: 24.75 KG/M2 | DIASTOLIC BLOOD PRESSURE: 76 MMHG

## 2022-04-07 DIAGNOSIS — R00.2 PALPITATIONS: ICD-10-CM

## 2022-04-07 DIAGNOSIS — E78.2 MIXED HYPERLIPIDEMIA: Primary | ICD-10-CM

## 2022-04-07 DIAGNOSIS — R60.0 BILATERAL LOWER EXTREMITY EDEMA: ICD-10-CM

## 2022-04-07 DIAGNOSIS — I35.1 NONRHEUMATIC AORTIC VALVE INSUFFICIENCY: ICD-10-CM

## 2022-04-07 PROCEDURE — 99214 OFFICE O/P EST MOD 30 MIN: CPT | Performed by: INTERNAL MEDICINE

## 2022-04-07 RX ORDER — OMEPRAZOLE 40 MG/1
40 CAPSULE, DELAYED RELEASE ORAL DAILY
Qty: 90 CAPSULE | Refills: 0 | Status: SHIPPED | OUTPATIENT
Start: 2022-04-07 | End: 2022-07-21

## 2022-04-07 RX ORDER — SIMVASTATIN 20 MG
20 TABLET ORAL NIGHTLY
COMMUNITY
Start: 2022-03-13 | End: 2022-04-07

## 2022-04-07 RX ORDER — FUROSEMIDE 20 MG/1
20 TABLET ORAL
Qty: 45 TABLET | Refills: 2 | Status: SHIPPED | OUTPATIENT
Start: 2022-04-07

## 2022-04-07 NOTE — PROGRESS NOTES
subjective     Chief Complaint   Patient presents with   • Edema     Follow up Bilat LE  improved per patient   • Results     Dexa scan and mammo   • Heartburn     Pt wanting to change from protonix to something different     History of Present Illness  Patient is 76 years old white female who is here for follow-up.  Patient has osteopenia has been taking Fosamax and had DEXA scan which will be discussed.  Mammogram was also done and will review the results.      Patient has hyperlipidemia on Lipitor therapy.  Mild bilateral lower extremity edema.  She is taking Lasix every other day as needed.    Patient has GERD and has been taking Protonix which has been Expensive  It to be switched to omeprazole.  Side effects of proton pump inhibitors were discussed.  Palpitations are controlled.  She has mild aortic insufficiency and tricuspid regurgitation by history clinically no evidence of heart failure.    Past Surgical History:   Procedure Laterality Date   • CARDIAC CATHETERIZATION  2014   • CARDIOVASCULAR STRESS TEST  2014   • CATARACT EXTRACTION, BILATERAL  09/2021   • COLONOSCOPY     • ECHO - CONVERTED  2014   • ENDOSCOPY  2015   • EXTRACORPOREAL SHOCK WAVE LITHOTRIPSY (ESWL) Left 9/13/2019    Procedure: EXTRACORPOREAL SHOCKWAVE LITHOTRIPSY;  Surgeon: Demarco Aldana MD;  Location: [facility];  Service: Urology   • HYSTERECTOMY  2002    benign   • LAPAROSCOPIC CHOLECYSTECTOMY  2001     Family History   Problem Relation Age of Onset   • Heart attack Mother    • Heart failure Mother    • Hypertension Mother    • Heart attack Father    • Hypertension Father    • Heart block Brother    • Heart block Sister    • Heart block Sister    • Heart attack Brother    • Heart block Brother    • Stroke Brother    • Breast cancer Neg Hx      Past Medical History:   Diagnosis Date   • Asthma    • Elevated cholesterol    • Essential hypertension 9/24/2020   • GERD (gastroesophageal reflux disease)    • Hyperlipidemia    •  Kidney stones    • Osteoporosis    • Tongue irritation    • Weight loss      Patient Active Problem List   Diagnosis   • Mixed hyperlipidemia   • Osteoporosis   • Ganglion cyst   • Gastroesophageal reflux disease without esophagitis   • Vitamin D deficiency   • Continuous leakage of urine   • Cough   • Environmental allergies   • URI, acute   • Chest pain in adult   • Xerostomia   • Chronic fatigue   • Cutaneous candidiasis   • Renal calculus, left   • Screening for breast cancer   • Screening for osteoporosis   • Palpitations   • Nonrheumatic aortic valve insufficiency and mild tricuspid regurgitation   • Allergic contact dermatitis due to plants, except food   • Bilateral lower extremity edema       Social History     Tobacco Use   • Smoking status: Never Smoker   • Smokeless tobacco: Never Used   Substance Use Topics   • Alcohol use: No   • Drug use: No       Allergies   Allergen Reactions   • Latex Other (See Comments)     Blisters on hands       Current Outpatient Medications on File Prior to Visit   Medication Sig   • alendronate (FOSAMAX) 70 MG tablet TAKE 1 TABLET BY MOUTH EVERY 7 DAYS   • aspirin 81 MG EC tablet Take 81 mg by mouth daily.   • atorvastatin (LIPITOR) 20 MG tablet Take 1 tablet by mouth Daily.   • cholecalciferol (VITAMIN D3) 1000 UNITS tablet Take 1,000 Units by mouth daily.   • montelukast (SINGULAIR) 10 MG tablet TAKE 1 TABLET BY MOUTH EVERY NIGHT   • Multiple Vitamin (MULTI VITAMIN DAILY PO) Take  by mouth.   • vitamin B-12 (CYANOCOBALAMIN) 1000 MCG tablet Take 1,000 mcg by mouth daily.   • [DISCONTINUED] furosemide (LASIX) 20 MG tablet Take 1 tablet by mouth Every Other Day. (Patient taking differently: Take 20 mg by mouth Every Other Day As Needed.)   • [DISCONTINUED] pantoprazole (PROTONIX) 40 MG EC tablet TAKE 1 TABLET BY MOUTH DAILY   • [DISCONTINUED] simvastatin (ZOCOR) 20 MG tablet Take 20 mg by mouth Every Night.     No current facility-administered medications on file prior to  "visit.         The following portions of the patient's history were reviewed and updated as appropriate: allergies, current medications, past family history, past medical history, past social history, past surgical history and problem list.    Review of Systems   Constitutional: Negative.   HENT: Negative.  Negative for congestion.    Eyes: Negative.    Cardiovascular: Positive for leg swelling. Negative for chest pain, cyanosis, dyspnea on exertion, irregular heartbeat, near-syncope, orthopnea, palpitations, paroxysmal nocturnal dyspnea and syncope.   Respiratory: Negative.  Negative for shortness of breath.    Hematologic/Lymphatic: Negative.    Musculoskeletal: Negative.    Gastrointestinal: Positive for heartburn.   Neurological: Negative.  Negative for headaches.          Objective:     /76 (BP Location: Left arm, Patient Position: Sitting, Cuff Size: Adult)   Pulse 69   Temp 97.5 °F (36.4 °C)   Ht 167.6 cm (66\")   Wt 69.9 kg (154 lb)   SpO2 99%   BMI 24.86 kg/m²   Pulmonary:      Effort: Pulmonary effort is normal.      Breath sounds: Normal breath sounds. No wheezing. No rhonchi. No rales.   Cardiovascular:      PMI at left midclavicular line. Normal rate. Regular rhythm. Normal S1. Normal S2.      Murmurs: There is no murmur.      No gallop. No click. No rub.   Pulses:     Intact distal pulses.   Edema:     Peripheral edema absent.           Lab Review  Lab Results   Component Value Date     12/30/2021    K 4.7 12/30/2021     (H) 12/30/2021    BUN 14 12/30/2021    CREATININE 0.93 12/30/2021    GLUCOSE 95 12/30/2021    CALCIUM 9.5 12/30/2021    ALT 24 12/30/2021    ALKPHOS 81 12/30/2021    LABIL2 1.7 05/09/2016     Lab Results   Component Value Date    CKTOTAL 110 12/30/2021     Lab Results   Component Value Date    WBC 3.30 (L) 12/30/2021    HGB 13.1 12/30/2021    HCT 40.3 12/30/2021     12/30/2021     Lab Results   Component Value Date    INR 0.95 05/19/2014     No results " found for: MG  Lab Results   Component Value Date    TSH 2.389 10/27/2016     No results found for: BNP  Lab Results   Component Value Date    CHLPL 195 05/09/2016    CHOL 165 12/30/2021    TRIG 115 12/30/2021    HDL 54 12/30/2021    VLDL 21 12/30/2021    LDLHDL 1.63 12/30/2021     Lab Results   Component Value Date    LDL 90 12/30/2021    LDL 88 06/24/2021       Procedures       I personally viewed and interpreted the patient's LAB data         Assessment:     1. Mixed hyperlipidemia    2. Nonrheumatic aortic valve insufficiency and mild tricuspid regurgitation    3. Palpitations    4. Bilateral lower extremity edema          Plan:        Patient has hyperlipidemia she was advised to continue statin therapy with Lipitor lab work scheduled for next visit.  Healthy lifestyle and diet exercise discussed.    Aortic insufficiency and mild tricuspid regurgitation clinically asymptomatic we will continue to observe.  Palpitations are controlled she is not requiring any medications.  Bilateral lower extremity edema patient will continue Lasix 20 every 48 hours on as needed basis.    GERD  Protonix was switched to omeprazole.  Side effects of proton pump inhibitors including gastric cancer magnesium deficiency discussed.  Follow-up scheduled      No follow-ups on file.

## 2022-06-07 RX ORDER — MONTELUKAST SODIUM 10 MG/1
10 TABLET ORAL NIGHTLY
Qty: 90 TABLET | Refills: 0 | Status: SHIPPED | OUTPATIENT
Start: 2022-06-07 | End: 2022-09-06

## 2022-07-01 ENCOUNTER — LAB (OUTPATIENT)
Dept: LAB | Facility: HOSPITAL | Age: 77
End: 2022-07-01

## 2022-07-01 DIAGNOSIS — E78.2 MIXED HYPERLIPIDEMIA: ICD-10-CM

## 2022-07-01 LAB
ALBUMIN SERPL-MCNC: 4.1 G/DL (ref 3.5–5.2)
ALBUMIN/GLOB SERPL: 2.3 G/DL
ALP SERPL-CCNC: 66 U/L (ref 39–117)
ALT SERPL W P-5'-P-CCNC: 24 U/L (ref 1–33)
ANION GAP SERPL CALCULATED.3IONS-SCNC: 9 MMOL/L (ref 5–15)
AST SERPL-CCNC: 17 U/L (ref 1–32)
BASOPHILS # BLD AUTO: 0.03 10*3/MM3 (ref 0–0.2)
BASOPHILS NFR BLD AUTO: 0.8 % (ref 0–1.5)
BILIRUB SERPL-MCNC: 0.6 MG/DL (ref 0–1.2)
BUN SERPL-MCNC: 15 MG/DL (ref 8–23)
BUN/CREAT SERPL: 16.9 (ref 7–25)
CALCIUM SPEC-SCNC: 9.2 MG/DL (ref 8.6–10.5)
CHLORIDE SERPL-SCNC: 105 MMOL/L (ref 98–107)
CHOLEST SERPL-MCNC: 144 MG/DL (ref 0–200)
CK SERPL-CCNC: 127 U/L (ref 20–180)
CO2 SERPL-SCNC: 26 MMOL/L (ref 22–29)
CREAT SERPL-MCNC: 0.89 MG/DL (ref 0.57–1)
DEPRECATED RDW RBC AUTO: 39.9 FL (ref 37–54)
EGFRCR SERPLBLD CKD-EPI 2021: 66.5 ML/MIN/1.73
EOSINOPHIL # BLD AUTO: 0.18 10*3/MM3 (ref 0–0.4)
EOSINOPHIL NFR BLD AUTO: 4.9 % (ref 0.3–6.2)
ERYTHROCYTE [DISTWIDTH] IN BLOOD BY AUTOMATED COUNT: 12.3 % (ref 12.3–15.4)
GLOBULIN UR ELPH-MCNC: 1.8 GM/DL
GLUCOSE SERPL-MCNC: 79 MG/DL (ref 65–99)
HCT VFR BLD AUTO: 39.2 % (ref 34–46.6)
HDLC SERPL-MCNC: 52 MG/DL (ref 40–60)
HGB BLD-MCNC: 13.3 G/DL (ref 12–15.9)
IMM GRANULOCYTES # BLD AUTO: 0.01 10*3/MM3 (ref 0–0.05)
IMM GRANULOCYTES NFR BLD AUTO: 0.3 % (ref 0–0.5)
LDLC SERPL CALC-MCNC: 74 MG/DL (ref 0–100)
LDLC/HDLC SERPL: 1.4 {RATIO}
LYMPHOCYTES # BLD AUTO: 1.28 10*3/MM3 (ref 0.7–3.1)
LYMPHOCYTES NFR BLD AUTO: 34.8 % (ref 19.6–45.3)
MCH RBC QN AUTO: 30.7 PG (ref 26.6–33)
MCHC RBC AUTO-ENTMCNC: 33.9 G/DL (ref 31.5–35.7)
MCV RBC AUTO: 90.5 FL (ref 79–97)
MONOCYTES # BLD AUTO: 0.36 10*3/MM3 (ref 0.1–0.9)
MONOCYTES NFR BLD AUTO: 9.8 % (ref 5–12)
NEUTROPHILS NFR BLD AUTO: 1.82 10*3/MM3 (ref 1.7–7)
NEUTROPHILS NFR BLD AUTO: 49.4 % (ref 42.7–76)
NRBC BLD AUTO-RTO: 0 /100 WBC (ref 0–0.2)
PLATELET # BLD AUTO: 216 10*3/MM3 (ref 140–450)
PMV BLD AUTO: 10.8 FL (ref 6–12)
POTASSIUM SERPL-SCNC: 4.4 MMOL/L (ref 3.5–5.2)
PROT SERPL-MCNC: 5.9 G/DL (ref 6–8.5)
RBC # BLD AUTO: 4.33 10*6/MM3 (ref 3.77–5.28)
SODIUM SERPL-SCNC: 140 MMOL/L (ref 136–145)
TRIGL SERPL-MCNC: 95 MG/DL (ref 0–150)
VLDLC SERPL-MCNC: 18 MG/DL (ref 5–40)
WBC NRBC COR # BLD: 3.68 10*3/MM3 (ref 3.4–10.8)

## 2022-07-01 PROCEDURE — 82550 ASSAY OF CK (CPK): CPT

## 2022-07-01 PROCEDURE — 80053 COMPREHEN METABOLIC PANEL: CPT

## 2022-07-01 PROCEDURE — 85025 COMPLETE CBC W/AUTO DIFF WBC: CPT

## 2022-07-01 PROCEDURE — 80061 LIPID PANEL: CPT

## 2022-07-01 PROCEDURE — 36415 COLL VENOUS BLD VENIPUNCTURE: CPT

## 2022-07-07 ENCOUNTER — OFFICE VISIT (OUTPATIENT)
Dept: CARDIOLOGY | Facility: CLINIC | Age: 77
End: 2022-07-07

## 2022-07-07 VITALS
SYSTOLIC BLOOD PRESSURE: 122 MMHG | HEIGHT: 66 IN | BODY MASS INDEX: 24.59 KG/M2 | OXYGEN SATURATION: 98 % | HEART RATE: 77 BPM | TEMPERATURE: 97.1 F | DIASTOLIC BLOOD PRESSURE: 68 MMHG | WEIGHT: 153 LBS

## 2022-07-07 DIAGNOSIS — S20.469A TICK BITE OF BACK WALL OF THORAX, UNSPECIFIED LOCATION, INITIAL ENCOUNTER: ICD-10-CM

## 2022-07-07 DIAGNOSIS — E78.2 MIXED HYPERLIPIDEMIA: ICD-10-CM
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
## 2022-07-07 DIAGNOSIS — I35.1 NONRHEUMATIC AORTIC VALVE INSUFFICIENCY: Primary | ICD-10-CM

## 2022-07-07 PROCEDURE — 99214 OFFICE O/P EST MOD 30 MIN: CPT | Performed by: INTERNAL MEDICINE

## 2022-07-07 RX ORDER — DOXYCYCLINE HYCLATE 100 MG
100 TABLET ORAL 2 TIMES DAILY
Qty: 20 TABLET | Refills: 0 | Status: SHIPPED | OUTPATIENT
Start: 2022-07-07 | End: 2022-10-06

## 2022-07-07 RX ORDER — SIMVASTATIN 20 MG
20 TABLET ORAL NIGHTLY
COMMUNITY
Start: 2022-06-11 | End: 2022-07-07 | Stop reason: ALTCHOICE

## 2022-07-07 NOTE — PROGRESS NOTES
subjective     Chief Complaint   Patient presents with   • Results     Labs     • Tick Removal     On mid back since end of may   • Hyperlipidemia     Follow up     History of Present Illness  Patient is 77 years old white female who is here for follow-up.  She has multiple chronic medical problems but states that she is doing very well.  She has history of hyperlipidemia on Lipitor therapy  No drug side effects.    GERD controlled with Prilosec.  Recently patient had Tic bite on her mid back area. The area is red swollen, will start doxycycline.    She had lab work done which will be reviewed.    Past Surgical History:   Procedure Laterality Date   • CARDIAC CATHETERIZATION  2014   • CARDIOVASCULAR STRESS TEST  2014   • CATARACT EXTRACTION, BILATERAL  09/2021   • COLONOSCOPY     • ECHO - CONVERTED  2014   • ENDOSCOPY  2015   • EXTRACORPOREAL SHOCK WAVE LITHOTRIPSY (ESWL) Left 9/13/2019    Procedure: EXTRACORPOREAL SHOCKWAVE LITHOTRIPSY;  Surgeon: Demarco Aldana MD;  Location: [facility];  Service: Urology   • HYSTERECTOMY  2002    benign   • LAPAROSCOPIC CHOLECYSTECTOMY  2001     Family History   Problem Relation Age of Onset   • Heart attack Mother    • Heart failure Mother    • Hypertension Mother    • Heart attack Father    • Hypertension Father    • Heart block Brother    • Heart block Sister    • Heart block Sister    • Heart attack Brother    • Heart block Brother    • Stroke Brother    • Breast cancer Neg Hx      Past Medical History:   Diagnosis Date   • Asthma    • Elevated cholesterol    • Essential hypertension 9/24/2020   • GERD (gastroesophageal reflux disease)    • Hyperlipidemia    • Kidney stones    • Osteoporosis    • Tongue irritation    • Weight loss      Patient Active Problem List   Diagnosis   • Mixed hyperlipidemia   • Osteoporosis   • Ganglion cyst   • Gastroesophageal reflux disease without esophagitis   • Vitamin D deficiency   • Continuous leakage of urine   • Cough   •  Environmental allergies   • URI, acute   • Chest pain in adult   • Xerostomia   • Chronic fatigue   • Cutaneous candidiasis   • Renal calculus, left   • Screening for breast cancer   • Screening for osteoporosis   • Palpitations   • Nonrheumatic mild aortic valve insufficiency and mild tricuspid regurgitation 2018.   • Allergic contact dermatitis due to plants, except food   • Bilateral lower extremity edema   • Tick bite of thoracic wall       Social History     Tobacco Use   • Smoking status: Never Smoker   • Smokeless tobacco: Never Used   Substance Use Topics   • Alcohol use: No   • Drug use: No       Allergies   Allergen Reactions   • Latex Other (See Comments)     Blisters on hands       Current Outpatient Medications on File Prior to Visit   Medication Sig   • alendronate (FOSAMAX) 70 MG tablet TAKE 1 TABLET BY MOUTH EVERY 7 DAYS   • aspirin 81 MG EC tablet Take 81 mg by mouth daily.   • atorvastatin (LIPITOR) 20 MG tablet Take 1 tablet by mouth Daily.   • cholecalciferol (VITAMIN D3) 1000 UNITS tablet Take 1,000 Units by mouth daily.   • furosemide (LASIX) 20 MG tablet Take 1 tablet by mouth Every Other Day As Needed (edema).   • montelukast (SINGULAIR) 10 MG tablet TAKE 1 TABLET BY MOUTH EVERY NIGHT   • Multiple Vitamin (MULTI VITAMIN DAILY PO) Take  by mouth.   • omeprazole (priLOSEC) 40 MG capsule Take 1 capsule by mouth Daily.   • vitamin B-12 (CYANOCOBALAMIN) 1000 MCG tablet Take 1,000 mcg by mouth daily.     No current facility-administered medications on file prior to visit.         The following portions of the patient's history were reviewed and updated as appropriate: allergies, current medications, past family history, past medical history, past social history, past surgical history and problem list.    Review of Systems   Constitutional: Negative.   HENT: Negative.  Negative for congestion.    Eyes: Negative.    Cardiovascular: Negative.  Negative for chest pain, cyanosis, dyspnea on exertion,  "irregular heartbeat, leg swelling, near-syncope, orthopnea, palpitations, paroxysmal nocturnal dyspnea and syncope.   Respiratory: Negative.  Negative for shortness of breath.    Hematologic/Lymphatic: Negative.    Skin:        Tick bite   Musculoskeletal: Negative.    Gastrointestinal: Negative.    Neurological: Negative.  Negative for headaches.          Objective:     /68 (BP Location: Left arm, Patient Position: Sitting, Cuff Size: Adult)   Pulse 77   Temp 97.1 °F (36.2 °C)   Ht 167.6 cm (66\")   Wt 69.4 kg (153 lb)   SpO2 98%   BMI 24.69 kg/m²   Pulmonary:      Effort: Pulmonary effort is normal.      Breath sounds: Normal breath sounds. No stridor. No wheezing. No rhonchi. No rales.   Cardiovascular:      PMI at left midclavicular line. Normal rate. Regular rhythm. Normal S1. Normal S2.      Murmurs: There is no murmur.      No gallop. No click. No rub.   Pulses:     Intact distal pulses.   Edema:     Peripheral edema absent.   Skin:     Comments: Small area of cellulitis with a tick bite in the center           Lab Review  Lab Results   Component Value Date     07/01/2022    K 4.4 07/01/2022     07/01/2022    BUN 15 07/01/2022    CREATININE 0.89 07/01/2022    GLUCOSE 79 07/01/2022    CALCIUM 9.2 07/01/2022    ALT 24 07/01/2022    ALKPHOS 66 07/01/2022    LABIL2 1.7 05/09/2016     Lab Results   Component Value Date    CKTOTAL 127 07/01/2022     Lab Results   Component Value Date    WBC 3.68 07/01/2022    HGB 13.3 07/01/2022    HCT 39.2 07/01/2022     07/01/2022     Lab Results   Component Value Date    INR 0.95 05/19/2014     No results found for: MG  Lab Results   Component Value Date    TSH 2.389 10/27/2016     No results found for: BNP  Lab Results   Component Value Date    CHLPL 195 05/09/2016    CHOL 144 07/01/2022    TRIG 95 07/01/2022    HDL 52 07/01/2022    VLDL 18 07/01/2022    LDLHDL 1.40 07/01/2022     Lab Results   Component Value Date    LDL 74 07/01/2022    LDL 90 " 12/30/2021       Procedures       I personally viewed and interpreted the patient's LAB data         Assessment:     1. Nonrheumatic mild aortic valve insufficiency and mild tricuspid regurgitation 2018.    2. Mixed hyperlipidemia    3. Tick bite of back wall of thorax, unspecified location, initial encounter          Plan:     Patient has a history of aortic regurgitation and mild tricuspid regurgitation is asymptomatic we will continue to observe.    Hyperlipidemia on Crestor therapy which was continued.  Last LDL was 74.  No drug side effect.  Liver functions are normal no myalgias.    Tick bite with cellulitis patient was started on doxycycline.  She will continue B12 vitamin D and Fosamax.  Follow-up scheduled        No follow-ups on file.

## 2022-07-08 PROBLEM — S20.96XA TICK BITE OF THORACIC WALL: Status: ACTIVE | Noted: 2022-07-08
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
## 2022-07-21 RX ORDER — OMEPRAZOLE 40 MG/1
40 CAPSULE, DELAYED RELEASE ORAL DAILY
Qty: 90 CAPSULE | Refills: 0 | Status: SHIPPED | OUTPATIENT
Start: 2022-07-21 | End: 2022-11-17 | Stop reason: SDUPTHER

## 2022-09-06 RX ORDER — MONTELUKAST SODIUM 10 MG/1
10 TABLET ORAL NIGHTLY
Qty: 90 TABLET | Refills: 0 | Status: SHIPPED | OUTPATIENT
Start: 2022-09-06

## 2022-09-22 RX ORDER — SIMVASTATIN 20 MG
20 TABLET ORAL NIGHTLY
Qty: 90 TABLET | Refills: 3 | OUTPATIENT
Start: 2022-09-22

## 2022-09-22 RX ORDER — ALENDRONATE SODIUM 70 MG/1
TABLET ORAL
Qty: 12 TABLET | Refills: 1 | Status: SHIPPED | OUTPATIENT
Start: 2022-09-22 | End: 2022-10-06 | Stop reason: SDUPTHER

## 2022-10-06 ENCOUNTER — OFFICE VISIT (OUTPATIENT)
Dept: CARDIOLOGY | Facility: CLINIC | Age: 77
End: 2022-10-06

## 2022-10-06 VITALS
HEART RATE: 70 BPM | HEIGHT: 66 IN | SYSTOLIC BLOOD PRESSURE: 130 MMHG | BODY MASS INDEX: 24.27 KG/M2 | DIASTOLIC BLOOD PRESSURE: 72 MMHG | WEIGHT: 151 LBS | OXYGEN SATURATION: 98 %

## 2022-10-06 DIAGNOSIS — K21.9 GASTROESOPHAGEAL REFLUX DISEASE WITHOUT ESOPHAGITIS: ICD-10-CM

## 2022-10-06 DIAGNOSIS — E78.2 MIXED HYPERLIPIDEMIA: Primary | ICD-10-CM

## 2022-10-06 DIAGNOSIS — E55.9 VITAMIN D DEFICIENCY: ICD-10-CM

## 2022-10-06 DIAGNOSIS — Z91.09 ENVIRONMENTAL ALLERGIES: ICD-10-CM

## 2022-10-06 DIAGNOSIS — Z23 NEED FOR VACCINATION: ICD-10-CM

## 2022-10-06 DIAGNOSIS — M81.0 AGE-RELATED OSTEOPOROSIS WITHOUT CURRENT PATHOLOGICAL FRACTURE: ICD-10-CM

## 2022-10-06 PROCEDURE — 90662 IIV NO PRSV INCREASED AG IM: CPT | Performed by: INTERNAL MEDICINE

## 2022-10-06 PROCEDURE — 99214 OFFICE O/P EST MOD 30 MIN: CPT | Performed by: INTERNAL MEDICINE

## 2022-10-06 PROCEDURE — G0008 ADMIN INFLUENZA VIRUS VAC: HCPCS | Performed by: INTERNAL MEDICINE

## 2022-10-06 RX ORDER — ALENDRONATE SODIUM 70 MG/1
70 TABLET ORAL
Qty: 12 TABLET | Refills: 1 | Status: SHIPPED | OUTPATIENT
Start: 2022-10-06

## 2022-10-06 RX ORDER — ATORVASTATIN CALCIUM 20 MG/1
20 TABLET, FILM COATED ORAL DAILY
Qty: 90 TABLET | Refills: 1 | Status: SHIPPED | OUTPATIENT
Start: 2022-10-06

## 2022-10-06 NOTE — PROGRESS NOTES
subjective     Chief Complaint   Patient presents with   • Hyperlipidemia     Follow up   • Med Refill     pending     History of Present Illness  Patient is 77 years old white female who is here for follow-up of multiple chronic medical problems.  She states that she is doing very well she denies any chest pain palpitations or shortness of breath.    She has history of mild aortic insufficiency and mild tricuspid regurgitation also has history of palpitations and hyperlipidemia.    Hyperlipidemia is controlled with Lipitor 20 mg daily.  Osteoporosis on Fosamax and vitamin D.  Environmental allergies on Singulair.  GI symptoms are better with Prilosec.  Overall she states that she is doing very well with no specific new complaints.  She wants flu shot today.  COVID booster also discussed with the patient    Past Surgical History:   Procedure Laterality Date   • CARDIAC CATHETERIZATION  2014   • CARDIOVASCULAR STRESS TEST  2014   • CATARACT EXTRACTION, BILATERAL  09/2021   • COLONOSCOPY     • ECHO - CONVERTED  2014   • ENDOSCOPY  2015   • EXTRACORPOREAL SHOCK WAVE LITHOTRIPSY (ESWL) Left 9/13/2019    Procedure: EXTRACORPOREAL SHOCKWAVE LITHOTRIPSY;  Surgeon: Demarco Aldana MD;  Location: [facility];  Service: Urology   • HYSTERECTOMY  2002    benign   • LAPAROSCOPIC CHOLECYSTECTOMY  2001     Family History   Problem Relation Age of Onset   • Heart attack Mother    • Heart failure Mother    • Hypertension Mother    • Heart attack Father    • Hypertension Father    • Heart block Brother    • Heart block Sister    • Heart block Sister    • Heart attack Brother    • Heart block Brother    • Stroke Brother    • Breast cancer Neg Hx      Past Medical History:   Diagnosis Date   • Asthma    • Elevated cholesterol    • Essential hypertension 9/24/2020   • GERD (gastroesophageal reflux disease)    • Hyperlipidemia    • Kidney stones    • Osteoporosis    • Tongue irritation    • Weight loss      Patient Active Problem  List   Diagnosis   • Mixed hyperlipidemia   • Osteoporosis   • Ganglion cyst   • Gastroesophageal reflux disease without esophagitis   • Vitamin D deficiency   • Continuous leakage of urine   • Cough   • Environmental allergies   • URI, acute   • Chest pain in adult   • Xerostomia   • Chronic fatigue   • Cutaneous candidiasis   • Renal calculus, left   • Screening for breast cancer   • Screening for osteoporosis   • Palpitations   • Nonrheumatic mild aortic valve insufficiency and mild tricuspid regurgitation 2018.   • Allergic contact dermatitis due to plants, except food   • Bilateral lower extremity edema   • Tick bite of thoracic wall       Social History     Tobacco Use   • Smoking status: Never Smoker   • Smokeless tobacco: Never Used   Vaping Use   • Vaping Use: Never used   Substance Use Topics   • Alcohol use: No   • Drug use: No       Allergies   Allergen Reactions   • Latex Other (See Comments)     Blisters on hands       Current Outpatient Medications on File Prior to Visit   Medication Sig   • aspirin 81 MG EC tablet Take 81 mg by mouth daily.   • cholecalciferol (VITAMIN D3) 1000 UNITS tablet Take 1,000 Units by mouth daily.   • furosemide (LASIX) 20 MG tablet Take 1 tablet by mouth Every Other Day As Needed (edema).   • montelukast (SINGULAIR) 10 MG tablet TAKE 1 TABLET BY MOUTH EVERY NIGHT   • Multiple Vitamin (MULTI VITAMIN DAILY PO) Take  by mouth.   • omeprazole (priLOSEC) 40 MG capsule TAKE 1 CAPSULE BY MOUTH DAILY   • vitamin B-12 (CYANOCOBALAMIN) 1000 MCG tablet Take 1,000 mcg by mouth daily.   • [DISCONTINUED] alendronate (FOSAMAX) 70 MG tablet TAKE 1 TABLET BY MOUTH EVERY 7 DAYS   • [DISCONTINUED] atorvastatin (LIPITOR) 20 MG tablet Take 1 tablet by mouth Daily.   • [DISCONTINUED] doxycycline (VIBRAMYICN) 100 MG tablet Take 1 tablet by mouth 2 (Two) Times a Day.     No current facility-administered medications on file prior to visit.         The following portions of the patient's history  "were reviewed and updated as appropriate: allergies, current medications, past family history, past medical history, past social history, past surgical history and problem list.    Review of Systems   Constitutional: Negative.   HENT: Negative.  Negative for congestion.    Eyes: Negative.    Cardiovascular: Negative.  Negative for chest pain, cyanosis, dyspnea on exertion, irregular heartbeat, leg swelling, near-syncope, orthopnea, palpitations, paroxysmal nocturnal dyspnea and syncope.   Respiratory: Negative.  Negative for shortness of breath.    Hematologic/Lymphatic: Negative.    Musculoskeletal: Negative.    Gastrointestinal: Negative.    Neurological: Negative.  Negative for headaches.          Objective:     /72 (BP Location: Left arm, Patient Position: Sitting, Cuff Size: Adult)   Pulse 70   Ht 167.6 cm (66\")   Wt 68.5 kg (151 lb)   SpO2 98%   BMI 24.37 kg/m²   Pulmonary:      Effort: Pulmonary effort is normal.      Breath sounds: Normal breath sounds. No stridor. No wheezing. No rhonchi. No rales.   Cardiovascular:      PMI at left midclavicular line. Normal rate. Regular rhythm. Normal S1. Normal S2.      Murmurs: There is no murmur.      No gallop. No click. No rub.   Pulses:     Intact distal pulses.   Edema:     Peripheral edema absent.           Lab Review  Lab Results   Component Value Date     07/01/2022    K 4.4 07/01/2022     07/01/2022    BUN 15 07/01/2022    CREATININE 0.89 07/01/2022    GLUCOSE 79 07/01/2022    CALCIUM 9.2 07/01/2022    ALT 24 07/01/2022    ALKPHOS 66 07/01/2022    LABIL2 1.7 05/09/2016     Lab Results   Component Value Date    CKTOTAL 127 07/01/2022     Lab Results   Component Value Date    WBC 3.68 07/01/2022    HGB 13.3 07/01/2022    HCT 39.2 07/01/2022     07/01/2022     Lab Results   Component Value Date    INR 0.95 05/19/2014     No results found for: MG  Lab Results   Component Value Date    TSH 2.389 10/27/2016     No results found for: " BNP  Lab Results   Component Value Date    CHLPL 195 05/09/2016    CHOL 144 07/01/2022    TRIG 95 07/01/2022    HDL 52 07/01/2022    VLDL 18 07/01/2022    LDLHDL 1.40 07/01/2022     Lab Results   Component Value Date    LDL 74 07/01/2022    LDL 90 12/30/2021       Procedures       I personally viewed and interpreted the patient's LAB data         Assessment:     1. Mixed hyperlipidemia    2. Need for vaccination    3. Age-related osteoporosis without current pathological fracture    4. Vitamin D deficiency    5. Gastroesophageal reflux disease without esophagitis    6. Environmental allergies          Plan:     Hyperlipidemia  She was advised to continue Lipitor 20 mg daily.  Last LDL was 74.  Lab work scheduled healthy lifestyle and weight loss emphasized.    Flu shot was given.  Because of osteoporosis she will continue Fosamax and vitamin D.  We will also check vitamin D level.  GERD symptoms are controlled with Prilosec which was continued.    Environmental allergies Singulair continued.  Follow-up scheduled with lab work next visit        No follow-ups on file.

## 2022-11-17 ENCOUNTER — OFFICE VISIT (OUTPATIENT)
Dept: CARDIOLOGY | Facility: CLINIC | Age: 77
End: 2022-11-17

## 2022-11-17 VITALS
OXYGEN SATURATION: 99 % | DIASTOLIC BLOOD PRESSURE: 78 MMHG | SYSTOLIC BLOOD PRESSURE: 128 MMHG | HEIGHT: 66 IN | HEART RATE: 84 BPM | BODY MASS INDEX: 24.27 KG/M2 | WEIGHT: 151 LBS

## 2022-11-17 DIAGNOSIS — M81.0 AGE-RELATED OSTEOPOROSIS WITHOUT CURRENT PATHOLOGICAL FRACTURE: ICD-10-CM

## 2022-11-17 DIAGNOSIS — I35.1 NONRHEUMATIC AORTIC VALVE INSUFFICIENCY: ICD-10-CM

## 2022-11-17 DIAGNOSIS — E78.2 MIXED HYPERLIPIDEMIA: Primary | ICD-10-CM

## 2022-11-17 PROCEDURE — G0439 PPPS, SUBSEQ VISIT: HCPCS | Performed by: INTERNAL MEDICINE

## 2022-11-17 NOTE — PROGRESS NOTES
QUICK REFERENCE INFORMATION:  The ABCs of the Annual Wellness Visit    Subsequent Medicare Wellness Visit    HEALTH RISK ASSESSMENT    1945    Recent Hospitalizations:  No hospitalization(s) within the last year..        Current Medical Providers:  Patient Care Team:  Víctor Rodrigues MD as PCP - General  Víctor Rodrigues MD as PCP - Family Medicine        Smoking Status:  Social History     Tobacco Use   Smoking Status Never   Smokeless Tobacco Never       Alcohol Consumption:  Social History     Substance and Sexual Activity   Alcohol Use No       Depression Screen:   PHQ-2/PHQ-9 Depression Screening 11/17/2022   Retired PHQ-9 Total Score -   Retired Total Score -   Little Interest or Pleasure in Doing Things 0-->not at all   Feeling Down, Depressed or Hopeless 0-->not at all   PHQ-9: Brief Depression Severity Measure Score 0       Health Habits and Functional and Cognitive Screening:  Functional & Cognitive Status 11/17/2022   Do you have difficulty preparing food and eating? No   Do you have difficulty bathing yourself, getting dressed or grooming yourself? No   Do you have difficulty using the toilet? No   Do you have difficulty moving around from place to place? No   Do you have trouble with steps or getting out of a bed or a chair? No   Current Diet Limited Junk Food   Dental Exam Up to date   Eye Exam Up to date   Exercise (times per week) 5 times per week   Do you need help using the phone?  No   Are you deaf or do you have serious difficulty hearing?  Yes   Do you need help with transportation? No   Do you need help shopping? No   Do you need help preparing meals?  No   Do you need help with housework?  No   Do you need help with laundry? No   Do you need help taking your medications? No   Do you need help managing money? No   Do you ever drive or ride in a car without wearing a seat belt? No   Have you felt unusual stress, anger or loneliness in the last month? No   Who do you live with?  Spouse   If you need help, do you have trouble finding someone available to you? No   Have you been bothered in the last four weeks by sexual problems? No   Do you have difficulty concentrating, remembering or making decisions? No           Does the patient have evidence of cognitive impairment? No    Aspirin use counseling: Taking ASA appropriately as indicated      Recent Lab Results:  CMP:  Lab Results   Component Value Date    BUN 15 07/01/2022    CREATININE 0.89 07/01/2022    EGFRIFNONA 58 (L) 12/30/2021    BCR 16.9 07/01/2022     07/01/2022    K 4.4 07/01/2022    CO2 26.0 07/01/2022    CALCIUM 9.2 07/01/2022    ALBUMIN 4.10 07/01/2022    LABIL2 1.7 05/09/2016    BILITOT 0.6 07/01/2022    ALKPHOS 66 07/01/2022    AST 17 07/01/2022    ALT 24 07/01/2022     Lipid Panel:  Lab Results   Component Value Date    CHOL 144 07/01/2022    TRIG 95 07/01/2022    HDL 52 07/01/2022    VLDL 18 07/01/2022    LDLHDL 1.40 07/01/2022     HbA1c:  Lab Results   Component Value Date    HGBA1C 5.3 06/03/2014       Visual Acuity:  No results found.    Age-appropriate Screening Schedule:  Refer to the list below for future screening recommendations based on patient's age, sex and/or medical conditions. Orders for these recommended tests are listed in the plan section. The patient has been provided with a written plan.    Health Maintenance   Topic Date Due   • ZOSTER VACCINE (2 of 2) 06/05/2018   • TDAP/TD VACCINES (1 - Tdap) 10/06/2023 (Originally 6/7/1964)   • MAMMOGRAM  02/11/2023   • PAP SMEAR  04/01/2023   • LIPID PANEL  07/01/2023   • DXA SCAN  02/11/2024   • INFLUENZA VACCINE  Completed        Subjective   History of Present Illness    Elise Santamaria is a 77 y.o. female who presents for an Subsequent Wellness Visit.    The following portions of the patient's history were reviewed and updated as appropriate: past social history, past surgical history and problem list.    Outpatient Medications Prior to Visit   Medication  Sig Dispense Refill   • alendronate (FOSAMAX) 70 MG tablet Take 1 tablet by mouth Every 7 (Seven) Days. 12 tablet 1   • aspirin 81 MG EC tablet Take 81 mg by mouth daily.     • atorvastatin (LIPITOR) 20 MG tablet Take 1 tablet by mouth Daily. 90 tablet 1   • cholecalciferol (VITAMIN D3) 1000 UNITS tablet Take 1,000 Units by mouth daily.     • furosemide (LASIX) 20 MG tablet Take 1 tablet by mouth Every Other Day As Needed (edema). 45 tablet 2   • montelukast (SINGULAIR) 10 MG tablet TAKE 1 TABLET BY MOUTH EVERY NIGHT 90 tablet 0   • Multiple Vitamin (MULTI VITAMIN DAILY PO) Take  by mouth.     • vitamin B-12 (CYANOCOBALAMIN) 1000 MCG tablet Take 1,000 mcg by mouth daily.     • omeprazole (priLOSEC) 40 MG capsule TAKE 1 CAPSULE BY MOUTH DAILY 90 capsule 0     No facility-administered medications prior to visit.       Patient Active Problem List   Diagnosis   • Mixed hyperlipidemia   • Osteoporosis   • Ganglion cyst   • Gastroesophageal reflux disease without esophagitis   • Vitamin D deficiency   • Continuous leakage of urine   • Cough   • Environmental allergies   • URI, acute   • Chest pain in adult   • Xerostomia   • Chronic fatigue   • Cutaneous candidiasis   • Renal calculus, left   • Screening for breast cancer   • Screening for osteoporosis   • Palpitations   • Nonrheumatic mild aortic valve insufficiency and mild tricuspid regurgitation 2018.   • Allergic contact dermatitis due to plants, except food   • Bilateral lower extremity edema   • Tick bite of thoracic wall       Advance Care Planning:  ACP discussion was held with the patient during this visit. Patient has an advance directive in EMR which is still valid.     Identification of Risk Factors:  Risk factors include: Advance Directive Discussion  Cardiovascular risk.    Review of Systems   Constitutional: Negative.    HENT: Negative.    Eyes: Negative.    Respiratory: Negative.    Cardiovascular: Negative.    Gastrointestinal: Negative.    Endocrine:  Negative.    Genitourinary: Negative.    Musculoskeletal: Negative.    Skin: Negative.    Allergic/Immunologic: Negative.    Neurological: Negative.    Hematological: Negative.    Psychiatric/Behavioral: Negative.        Compared to one year ago, the patient feels her physical health is the same.  Compared to one year ago, the patient feels her mental health is the same.    Objective     Physical Exam  Vitals and nursing note reviewed.   Constitutional:       General: She is not in acute distress.     Appearance: Normal appearance. She is normal weight. She is not toxic-appearing.   HENT:      Head: Normocephalic and atraumatic.      Nose: Nose normal. No congestion or rhinorrhea.   Eyes:      Extraocular Movements: Extraocular movements intact.      Conjunctiva/sclera: Conjunctivae normal.      Pupils: Pupils are equal, round, and reactive to light.   Cardiovascular:      Rate and Rhythm: Normal rate and regular rhythm.      Pulses: Normal pulses.      Heart sounds: Normal heart sounds.   Pulmonary:      Effort: Pulmonary effort is normal.      Breath sounds: Normal breath sounds.   Abdominal:      General: Abdomen is flat. There is no distension.      Palpations: Abdomen is soft.      Tenderness: There is no abdominal tenderness. There is no guarding.   Musculoskeletal:         General: Normal range of motion.      Cervical back: Normal range of motion and neck supple. No rigidity.   Lymphadenopathy:      Cervical: No cervical adenopathy.   Skin:     General: Skin is warm and dry.      Capillary Refill: Capillary refill takes less than 2 seconds.   Neurological:      General: No focal deficit present.      Mental Status: She is alert and oriented to person, place, and time.   Psychiatric:         Mood and Affect: Mood normal.         Behavior: Behavior normal.         Thought Content: Thought content normal.         Judgment: Judgment normal.         Vitals:    11/17/22 1533   BP: 128/78   BP Location: Left arm  "  Patient Position: Sitting   Cuff Size: Adult   Pulse: 84   SpO2: 99%   Weight: 68.5 kg (151 lb)   Height: 167.6 cm (66\")   PainSc: 0-No pain       BMI is within normal parameters. No other follow-up for BMI required.      Assessment & Plan   Patient Self-Management and Personalized Health Advice  The patient has been provided with information about: diet, exercise, prevention of cardiac or vascular disease and fall prevention and preventive services including:   · Colorectal Cancer Screening, Cologuard Test .    Visit Diagnoses:    ICD-10-CM ICD-9-CM   1. Mixed hyperlipidemia  E78.2 272.2   2. Nonrheumatic mild aortic valve insufficiency and mild tricuspid regurgitation 2018.  I35.1 424.1   3. Age-related osteoporosis without current pathological fracture  M81.0 733.01       No orders of the defined types were placed in this encounter.      Outpatient Encounter Medications as of 11/17/2022   Medication Sig Dispense Refill   • alendronate (FOSAMAX) 70 MG tablet Take 1 tablet by mouth Every 7 (Seven) Days. 12 tablet 1   • aspirin 81 MG EC tablet Take 81 mg by mouth daily.     • atorvastatin (LIPITOR) 20 MG tablet Take 1 tablet by mouth Daily. 90 tablet 1   • cholecalciferol (VITAMIN D3) 1000 UNITS tablet Take 1,000 Units by mouth daily.     • furosemide (LASIX) 20 MG tablet Take 1 tablet by mouth Every Other Day As Needed (edema). 45 tablet 2   • montelukast (SINGULAIR) 10 MG tablet TAKE 1 TABLET BY MOUTH EVERY NIGHT 90 tablet 0   • Multiple Vitamin (MULTI VITAMIN DAILY PO) Take  by mouth.     • omeprazole (priLOSEC) 40 MG capsule Take 1 capsule by mouth Daily. 90 capsule 1   • vitamin B-12 (CYANOCOBALAMIN) 1000 MCG tablet Take 1,000 mcg by mouth daily.     • [DISCONTINUED] omeprazole (priLOSEC) 40 MG capsule TAKE 1 CAPSULE BY MOUTH DAILY 90 capsule 0     No facility-administered encounter medications on file as of 11/17/2022.       Reviewed use of high risk medication in the elderly: yes  Reviewed for potential of " harmful drug interactions in the elderly: not applicable     Cologuard arranged  Shingles shot second dose arranged.  COVID booster arranged.  CBC CMP CK lipid panel and vitamin D level arranged.    Follow Up:  No follow-ups on file.     An After Visit Summary and PPPS with all of these plans were given to the patient.

## 2022-11-20 RX ORDER — OMEPRAZOLE 40 MG/1
40 CAPSULE, DELAYED RELEASE ORAL DAILY
Qty: 90 CAPSULE | Refills: 1 | Status: SHIPPED | OUTPATIENT
Start: 2022-11-20

## 2022-11-21 DIAGNOSIS — Z12.11 SCREENING FOR COLON CANCER: Primary | ICD-10-CM

## 2022-12-14 ENCOUNTER — TELEPHONE (OUTPATIENT)
Dept: CARDIOLOGY | Facility: CLINIC | Age: 77
End: 2022-12-14

## 2022-12-14 NOTE — TELEPHONE ENCOUNTER
----- Message from Víctor Rodrigues MD sent at 12/14/2022  9:20 AM EST -----  Cologuard is okay   Contrast: Isovue. Contrast concentration: 370. Amount: 130 mL.

## 2023-01-03 ENCOUNTER — TELEPHONE (OUTPATIENT)
Dept: CARDIOLOGY | Facility: CLINIC | Age: 78
End: 2023-01-03

## 2023-01-03 NOTE — TELEPHONE ENCOUNTER
Caller: Elise Santamaria    Relationship: Self    Best call back number: [phone]    What is the best time to reach you: ANY    Who are you requesting to speak with (clinical staff, provider,  specific staff member):     Do you know the name of the person who called:     What was the call regarding: PT WANTED TO INFORM THE OFFICE THAT SHE CAN NO LONGER BE SEEN BECAUSE HER INSURANCE IS NOT ACCEPTED.     Do you require a callback: NO

## 2023-02-27 ENCOUNTER — TRANSCRIBE ORDERS (OUTPATIENT)
Dept: ADMINISTRATIVE | Facility: HOSPITAL | Age: 78
End: 2023-02-27
Payer: MEDICARE

## 2023-02-27 DIAGNOSIS — Z12.31 SCREENING MAMMOGRAM FOR BREAST CANCER: Primary | ICD-10-CM

## 2023-03-27 ENCOUNTER — APPOINTMENT (OUTPATIENT)
Dept: MRI IMAGING | Facility: HOSPITAL | Age: 78
End: 2023-03-27
Payer: MEDICARE

## 2023-03-27 ENCOUNTER — HOSPITAL ENCOUNTER (EMERGENCY)
Facility: HOSPITAL | Age: 78
Discharge: HOME | End: 2023-03-27
Attending: STUDENT IN AN ORGANIZED HEALTH CARE EDUCATION/TRAINING PROGRAM | Admitting: STUDENT IN AN ORGANIZED HEALTH CARE EDUCATION/TRAINING PROGRAM
Payer: MEDICARE

## 2023-03-27 ENCOUNTER — APPOINTMENT (OUTPATIENT)
Dept: CT IMAGING | Facility: HOSPITAL | Age: 78
End: 2023-03-27
Payer: MEDICARE

## 2023-03-27 ENCOUNTER — APPOINTMENT (OUTPATIENT)
Dept: GENERAL RADIOLOGY | Facility: HOSPITAL | Age: 78
End: 2023-03-27
Payer: MEDICARE

## 2023-03-27 VITALS
DIASTOLIC BLOOD PRESSURE: 77 MMHG | SYSTOLIC BLOOD PRESSURE: 142 MMHG | RESPIRATION RATE: 16 BRPM | TEMPERATURE: 97.9 F | BODY MASS INDEX: 24.27 KG/M2 | WEIGHT: 151 LBS | HEIGHT: 66 IN | HEART RATE: 63 BPM | OXYGEN SATURATION: 100 %

## 2023-03-27 DIAGNOSIS — R20.0 LEFT ARM NUMBNESS: ICD-10-CM

## 2023-03-27 DIAGNOSIS — R68.84 JAW PAIN: ICD-10-CM

## 2023-03-27 DIAGNOSIS — R77.8 ELEVATED TROPONIN: ICD-10-CM

## 2023-03-27 DIAGNOSIS — M79.602 LEFT ARM PAIN: Primary | ICD-10-CM

## 2023-03-27 LAB
ABO GROUP BLD: NORMAL
ABO GROUP BLD: NORMAL
ALBUMIN SERPL-MCNC: 3.9 G/DL (ref 3.5–5.2)
ALBUMIN/GLOB SERPL: 1.7 G/DL
ALP SERPL-CCNC: 69 U/L (ref 39–117)
ALT SERPL W P-5'-P-CCNC: 20 U/L (ref 1–33)
ANION GAP SERPL CALCULATED.3IONS-SCNC: 8.8 MMOL/L (ref 5–15)
APTT PPP: 23.3 SECONDS (ref 26.5–34.5)
AST SERPL-CCNC: 18 U/L (ref 1–32)
BASOPHILS # BLD AUTO: 0.06 10*3/MM3 (ref 0–0.2)
BASOPHILS NFR BLD AUTO: 1 % (ref 0–1.5)
BILIRUB SERPL-MCNC: 0.5 MG/DL (ref 0–1.2)
BLD GP AB SCN SERPL QL: NEGATIVE
BUN SERPL-MCNC: 13 MG/DL (ref 8–23)
BUN/CREAT SERPL: 12.7 (ref 7–25)
CALCIUM SPEC-SCNC: 9.4 MG/DL (ref 8.6–10.5)
CHLORIDE SERPL-SCNC: 107 MMOL/L (ref 98–107)
CHOLEST SERPL-MCNC: 137 MG/DL (ref 0–200)
CO2 SERPL-SCNC: 25.2 MMOL/L (ref 22–29)
CREAT BLDA-MCNC: 1.2 MG/DL (ref 0.6–1.3)
CREAT SERPL-MCNC: 1.02 MG/DL (ref 0.57–1)
D-LACTATE SERPL-SCNC: 0.7 MMOL/L (ref 0.5–2)
DEPRECATED RDW RBC AUTO: 44.9 FL (ref 37–54)
EGFRCR SERPLBLD CKD-EPI 2021: 56.4 ML/MIN/1.73
EOSINOPHIL # BLD AUTO: 0.15 10*3/MM3 (ref 0–0.4)
EOSINOPHIL NFR BLD AUTO: 2.4 % (ref 0.3–6.2)
ERYTHROCYTE [DISTWIDTH] IN BLOOD BY AUTOMATED COUNT: 12.6 % (ref 12.3–15.4)
FLUAV RNA RESP QL NAA+PROBE: NOT DETECTED
FLUBV RNA RESP QL NAA+PROBE: NOT DETECTED
GEN 5 2HR TROPONIN T REFLEX: 25 NG/L
GLOBULIN UR ELPH-MCNC: 2.3 GM/DL
GLUCOSE BLDC GLUCOMTR-MCNC: 100 MG/DL (ref 70–130)
GLUCOSE SERPL-MCNC: 104 MG/DL (ref 65–99)
HBA1C MFR BLD: 5.2 % (ref 4.8–5.6)
HCT VFR BLD AUTO: 43 % (ref 34–46.6)
HDLC SERPL-MCNC: 48 MG/DL (ref 40–60)
HGB BLD-MCNC: 13.9 G/DL (ref 12–15.9)
HOLD SPECIMEN: NORMAL
IMM GRANULOCYTES # BLD AUTO: 0.01 10*3/MM3 (ref 0–0.05)
IMM GRANULOCYTES NFR BLD AUTO: 0.2 % (ref 0–0.5)
INR PPP: 0.94 (ref 0.9–1.1)
LDLC SERPL CALC-MCNC: 70 MG/DL (ref 0–100)
LDLC/HDLC SERPL: 1.42 {RATIO}
LYMPHOCYTES # BLD AUTO: 1.72 10*3/MM3 (ref 0.7–3.1)
LYMPHOCYTES NFR BLD AUTO: 27.7 % (ref 19.6–45.3)
MAGNESIUM SERPL-MCNC: 1.9 MG/DL (ref 1.6–2.4)
MCH RBC QN AUTO: 31.2 PG (ref 26.6–33)
MCHC RBC AUTO-ENTMCNC: 32.3 G/DL (ref 31.5–35.7)
MCV RBC AUTO: 96.4 FL (ref 79–97)
MONOCYTES # BLD AUTO: 0.44 10*3/MM3 (ref 0.1–0.9)
MONOCYTES NFR BLD AUTO: 7.1 % (ref 5–12)
NEUTROPHILS NFR BLD AUTO: 3.84 10*3/MM3 (ref 1.7–7)
NEUTROPHILS NFR BLD AUTO: 61.6 % (ref 42.7–76)
NRBC BLD AUTO-RTO: 0 /100 WBC (ref 0–0.2)
PLATELET # BLD AUTO: 212 10*3/MM3 (ref 140–450)
PMV BLD AUTO: 10.2 FL (ref 6–12)
POTASSIUM SERPL-SCNC: 4.6 MMOL/L (ref 3.5–5.2)
PROT SERPL-MCNC: 6.2 G/DL (ref 6–8.5)
PROTHROMBIN TIME: 12.7 SECONDS (ref 12.1–14.7)
RBC # BLD AUTO: 4.46 10*6/MM3 (ref 3.77–5.28)
RH BLD: POSITIVE
RH BLD: POSITIVE
SARS-COV-2 RNA RESP QL NAA+PROBE: NOT DETECTED
SODIUM SERPL-SCNC: 141 MMOL/L (ref 136–145)
T&S EXPIRATION DATE: NORMAL
TRIGL SERPL-MCNC: 104 MG/DL (ref 0–150)
TROPONIN T DELTA: -5 NG/L
TROPONIN T SERPL HS-MCNC: 30 NG/L
TROPONIN T SERPL HS-MCNC: 30 NG/L
VLDLC SERPL-MCNC: 19 MG/DL (ref 5–40)
WBC NRBC COR # BLD: 6.22 10*3/MM3 (ref 3.4–10.8)
WHOLE BLOOD HOLD COAG: NORMAL
WHOLE BLOOD HOLD COAG: NORMAL
WHOLE BLOOD HOLD SPECIMEN: NORMAL
WHOLE BLOOD HOLD SPECIMEN: NORMAL

## 2023-03-27 PROCEDURE — 99284 EMERGENCY DEPT VISIT MOD MDM: CPT

## 2023-03-27 PROCEDURE — 84484 ASSAY OF TROPONIN QUANT: CPT | Performed by: STUDENT IN AN ORGANIZED HEALTH CARE EDUCATION/TRAINING PROGRAM

## 2023-03-27 PROCEDURE — 80061 LIPID PANEL: CPT | Performed by: STUDENT IN AN ORGANIZED HEALTH CARE EDUCATION/TRAINING PROGRAM

## 2023-03-27 PROCEDURE — 86900 BLOOD TYPING SEROLOGIC ABO: CPT

## 2023-03-27 PROCEDURE — 86901 BLOOD TYPING SEROLOGIC RH(D): CPT

## 2023-03-27 PROCEDURE — 86900 BLOOD TYPING SEROLOGIC ABO: CPT | Performed by: STUDENT IN AN ORGANIZED HEALTH CARE EDUCATION/TRAINING PROGRAM

## 2023-03-27 PROCEDURE — 70450 CT HEAD/BRAIN W/O DYE: CPT

## 2023-03-27 PROCEDURE — 25510000001 IOPAMIDOL PER 1 ML: Performed by: STUDENT IN AN ORGANIZED HEALTH CARE EDUCATION/TRAINING PROGRAM

## 2023-03-27 PROCEDURE — 82565 ASSAY OF CREATININE: CPT

## 2023-03-27 PROCEDURE — 87636 SARSCOV2 & INF A&B AMP PRB: CPT | Performed by: STUDENT IN AN ORGANIZED HEALTH CARE EDUCATION/TRAINING PROGRAM

## 2023-03-27 PROCEDURE — 83605 ASSAY OF LACTIC ACID: CPT | Performed by: STUDENT IN AN ORGANIZED HEALTH CARE EDUCATION/TRAINING PROGRAM

## 2023-03-27 PROCEDURE — 85730 THROMBOPLASTIN TIME PARTIAL: CPT | Performed by: STUDENT IN AN ORGANIZED HEALTH CARE EDUCATION/TRAINING PROGRAM

## 2023-03-27 PROCEDURE — 85610 PROTHROMBIN TIME: CPT | Performed by: STUDENT IN AN ORGANIZED HEALTH CARE EDUCATION/TRAINING PROGRAM

## 2023-03-27 PROCEDURE — 82962 GLUCOSE BLOOD TEST: CPT

## 2023-03-27 PROCEDURE — 86901 BLOOD TYPING SEROLOGIC RH(D): CPT | Performed by: STUDENT IN AN ORGANIZED HEALTH CARE EDUCATION/TRAINING PROGRAM

## 2023-03-27 PROCEDURE — 85025 COMPLETE CBC W/AUTO DIFF WBC: CPT | Performed by: PHYSICIAN ASSISTANT

## 2023-03-27 PROCEDURE — 84484 ASSAY OF TROPONIN QUANT: CPT | Performed by: PHYSICIAN ASSISTANT

## 2023-03-27 PROCEDURE — 71045 X-RAY EXAM CHEST 1 VIEW: CPT | Performed by: RADIOLOGY

## 2023-03-27 PROCEDURE — 71045 X-RAY EXAM CHEST 1 VIEW: CPT

## 2023-03-27 PROCEDURE — 80053 COMPREHEN METABOLIC PANEL: CPT | Performed by: PHYSICIAN ASSISTANT

## 2023-03-27 PROCEDURE — 82607 VITAMIN B-12: CPT | Performed by: STUDENT IN AN ORGANIZED HEALTH CARE EDUCATION/TRAINING PROGRAM

## 2023-03-27 PROCEDURE — 93005 ELECTROCARDIOGRAM TRACING: CPT | Performed by: PHYSICIAN ASSISTANT

## 2023-03-27 PROCEDURE — 83036 HEMOGLOBIN GLYCOSYLATED A1C: CPT | Performed by: STUDENT IN AN ORGANIZED HEALTH CARE EDUCATION/TRAINING PROGRAM

## 2023-03-27 PROCEDURE — 36415 COLL VENOUS BLD VENIPUNCTURE: CPT

## 2023-03-27 PROCEDURE — 96360 HYDRATION IV INFUSION INIT: CPT

## 2023-03-27 PROCEDURE — 70498 CT ANGIOGRAPHY NECK: CPT

## 2023-03-27 PROCEDURE — 93010 ELECTROCARDIOGRAM REPORT: CPT | Performed by: INTERNAL MEDICINE

## 2023-03-27 PROCEDURE — 70551 MRI BRAIN STEM W/O DYE: CPT

## 2023-03-27 PROCEDURE — 82746 ASSAY OF FOLIC ACID SERUM: CPT | Performed by: STUDENT IN AN ORGANIZED HEALTH CARE EDUCATION/TRAINING PROGRAM

## 2023-03-27 PROCEDURE — 86850 RBC ANTIBODY SCREEN: CPT | Performed by: STUDENT IN AN ORGANIZED HEALTH CARE EDUCATION/TRAINING PROGRAM

## 2023-03-27 PROCEDURE — 83735 ASSAY OF MAGNESIUM: CPT | Performed by: PHYSICIAN ASSISTANT

## 2023-03-27 PROCEDURE — 70496 CT ANGIOGRAPHY HEAD: CPT

## 2023-03-27 RX ORDER — SODIUM CHLORIDE 0.9 % (FLUSH) 0.9 %
10 SYRINGE (ML) INJECTION AS NEEDED
Status: DISCONTINUED | OUTPATIENT
Start: 2023-03-27 | End: 2023-03-28 | Stop reason: HOSPADM

## 2023-03-27 RX ORDER — SODIUM CHLORIDE 9 MG/ML
1000 INJECTION, SOLUTION INTRAVENOUS ONCE
Status: COMPLETED | OUTPATIENT
Start: 2023-03-27 | End: 2023-03-27

## 2023-03-27 RX ADMIN — SODIUM CHLORIDE 1000 ML: 9 INJECTION, SOLUTION INTRAVENOUS at 19:54

## 2023-03-27 RX ADMIN — IOPAMIDOL 70 ML: 755 INJECTION, SOLUTION INTRAVENOUS at 19:43

## 2023-03-27 NOTE — ED PROVIDER NOTES
[facility]  emergency department encounter        Pt Name: Elise Santamaria  MRN: [id number]  Birthdate 1945  Date of evaluation: 3/27/2023    Chief Complaint   Patient presents with   • Jaw Pain   • Arm Pain   • Numbness             HISTORY OF PRESENT ILLNESS:   Elise Santamaria is a 77 y.o. female PMH HTN, HLD, asthma, nephrolithiasis, GERD.    Patient presents with predominant complaint of pain and numbness to the left side of the face jaw and left arm.  Patient reports onset 1 hour prior to arrival.  Estimated time of onset 5:30 PM.  Patient denies headache vision changes chest pain palpitation shortness of breath diaphoresis and weakness.  Endorses mild nausea without vomiting.  Has had a couple loose stools recently but denies diarrhea.  Additionally denies fever chills dysuria other respiratory complaints.      Stroke pager fired by triage nurse.          PCP: Víctor Rodrigues MD          REVIEW OF SYSTEMS:     Review of Systems   Constitutional: Negative for fever.   HENT: Negative for congestion and rhinorrhea.    Eyes: Negative for visual disturbance.   Respiratory: Negative for cough and shortness of breath.    Cardiovascular: Negative for chest pain.   Gastrointestinal: Positive for nausea. Negative for abdominal pain and vomiting.   Genitourinary: Negative for dysuria.   Musculoskeletal: Negative for arthralgias.   Skin: Negative for rash.   Neurological: Positive for numbness. Negative for weakness and headaches.   Psychiatric/Behavioral: Negative for confusion.       Review of systems otherwise as per HPI.          PREVIOUS HISTORY:         Past Medical History:   Diagnosis Date   • Asthma    • Elevated cholesterol    • Essential hypertension 9/24/2020   • GERD (gastroesophageal reflux disease)    • Hyperlipidemia    • Kidney stones    • Osteoporosis    • Tongue irritation    • Weight loss            Past Surgical History:   Procedure Laterality Date   • CARDIAC  "CATHETERIZATION  2014   • CARDIOVASCULAR STRESS TEST  2014   • CATARACT EXTRACTION, BILATERAL  09/2021   • COLONOSCOPY     • ECHO - CONVERTED  2014   • ENDOSCOPY  2015   • EXTRACORPOREAL SHOCK WAVE LITHOTRIPSY (ESWL) Left 9/13/2019    Procedure: EXTRACORPOREAL SHOCKWAVE LITHOTRIPSY;  Surgeon: Demarco Aldana MD;  Location: [facility];  Service: Urology   • HYSTERECTOMY  2002    benign   • LAPAROSCOPIC CHOLECYSTECTOMY  2001           Social History     Socioeconomic History   • Marital status:    Tobacco Use   • Smoking status: Never   • Smokeless tobacco: Never   Vaping Use   • Vaping Use: Never used   Substance and Sexual Activity   • Alcohol use: No   • Drug use: No   • Sexual activity: Defer           Family History   Problem Relation Age of Onset   • Heart attack Mother    • Heart failure Mother    • Hypertension Mother    • Heart attack Father    • Hypertension Father    • Heart block Brother    • Heart block Sister    • Heart block Sister    • Heart attack Brother    • Heart block Brother    • Stroke Brother    • Breast cancer Neg Hx          Current Outpatient Medications   Medication Instructions   • alendronate (FOSAMAX) 70 mg, Oral, Every 7 Days   • aspirin 81 mg, Oral, Daily   • atorvastatin (LIPITOR) 20 mg, Oral, Daily   • cholecalciferol (VITAMIN D3) 1,000 Units, Oral, Daily   • furosemide (LASIX) 20 mg, Oral, Every 48 Hours PRN   • montelukast (SINGULAIR) 10 mg, Oral, Nightly   • Multiple Vitamin (MULTI VITAMIN DAILY PO) Oral   • omeprazole (PRILOSEC) 40 mg, Oral, Daily   • vitamin B-12 (CYANOCOBALAMIN) 1,000 mcg, Oral, Daily         Allergies:  Latex          PHYSICAL EXAM:     Patient Vitals for the past 24 hrs:   BP Temp Temp src Pulse Resp SpO2 Height Weight   03/27/23 2130 141/69 -- -- 66 -- 100 % -- --   03/27/23 2115 145/76 -- -- 73 -- 93 % -- --   03/27/23 2015 145/72 -- -- 72 -- 99 % -- --   03/27/23 1911 161/89 97.9 °F (36.6 °C) Infrared 77 16 98 % 167.6 cm (66\") 68.5 kg (151 " lb)       Physical Exam  Vitals and nursing note reviewed.   Constitutional:       General: She is not in acute distress.  HENT:      Head: Normocephalic and atraumatic.   Eyes:      Extraocular Movements: Extraocular movements intact.      Conjunctiva/sclera: Conjunctivae normal.      Pupils: Pupils are equal, round, and reactive to light.   Pulmonary:      Effort: Pulmonary effort is normal. No respiratory distress.   Abdominal:      General: Abdomen is flat. There is no distension.   Musculoskeletal:         General: No deformity. Normal range of motion.      Cervical back: Normal range of motion. No rigidity.   Skin:     Coloration: Skin is not jaundiced.      Findings: No rash.   Neurological:      General: No focal deficit present.      Mental Status: She is alert and oriented to person, place, and time.      Comments: Patient alert and fully oriented, capable of appropriately answering questions following commands, blink and  intact.  Extraocular movement intact without nystagmus, visual fields full.  No aphasia or dysarthria, repetition intact, object naming intact.  No drift in either upper or lower extremities.  No ataxia on FTN or HTS.  No facial asymmetry.  Sensation intact light touch in all extremities without asymmetry, no extinction.   Psychiatric:         Mood and Affect: Mood normal.         Behavior: Behavior normal.             COMPLETED DIAGNOSTIC STUDIES AND INTERVENTIONS:     Lab Results (last 24 hours)     Procedure Component Value Units Date/Time    POC Glucose Once [[id number]]  (Normal) Collected: 03/27/23 1917    Specimen: Blood Updated: 03/27/23 1933     Glucose 100 mg/dL      Comment: Meter: [id number] : [id number] ALONZO PRUITTIDA       POC Creatinine [[id number]]  (Normal) Collected: 03/27/23 1926    Specimen: Blood Updated: 03/27/23 2011     Creatinine 1.20 mg/dL      Comment: Serial Number: [id number]:  [id number]       CBC & Differential [[id number]]  (Normal) Collected: 03/27/23  1936    Specimen: Blood Updated: 03/27/23 1938    Narrative:      The following orders were created for panel order CBC & Differential.  Procedure                               Abnormality         Status                     ---------                               -----------         ------                     CBC Auto Differential[[id number]]        Normal              Final result                 Please view results for these tests on the individual orders.    CBC Auto Differential [[id number]]  (Normal) Collected: 03/27/23 1936    Specimen: Blood Updated: 03/27/23 1938     WBC 6.22 10*3/mm3      RBC 4.46 10*6/mm3      Hemoglobin 13.9 g/dL      Hematocrit 43.0 %      MCV 96.4 fL      MCH 31.2 pg      MCHC 32.3 g/dL      RDW 12.6 %      RDW-SD 44.9 fl      MPV 10.2 fL      Platelets 212 10*3/mm3      Neutrophil % 61.6 %      Lymphocyte % 27.7 %      Monocyte % 7.1 %      Eosinophil % 2.4 %      Basophil % 1.0 %      Immature Grans % 0.2 %      Neutrophils, Absolute 3.84 10*3/mm3      Lymphocytes, Absolute 1.72 10*3/mm3      Monocytes, Absolute 0.44 10*3/mm3      Eosinophils, Absolute 0.15 10*3/mm3      Basophils, Absolute 0.06 10*3/mm3      Immature Grans, Absolute 0.01 10*3/mm3      nRBC 0.0 /100 WBC     Lactic Acid, Plasma [[id number]]  (Normal) Collected: 03/27/23 1936    Specimen: Blood Updated: 03/27/23 1955     Lactate 0.7 mmol/L     Hemoglobin A1c [[id number]]  (Normal) Collected: 03/27/23 1936    Specimen: Blood Updated: 03/27/23 2324     Hemoglobin A1C 5.20 %     Narrative:      Hemoglobin A1C Ranges:    Increased Risk for Diabetes  5.7% to 6.4%  Diabetes                     >= 6.5%  Diabetic Goal                < 7.0%    Protime-INR [[id number]]  (Normal) Collected: 03/27/23 1948    Specimen: Blood from Arm, Right Updated: 03/27/23 2003     Protime 12.7 Seconds      INR 0.94    Narrative:      Suggested INR therapeutic range for stable oral anticoagulant therapy:    Low Intensity therapy:   1.5-2.0  Moderate  Intensity therapy:   2.0-3.0  High Intensity therapy:   2.5-4.0    aPTT [[id number]]  (Abnormal) Collected: 03/27/23 1948    Specimen: Blood from Arm, Right Updated: 03/27/23 2003     PTT 23.3 seconds     Narrative:      PTT Heparin Therapeutic Range:  59 - 95 seconds      Comprehensive Metabolic Panel [[id number]]  (Abnormal) Collected: 03/27/23 2000    Specimen: Blood from Arm, Left Updated: 03/27/23 2030     Glucose 104 mg/dL      BUN 13 mg/dL      Creatinine 1.02 mg/dL      Sodium 141 mmol/L      Potassium 4.6 mmol/L      Chloride 107 mmol/L      CO2 25.2 mmol/L      Calcium 9.4 mg/dL      Total Protein 6.2 g/dL      Albumin 3.9 g/dL      ALT (SGPT) 20 U/L      AST (SGOT) 18 U/L      Alkaline Phosphatase 69 U/L      Total Bilirubin 0.5 mg/dL      Globulin 2.3 gm/dL      A/G Ratio 1.7 g/dL      BUN/Creatinine Ratio 12.7     Anion Gap 8.8 mmol/L      eGFR 56.4 mL/min/1.73     Narrative:      GFR Normal >60  Chronic Kidney Disease <60  Kidney Failure <15    The GFR formula is only valid for adults with stable renal function between ages 18 and 70.    Magnesium [[id number]]  (Normal) Collected: 03/27/23 2000    Specimen: Blood from Arm, Left Updated: 03/27/23 2030     Magnesium 1.9 mg/dL     High Sensitivity Troponin T [[id number]]  (Abnormal) Collected: 03/27/23 2000    Specimen: Blood from Arm, Left Updated: 03/27/23 2030     HS Troponin T 30 ng/L     Narrative:      High Sensitive Troponin T Reference Range:  <10.0 ng/L- Negative Female for AMI  <15.0 ng/L- Negative Male for AMI  >=10 - Abnormal Female indicating possible myocardial injury.  >=15 - Abnormal Male indicating possible myocardial injury.   Clinicians would have to utilize clinical acumen, EKG, Troponin, and serial changes to determine if it is an Acute Myocardial Infarction or myocardial injury due to an underlying chronic condition.         Single High Sensitivity Troponin T [[id number]]  (Abnormal) Collected: 03/27/23 2000    Specimen: Blood from Arm,  Left Updated: 03/27/23 2030     HS Troponin T 30 ng/L     Narrative:      High Sensitive Troponin T Reference Range:  <10.0 ng/L- Negative Female for AMI  <15.0 ng/L- Negative Male for AMI  >=10 - Abnormal Female indicating possible myocardial injury.  >=15 - Abnormal Male indicating possible myocardial injury.   Clinicians would have to utilize clinical acumen, EKG, Troponin, and serial changes to determine if it is an Acute Myocardial Infarction or myocardial injury due to an underlying chronic condition.         COVID PRE-OP / PRE-PROCEDURE SCREENING ORDER (NO ISOLATION) - Swab, Nasopharynx [[id number]]  (Normal) Collected: 03/27/23 2002    Specimen: Swab from Nasopharynx Updated: 03/27/23 2025    Narrative:      The following orders were created for panel order COVID PRE-OP / PRE-PROCEDURE SCREENING ORDER (NO ISOLATION) - Swab, Nasopharynx.  Procedure                               Abnormality         Status                     ---------                               -----------         ------                     COVID-19 and FLU A/B PCR...[[id number]]  Normal              Final result                 Please view results for these tests on the individual orders.    COVID-19 and FLU A/B PCR - Swab, Nasopharynx [[id number]]  (Normal) Collected: 03/27/23 2002    Specimen: Swab from Nasopharynx Updated: 03/27/23 2025     COVID19 Not Detected     Influenza A PCR Not Detected     Influenza B PCR Not Detected    Narrative:      Fact sheet for providers: https://www.fda.gov/media/761384/download    Fact sheet for patients: https://www.fda.gov/media/758151/download    Test performed by PCR.    High Sensitivity Troponin T 2Hr [[id number]]  (Abnormal) Collected: 03/27/23 2239    Specimen: Blood from Arm, Left Updated: 03/27/23 2302     HS Troponin T 25 ng/L      Troponin T Delta -5 ng/L     Narrative:      High Sensitive Troponin T Reference Range:  <10.0 ng/L- Negative Female for AMI  <15.0 ng/L- Negative Male for AMI  >=10 -  Abnormal Female indicating possible myocardial injury.  >=15 - Abnormal Male indicating possible myocardial injury.   Clinicians would have to utilize clinical acumen, EKG, Troponin, and serial changes to determine if it is an Acute Myocardial Infarction or myocardial injury due to an underlying chronic condition.         Lipid Panel [[id number]] Collected: 03/27/23 2239    Specimen: Blood from Arm, Left Updated: 03/27/23 2323     Total Cholesterol 137 mg/dL      Triglycerides 104 mg/dL      HDL Cholesterol 48 mg/dL      LDL Cholesterol  70 mg/dL      VLDL Cholesterol 19 mg/dL      LDL/HDL Ratio 1.42    Narrative:      Cholesterol Reference Ranges  (U.S. Department of Health and Human Services ATP III Classifications)    Desirable          <200 mg/dL  Borderline High    200-239 mg/dL  High Risk          >240 mg/dL      Triglyceride Reference Ranges  (U.S. Department of Health and Human Services ATP III Classifications)    Normal           <150 mg/dL  Borderline High  150-199 mg/dL  High             200-499 mg/dL  Very High        >500 mg/dL    HDL Reference Ranges  (U.S. Department of Health and Human Services ATP III Classifications)    Low     <40 mg/dl (major risk factor for CHD)  High    >60 mg/dl ('negative' risk factor for CHD)        LDL Reference Ranges  (U.S. Department of Health and Human Services ATP III Classifications)    Optimal          <100 mg/dL  Near Optimal     100-129 mg/dL  Borderline High  130-159 mg/dL  High             160-189 mg/dL  Very High        >189 mg/dL    Vitamin B12 [[id number]] Collected: 03/27/23 2239    Specimen: Blood from Arm, Left Updated: 03/27/23 2310    Folate [[id number]] Collected: 03/27/23 2239    Specimen: Blood from Arm, Left Updated: 03/27/23 2309            MRI Brain Without Contrast   Final Result      No acute infarct, hemorrhage, mass effect, or hydrocephalus.      Signer Name: Alejandro Joe MD    Signed: 3/27/2023 9:23 PM    Workstation Name: [facility]     [facility]      CT Angiogram Neck   Final Result      CTA brain:   No flow-limiting stenosis or occlusion.      CTA neck:   1.  Severe atherosclerotic narrowing of the origin of the left vertebral artery with robust flow distally. Mild atherosclerotic irregularity of the left cervical vertebral artery without flow-limiting stenosis or occlusion.   2.  Mild undulating mural irregularity of the height cervical internal carotid arteries which may reflect atherosclerosis or nonatherosclerotic angiopathy such as fibromuscular dysplasia. No associated stenosis or occlusion.      Signer Name: Alejandro Joe MD    Signed: 3/27/2023 8:44 PM    Workstation Name: [id number]     [facility]      CT Angiogram Head w AI Analysis of LVO   Final Result      CTA brain:   No flow-limiting stenosis or occlusion.      CTA neck:   1.  Severe atherosclerotic narrowing of the origin of the left vertebral artery with robust flow distally. Mild atherosclerotic irregularity of the left cervical vertebral artery without flow-limiting stenosis or occlusion.   2.  Mild undulating mural irregularity of the height cervical internal carotid arteries which may reflect atherosclerosis or nonatherosclerotic angiopathy such as fibromuscular dysplasia. No associated stenosis or occlusion.      Signer Name: Alejandro Joe MD    Signed: 3/27/2023 8:44 PM    Workstation Name: [id number]     [facility]      CT Head Without Contrast Stroke Protocol   Final Result      1. No evidence for acute intracranial hemorrhage mass effect or acute cortical infarct. There is mild generalized atrophy and there are areas of white matter low-attenuation and small areas of low-attenuation the bilateral basal ganglia and thalami that   are nonspecific but most likely due to age-indeterminate small vessel disease. If there is clinical concern for acute CVA, follow-up imaging is recommended.      Signer Name: Camille Lyn  MD    Signed: 3/27/2023 7:25 PM    Workstation Name: [facility]     [facility] [place]      XR Chest 1 View   Final Result   No radiographic evidence of acute cardiac or pulmonary disease.       This report was finalized on 3/27/2023 7:01 PM by Dr. John Jennings MD.                New Medications Ordered This Visit   Medications   • sodium chloride 0.9 % flush 10 mL   • sodium chloride 0.9 % infusion 1,000 mL   • iopamidol (ISOVUE-370) 76 % injection 100 mL         Procedures            MEDICAL DECISION-MAKING AND ED COURSE:     ED Course as of 03/27/23 2324   Mon Mar 27, 2023   1955 MDM: 69-year-old female with risk factors including HTN, HLD presents for left-sided arm and jaw pain associated subjective numbness without any objective findings.  NIH score 1 for subjective sensation numbness.  No other symptoms typical of ACS.  CT brain noncontrast and CTAs ordered.  CT perfusion held due to low NIH.  Risks of tPA and thrombectomy would outweigh benefits.  Case discussed with neurologist Dr. Gilliam who will follow and evaluate patient. []   1956 CT Head Without Contrast Stroke Protocol  No evidence for acute intracranial hemorrhage mass effect or acute cortical infarct. There is mild generalized atrophy and there are areas of white matter low-attenuation and small areas of low-attenuation the bilateral basal ganglia and thalami that  are nonspecific but most likely due to age-indeterminate small vessel disease. If there is clinical concern for acute CVA, follow-up imaging is recommended. []   1956 XR Chest 1 View  No radiographic evidence of acute cardiac or pulmonary disease. []   1956 EKG at 1912 hrs. sinus rhythm with single PVC, 85 bpm, , QRS 70, QTc 447, regular axis, no significant ST deviation or T wave abnormalities concerning for acute ischemia. []   1956 WBC: 6.22 []   1957 Hemoglobin: 13.9 []   1957 Lactate: 0.7 []   2319 HS Troponin T(!): 30 [KP]   2319 HS Troponin  T(!): 25 [KP]   2319 Troponin T Delta(!): -5 [KP]   2319 Creatinine(!): 1.02 []   2320 Patient reevaluated.  Reports her symptoms have fully resolved after fluid bolus.  Patient feels completely baseline.  Patient had no typical symptoms of ACS.  CT and MRI imaging negative, no evidence of stroke and symptoms not highly convincing of stroke.  Discussed findings with neurologist Dr. Gilliam who agrees patient may follow-up outpatient for further work-up, continue atorvastatin and aspirin.  Additional labs ordered.  Discussed case with cardiologist Dr. Law who agreed to plan for outpatient stress test.  Discussed all findings and recommendations with the patient who is agreeable to this plan.  All questions answered. []      ED Course User Index  [] Jose Mena MD       ?      FINAL IMPRESSION:       1. Left arm pain    2. Left arm numbness    3. Jaw pain    4. Elevated troponin         The complaints listed here are new problems to this examiner.       Medication List      No changes were made to your prescriptions during this visit.         FOLLOW-UP  Víctor Rodrigues MD  [address] [facility] [address]  [phone]    Schedule an appointment as soon as possible for a visit       PATIENT CONNECTION - [place]  See Provider List  [facility] [address]  [phone]    If you do not have a primary care provider, call the attached number to schedule an appointment and establish care with a primary care provider., If symptoms worsen    [facility] Emergency Department  [address] [facility] [id number] [phone]    If symptoms worsen      DISPOSITION  ED Disposition     ED Disposition   Discharge    Condition   Stable    Comment   --                 Please note that portions of this note were completed with a voice recognition program.      Jose Mena MD  23:24 EDT  3/27/2023             Jose Mena MD  03/27/23 6519

## 2023-03-27 NOTE — ED NOTES
MEDICAL SCREENING:    Reason for Visit: left arm/left sided neck pain; left sided facial numbness     Patient initially seen in triage.  The patient was advised further evaluation and diagnostic testing will be needed, some of the treatment and testing will be initiated in the lobby in order to begin the process.  The patient will be returned to the waiting area for the time being and possibly be re-assessed by a subsequent ED provider.  The patient will be brought back to the treatment area in as timely manner as possible.       Corie Ramirez PA  03/27/23 2030

## 2023-03-28 LAB
FOLATE SERPL-MCNC: 7.61 NG/ML (ref 4.78–24.2)
QT INTERVAL: 376 MS
QTC INTERVAL: 447 MS
VIT B12 BLD-MCNC: 1542 PG/ML (ref 211–946)

## 2023-03-28 NOTE — DISCHARGE INSTRUCTIONS
Imaging did not show evidence of acute stroke.  Mild elevation in your heart enzymes.  Recommend follow-up with your cardiologist and primary care provider for further evaluation work-up.  Neurologist recommended an echocardiogram which is an ultrasound of your heart.  Also you are referred for a stress test and should receive a call back regarding stress test for your heart.  Continue your atorvastatin and aspirin.  Do not hesitate to return here for any new onset or worsening symptoms.

## 2023-03-28 NOTE — CONSULTS
Teleneurology Consultation Note - Video:    Date of Consultation: 03/27/2023 19:34 EDT  Reason for consult: Left sided numbness  Location: ED  Last known well Date/Time: 03/27/2023 17:30 EDT  ED arrival Date/Time: 03/27/2023 19:21 EDT  Date/Time Telestroke doctor on phone: 03/27/2023 19:34 EDT  Date/Time Telestroke doctor on video: 03/27/2023 19:34 EDT  Diagnosis: Acute ischemic stroke      History of Presenting Illness:  78 yo woman with history of HTN, HLD, GERD presenting with sudden onset left arm and face numbness and chest discomfort. Ms. Santamaria reports she was in her usual state of health preparing dinner this evening when at 5:30pm she experienced numbness of her left arm that migrated to her face. She denies weakness. No headache. No vision changes. She also endorses a non specific discomfort in her chest and jaw. The numbness in her arm and face is persistent. No recent illnesses. No previous similar episodes. She takes a daily aspirin 81mg but is unsure of the indication.     I have reviewed the chart for pertinent medical, surgical and social history.  Allergies: Unknown  Antiplatelet prior to arrival? Aspirin  NIHSS: 1   Initial assessment: 03/27/2023 19:34 EDT  Ms. Santamaria is seen sitting up in bed   Patient provides all their own medical history   Alert and oriented to exact date and year 2023   Comprehension, naming and repetition are intact   Extraocular eye movements in tact without nystagmus   No visual field cut   Face symmetric   No drift in bilateral upper extremities  No drift in bilateral lower extremities   Reports diminished sensation to light touch in left face and arm   Finger-nose-finger intact bilaterally     Personal review of CNS Imaging:  CTH performed? Yes  CT Interpretation date and time: 03/27/2023 19:34 EDT  Hemorrhage vs non- hemorrhage? No acute intracranial hemorrhage  Type of No acute intracranial hemorrhage:  Left hemisphere  ASPECT SCORE: 10        Assessment and Plan:  77  yo woman with sudden onset of left face and arm numbness. Given association with chest discomfort and jaw pain recommend simultaneous cardiac work up by primary team. Initial CT/CTa without acute findings. Discussed with patient that isolated numbness to face and arm nondisabling so would defer thrombolytics at this time.     Recommendations:  - Admit to Teleunit  - [ ] cardiac work up for jaw pain and chest discomfort per primary team   - MRI brain without contrast  - TTE   - Lipid profile and A1C  - B12, Folate, UA  - defer blood pressure recommendations until cardiac evaluation complete. otherwise aim for normotension   - Continue home aspirin, if small area of ischemia on MRI can load with plavix 300mg and continue plavix 75mg for 21 days ( END DATE OF PLAVIX April 17th)   - Continue home atorvastatin 20 and adjust according to LDL level  - Stroke education to patient and family  - PT/OT and speech evaluation and treatment  - DVT PPX    Follow-up recommendations:   In this patient with acute neurologic deficits,   we would recommend that the inpatient neurology team be consulted for further ongoing recommendations.   Please be sure to place the consult through EMR.    Updated ED provider with recommendations at: 03/27/2023 20:12 EDT              Teleneurology patient verification & consent    I have proceeded with this evaluation at the direct request of the referring practitioner as this is felt to be an emergency setting and no appropriate specialist is available at bedside to perform these evaluations. The [facility] ([facility]) practitioner has reported to me this is the correct patient and has obtained verbal consent from the patient/surrogate to perform this voluntary telemedicine evaluation (including obtaining history, performing examination and reviewing data provided by the patient and/or originating site care provider). I attest that I introduced myself to the patient, provided  my credentials, disclosed my location, and determined that, based on review of the patient's chart and discussion with the patient's primary team, telemedicine via a HIPAA-compliant, real-time, face-to-face, two-way, interactive audio and video platform is an appropriate and effective means of providing this service. The patient/surrogate has the right to refuse this evaluation as they have been explained risks (including potential loss of confidentiality), benefits, alternatives, and the potential need for subsequent face to face care. Patient/surrogate understands that there is a risk of medical inaccuracies given that our recommendations will be made based on reported data (and we must therefore assume this information is accurate). Knowing that there is a risk that this information is not reported accurately, and that the telemedicine video, audio, or data feed may be incomplete, the patient agrees to proceed with evaluation and holds us harmless knowing these risks. In this evaluation, we will be providing recommendations only. The ultimate decision to follow, or not follow, these recommendations will be left to the bedside treating/requesting practitioner. The patient/surrogate has been notified that other healthcare professionals (including technical personnel) may be involved in this audio-video evaluation. All laws concerning confidentiality and patient access to medical records and copies of medical records apply to telemedicine. The patient/surrogate has received the originating site's Health Notice of Privacy Practices.    Medical Data Reviewed:  Data reviewed include clinical labs, radiology, medical test;  Obtaining/reviewing old medical records;  Obtaining case history from another source;  Independent review of CNS images    IDr. Osiris, saw patient on 03/27/2023, 19:34 EDT for an initial in-patient or emergency room telemedicine face-to-face consult using interactive technology and  have spent  greater than 30 minutes of time on the care of the patient today including time spent reviewing medical records, reviewing laboratory data, radiology, and other diagnostic tests, coordinating care with ancillary staff in addition to examining the patient and formulating a plan of care, discussing and counseling the patient and family. Greater than 50 percent of this time was spent in face to face and coordination of care. The location of the patient was at [facility]. My location was [place]. I was assisted with the exam by Nurse

## 2023-03-29 ENCOUNTER — HOSPITAL ENCOUNTER (OUTPATIENT)
Dept: MAMMOGRAPHY | Facility: HOSPITAL | Age: 78
Discharge: HOME | End: 2023-03-29
Admitting: FAMILY MEDICINE
Payer: MEDICARE

## 2023-03-29 DIAGNOSIS — Z12.31 SCREENING MAMMOGRAM FOR BREAST CANCER: ICD-10-CM

## 2023-03-29 PROCEDURE — 77063 BREAST TOMOSYNTHESIS BI: CPT | Performed by: RADIOLOGY

## 2023-03-29 PROCEDURE — 77063 BREAST TOMOSYNTHESIS BI: CPT

## 2023-03-29 PROCEDURE — 77067 SCR MAMMO BI INCL CAD: CPT

## 2023-03-29 PROCEDURE — 77067 SCR MAMMO BI INCL CAD: CPT | Performed by: RADIOLOGY

## 2023-04-14 ENCOUNTER — OFFICE VISIT (OUTPATIENT)
Dept: CARDIOLOGY | Facility: CLINIC | Age: 78
End: 2023-04-14
Payer: MEDICARE

## 2023-04-14 VITALS
DIASTOLIC BLOOD PRESSURE: 54 MMHG | OXYGEN SATURATION: 99 % | HEIGHT: 66 IN | BODY MASS INDEX: 24.11 KG/M2 | WEIGHT: 150 LBS | HEART RATE: 72 BPM | SYSTOLIC BLOOD PRESSURE: 132 MMHG

## 2023-04-14 DIAGNOSIS — R77.8 ELEVATED TROPONIN: ICD-10-CM

## 2023-04-14 DIAGNOSIS — E78.2 MIXED HYPERLIPIDEMIA: ICD-10-CM

## 2023-04-14 DIAGNOSIS — I35.1 NONRHEUMATIC AORTIC VALVE INSUFFICIENCY: ICD-10-CM

## 2023-04-14 DIAGNOSIS — R07.9 CHEST PAIN IN ADULT: ICD-10-CM

## 2023-04-14 DIAGNOSIS — R07.9 CHEST PAIN IN ADULT: Primary | ICD-10-CM

## 2023-04-14 PROBLEM — R79.89 ELEVATED TROPONIN: Status: ACTIVE | Noted: 2023-04-14

## 2023-04-14 PROBLEM — G89.29 CHRONIC CHEST PAIN WITH LOW TO MODERATE RISK FOR CAD: Status: ACTIVE | Noted: 2018-07-10

## 2023-04-14 PROBLEM — Z91.89 CHRONIC CHEST PAIN WITH LOW TO MODERATE RISK FOR CAD: Status: ACTIVE | Noted: 2018-07-10

## 2023-04-14 PROCEDURE — 93000 ELECTROCARDIOGRAM COMPLETE: CPT | Performed by: INTERNAL MEDICINE

## 2023-04-14 PROCEDURE — 99214 OFFICE O/P EST MOD 30 MIN: CPT | Performed by: INTERNAL MEDICINE

## 2023-04-14 NOTE — PROGRESS NOTES
subjective     Chief Complaint   Patient presents with   • Hospital Follow Up Visit      ED 3-27-23     History of Present Illness    Patient is 77 years old white female who is here for cardiology follow-up.  She went to the emergency room on March 27 this year around 2 and half weeks ago with pain in the left arm left side of the neck and some chest discomfort.  She went to the emergency room where extensive work-up was done for stroke evaluation.  CT scan of the brain, MRI of the brain CT angiogram of the neck and head were obtained.  She had some atherosclerosis but no evidence of stroke.  Troponin was mildly elevated and patient was advised to see cardiology follow-up.  An EKG did not show any acute changes.    She is doing very well now denies any symptoms.  She stated that her arm pain and mild chest discomfort occurred at rest.  She has no complaint with exertion currently she is asymptomatic.          Past Surgical History:   Procedure Laterality Date   • CARDIAC CATHETERIZATION  2014   • CARDIOVASCULAR STRESS TEST  2014   • CATARACT EXTRACTION, BILATERAL  09/2021   • COLONOSCOPY     • ECHO - CONVERTED  2014   • ENDOSCOPY  2015   • EXTRACORPOREAL SHOCK WAVE LITHOTRIPSY (ESWL) Left 9/13/2019    Procedure: EXTRACORPOREAL SHOCKWAVE LITHOTRIPSY;  Surgeon: Demarco Aldana MD;  Location: [facility];  Service: Urology   • HYSTERECTOMY  2002    benign   • LAPAROSCOPIC CHOLECYSTECTOMY  2001     Family History   Problem Relation Age of Onset   • Heart attack Mother    • Heart failure Mother    • Hypertension Mother    • Heart attack Father    • Hypertension Father    • Heart block Brother    • Heart block Sister    • Heart block Sister    • Heart attack Brother    • Heart block Brother    • Stroke Brother    • Breast cancer Neg Hx      Past Medical History:   Diagnosis Date   • Asthma    • Elevated cholesterol    • Essential hypertension 9/24/2020   • GERD (gastroesophageal reflux disease)    •  Hyperlipidemia    • Kidney stones    • Osteoporosis    • Tongue irritation    • Weight loss      Patient Active Problem List   Diagnosis   • Mixed hyperlipidemia   • Osteoporosis   • Ganglion cyst   • Gastroesophageal reflux disease without esophagitis   • Vitamin D deficiency   • Continuous leakage of urine   • Cough   • Environmental allergies   • URI, acute   • Chronic chest pain with low to moderate risk for CAD   • Xerostomia   • Chronic fatigue   • Cutaneous candidiasis   • Renal calculus, left   • Screening for breast cancer   • Screening for osteoporosis   • Palpitations   • Nonrheumatic mild aortic valve insufficiency and mild tricuspid regurgitation 2018.   • Allergic contact dermatitis due to plants, except food   • Bilateral lower extremity edema   • Tick bite of thoracic wall   • Elevated troponin       Social History     Tobacco Use   • Smoking status: Never   • Smokeless tobacco: Never   Vaping Use   • Vaping Use: Never used   Substance Use Topics   • Alcohol use: No   • Drug use: No       Allergies   Allergen Reactions   • Latex Other (See Comments)     Blisters on hands       Current Outpatient Medications on File Prior to Visit   Medication Sig   • alendronate (FOSAMAX) 70 MG tablet Take 1 tablet by mouth Every 7 (Seven) Days.   • aspirin 81 MG EC tablet Take 1 tablet by mouth Daily.   • atorvastatin (LIPITOR) 20 MG tablet Take 1 tablet by mouth Daily.   • cholecalciferol (VITAMIN D3) 1000 UNITS tablet Take 1 tablet by mouth Daily.   • furosemide (LASIX) 20 MG tablet Take 1 tablet by mouth Every Other Day As Needed (edema).   • montelukast (SINGULAIR) 10 MG tablet TAKE 1 TABLET BY MOUTH EVERY NIGHT   • Multiple Vitamin (MULTI VITAMIN DAILY PO) Take  by mouth.   • omeprazole (priLOSEC) 40 MG capsule Take 1 capsule by mouth Daily.   • vitamin B-12 (CYANOCOBALAMIN) 1000 MCG tablet Take 1 tablet by mouth Daily.     No current facility-administered medications on file prior to visit.         The  "following portions of the patient's history were reviewed and updated as appropriate: allergies, current medications, past family history, past medical history, past social history, past surgical history and problem list.    Review of Systems   Constitutional: Negative.   HENT: Negative.  Negative for congestion.    Eyes: Negative.    Cardiovascular: Positive for chest pain. Negative for cyanosis, dyspnea on exertion, irregular heartbeat, leg swelling, near-syncope, orthopnea, palpitations, paroxysmal nocturnal dyspnea and syncope.   Respiratory: Negative.  Negative for shortness of breath.    Hematologic/Lymphatic: Negative.    Musculoskeletal: Negative.    Gastrointestinal: Negative.    Neurological: Negative.  Negative for headaches.          Objective:     /54 (BP Location: Left arm, Patient Position: Sitting, Cuff Size: Adult)   Pulse 72   Ht 167.6 cm (66\")   Wt 68 kg (150 lb)   SpO2 99%   BMI 24.21 kg/m²   Pulmonary:      Effort: Pulmonary effort is normal.      Breath sounds: Normal breath sounds. No stridor. No wheezing. No rhonchi. No rales.   Cardiovascular:      PMI at left midclavicular line. Normal rate. Regular rhythm. Normal S1. Normal S2.      Murmurs: There is no murmur.      No gallop. No click. No rub.   Pulses:     Intact distal pulses.   Edema:     Peripheral edema absent.           Lab Review  Lab Results   Component Value Date     03/27/2023    K 4.6 03/27/2023     03/27/2023    BUN 13 03/27/2023    CREATININE 1.02 (H) 03/27/2023    GLUCOSE 104 (H) 03/27/2023    CALCIUM 9.4 03/27/2023    ALT 20 03/27/2023    ALKPHOS 69 03/27/2023    LABIL2 1.7 05/09/2016     Lab Results   Component Value Date    CKTOTAL 127 07/01/2022     Lab Results   Component Value Date    WBC 6.22 03/27/2023    HGB 13.9 03/27/2023    HCT 43.0 03/27/2023     03/27/2023     Lab Results   Component Value Date    INR 0.94 03/27/2023    INR 0.95 05/19/2014     Lab Results   Component Value Date    " MG 1.9 03/27/2023     Lab Results   Component Value Date    TSH 2.389 10/27/2016     No results found for: BNP  Lab Results   Component Value Date    CHLPL 195 05/09/2016    CHOL 137 03/27/2023    TRIG 104 03/27/2023    HDL 48 03/27/2023    VLDL 19 03/27/2023    LDLHDL 1.42 03/27/2023     Lab Results   Component Value Date    LDL 70 03/27/2023    LDL 74 07/01/2022     No results found for: PROBNP              ECG 12 Lead    Date/Time: 4/14/2023 12:27 PM  Performed by: Víctor Rodrigues MD  Authorized by: Víctor Rodrigues MD   Comparison: compared with previous ECG from 3/27/2023  Similar to previous ECG  Comparison to previous ECG: PAC is new, no PVCs detected  Rhythm: sinus rhythm  Ectopy: atrial premature contractions  Rate: normal  BPM: 69  Conduction: conduction normal  QRS axis: normal  Other findings: non-specific ST-T wave changes    Clinical impression: non-specific ECG               I personally viewed and interpreted the patient's LAB data         Assessment:     1. Chest pain in adult    2. Mixed hyperlipidemia    3. Nonrheumatic mild aortic valve insufficiency and mild tricuspid regurgitation 2018.    4. Elevated troponin          Plan:     Patient is 77 years old white female who was recently seen in the emergency room because of left arm pain neck pain and chest discomfort.  Troponin was mildly elevated with normal EKG.  Chest pain symptom have resolved.  There is no exertional angina.    Further work-up is indicated  We will arrange stress test for further evaluation.  She also has history of aortic regurgitation we will check echocardiogram.    Lab work reviewed hyperlipidemia is well controlled with Lipitor which was continued.    Last stress test was 5 years ago and was negative for ischemia.  Aggressive risk factor modification emphasized.  She will be reevaluated after Lexiscan stress test is done.  She states that she could not walk at this time because of leg weakness.    Follow-up  scheduled        No follow-ups on file.

## 2023-04-14 NOTE — LETTER
April 14, 2023     Jim Vargas DO  [address] [facility] [facility] [place] [address]    Patient: Elise Santamaria   YOB: 1945   Date of Visit: 4/14/2023       Dear Jim Vargas DO    Elise Santamaria was in my office today. Below is a copy of my note.    If you have questions, please do not hesitate to call me. I look forward to following Elise along with you.         Sincerely,        Víctor Rodrigues MD        CC: No Recipients    subjective     Chief Complaint   Patient presents with   • Hospital Follow Up Visit      ED 3-27-23     History of Present Illness          Past Surgical History:   Procedure Laterality Date   • CARDIAC CATHETERIZATION  2014   • CARDIOVASCULAR STRESS TEST  2014   • CATARACT EXTRACTION, BILATERAL  09/2021   • COLONOSCOPY     • ECHO - CONVERTED  2014   • ENDOSCOPY  2015   • EXTRACORPOREAL SHOCK WAVE LITHOTRIPSY (ESWL) Left 9/13/2019    Procedure: EXTRACORPOREAL SHOCKWAVE LITHOTRIPSY;  Surgeon: Demarco Aldana MD;  Location: [facility];  Service: Urology   • HYSTERECTOMY  2002    benign   • LAPAROSCOPIC CHOLECYSTECTOMY  2001     Family History   Problem Relation Age of Onset   • Heart attack Mother    • Heart failure Mother    • Hypertension Mother    • Heart attack Father    • Hypertension Father    • Heart block Brother    • Heart block Sister    • Heart block Sister    • Heart attack Brother    • Heart block Brother    • Stroke Brother    • Breast cancer Neg Hx      Past Medical History:   Diagnosis Date   • Asthma    • Elevated cholesterol    • Essential hypertension 9/24/2020   • GERD (gastroesophageal reflux disease)    • Hyperlipidemia    • Kidney stones    • Osteoporosis    • Tongue irritation    • Weight loss      Patient Active Problem List   Diagnosis   • Mixed hyperlipidemia   • Osteoporosis   • Ganglion cyst   • Gastroesophageal reflux disease without esophagitis   • Vitamin D deficiency   • Continuous leakage of urine   • Cough   •  Environmental allergies   • URI, acute   • Chronic chest pain with low to moderate risk for CAD   • Xerostomia   • Chronic fatigue   • Cutaneous candidiasis   • Renal calculus, left   • Screening for breast cancer   • Screening for osteoporosis   • Palpitations   • Nonrheumatic mild aortic valve insufficiency and mild tricuspid regurgitation 2018.   • Allergic contact dermatitis due to plants, except food   • Bilateral lower extremity edema   • Tick bite of thoracic wall       Social History     Tobacco Use   • Smoking status: Never   • Smokeless tobacco: Never   Vaping Use   • Vaping Use: Never used   Substance Use Topics   • Alcohol use: No   • Drug use: No       Allergies   Allergen Reactions   • Latex Other (See Comments)     Blisters on hands       Current Outpatient Medications on File Prior to Visit   Medication Sig   • alendronate (FOSAMAX) 70 MG tablet Take 1 tablet by mouth Every 7 (Seven) Days.   • aspirin 81 MG EC tablet Take 1 tablet by mouth Daily.   • atorvastatin (LIPITOR) 20 MG tablet Take 1 tablet by mouth Daily.   • cholecalciferol (VITAMIN D3) 1000 UNITS tablet Take 1 tablet by mouth Daily.   • furosemide (LASIX) 20 MG tablet Take 1 tablet by mouth Every Other Day As Needed (edema).   • montelukast (SINGULAIR) 10 MG tablet TAKE 1 TABLET BY MOUTH EVERY NIGHT   • Multiple Vitamin (MULTI VITAMIN DAILY PO) Take  by mouth.   • omeprazole (priLOSEC) 40 MG capsule Take 1 capsule by mouth Daily.   • vitamin B-12 (CYANOCOBALAMIN) 1000 MCG tablet Take 1 tablet by mouth Daily.     No current facility-administered medications on file prior to visit.         The following portions of the patient's history were reviewed and updated as appropriate: allergies, current medications, past family history, past medical history, past social history, past surgical history and problem list.    ROS      Objective:     /54 (BP Location: Left arm, Patient Position: Sitting, Cuff Size: Adult)   Pulse 72   Ht 167.6  "cm (66\")   Wt 68 kg (150 lb)   SpO2 99%   BMI 24.21 kg/m²   Physical Exam      Lab Review  Lab Results   Component Value Date     03/27/2023    K 4.6 03/27/2023     03/27/2023    BUN 13 03/27/2023    CREATININE 1.02 (H) 03/27/2023    GLUCOSE 104 (H) 03/27/2023    CALCIUM 9.4 03/27/2023    ALT 20 03/27/2023    ALKPHOS 69 03/27/2023    LABIL2 1.7 05/09/2016     Lab Results   Component Value Date    CKTOTAL 127 07/01/2022     Lab Results   Component Value Date    WBC 6.22 03/27/2023    HGB 13.9 03/27/2023    HCT 43.0 03/27/2023     03/27/2023     Lab Results   Component Value Date    INR 0.94 03/27/2023    INR 0.95 05/19/2014     Lab Results   Component Value Date    MG 1.9 03/27/2023     Lab Results   Component Value Date    TSH 2.389 10/27/2016     No results found for: BNP  Lab Results   Component Value Date    CHLPL 195 05/09/2016    CHOL 137 03/27/2023    TRIG 104 03/27/2023    HDL 48 03/27/2023    VLDL 19 03/27/2023    LDLHDL 1.42 03/27/2023     Lab Results   Component Value Date    LDL 70 03/27/2023    LDL 74 07/01/2022     No results found for: PROBNP            Procedures       I personally viewed and interpreted the patient's LAB data        Assessment:     1. Chest pain in adult    2. Mixed hyperlipidemia    3. Nonrheumatic mild aortic valve insufficiency and mild tricuspid regurgitation 2018.          Plan:              No follow-ups on file.    "

## 2023-05-18 ENCOUNTER — TRANSCRIBE ORDERS (OUTPATIENT)
Dept: ADMINISTRATIVE | Facility: HOSPITAL | Age: 78
End: 2023-05-18
Payer: MEDICARE

## 2023-05-18 DIAGNOSIS — I65.23 BILATERAL CAROTID ARTERY OCCLUSION: Primary | ICD-10-CM

## 2023-05-26 ENCOUNTER — TELEPHONE (OUTPATIENT)
Dept: CARDIOLOGY | Facility: CLINIC | Age: 78
End: 2023-05-26

## 2023-05-26 ENCOUNTER — HOSPITAL ENCOUNTER (OUTPATIENT)
Dept: CARDIOLOGY | Facility: HOSPITAL | Age: 78
Discharge: HOME | End: 2023-05-26
Payer: MEDICARE

## 2023-05-26 ENCOUNTER — HOSPITAL ENCOUNTER (OUTPATIENT)
Dept: NUCLEAR MEDICINE | Facility: HOSPITAL | Age: 78
Discharge: HOME | End: 2023-05-26
Payer: MEDICARE

## 2023-05-26 DIAGNOSIS — I65.23 BILATERAL CAROTID ARTERY OCCLUSION: ICD-10-CM

## 2023-05-26 DIAGNOSIS — R07.9 CHEST PAIN IN ADULT: ICD-10-CM

## 2023-05-26 LAB
BH CV ECHO MEAS - ACS: 1.7 CM
BH CV ECHO MEAS - AO MAX PG: 7 MMHG
BH CV ECHO MEAS - AO MEAN PG: 4 MMHG
BH CV ECHO MEAS - AO ROOT DIAM: 3.1 CM
BH CV ECHO MEAS - AO V2 MAX: 132 CM/SEC
BH CV ECHO MEAS - AO V2 VTI: 30.9 CM
BH CV ECHO MEAS - EDV(CUBED): 79.5 ML
BH CV ECHO MEAS - EDV(MOD-SP4): 55 ML
BH CV ECHO MEAS - EF(MOD-SP4): 62 %
BH CV ECHO MEAS - ESV(CUBED): 32.8 ML
BH CV ECHO MEAS - ESV(MOD-SP4): 20.9 ML
BH CV ECHO MEAS - FS: 25.6 %
BH CV ECHO MEAS - IVS/LVPW: 0.89 CM
BH CV ECHO MEAS - IVSD: 0.8 CM
BH CV ECHO MEAS - LA DIMENSION: 3.6 CM
BH CV ECHO MEAS - LAT PEAK E' VEL: 7.6 CM/SEC
BH CV ECHO MEAS - LV DIASTOLIC VOL/BSA (35-75): 31.1 CM2
BH CV ECHO MEAS - LV MASS(C)D: 114.2 GRAMS
BH CV ECHO MEAS - LV SYSTOLIC VOL/BSA (12-30): 11.8 CM2
BH CV ECHO MEAS - LVIDD: 4.3 CM
BH CV ECHO MEAS - LVIDS: 3.2 CM
BH CV ECHO MEAS - LVOT AREA: 3.1 CM2
BH CV ECHO MEAS - LVOT DIAM: 2 CM
BH CV ECHO MEAS - LVPWD: 0.9 CM
BH CV ECHO MEAS - MED PEAK E' VEL: 6.6 CM/SEC
BH CV ECHO MEAS - MV A MAX VEL: 106 CM/SEC
BH CV ECHO MEAS - MV E MAX VEL: 79.8 CM/SEC
BH CV ECHO MEAS - MV E/A: 0.75
BH CV ECHO MEAS - PA ACC TIME: 0.07 SEC
BH CV ECHO MEAS - PA PR(ACCEL): 46.6 MMHG
BH CV ECHO MEAS - RAP SYSTOLE: 10 MMHG
BH CV ECHO MEAS - RVSP: 33.8 MMHG
BH CV ECHO MEAS - SI(MOD-SP4): 19.3 ML/M2
BH CV ECHO MEAS - SV(MOD-SP4): 34.1 ML
BH CV ECHO MEAS - TAPSE (>1.6): 2.27 CM
BH CV ECHO MEAS - TR MAX PG: 23.8 MMHG
BH CV ECHO MEAS - TR MAX VEL: 244 CM/SEC
BH CV ECHO MEASUREMENTS AVERAGE E/E' RATIO: 11.24
BH CV NUCLEAR PRIOR STUDY: 3
BH CV REST NUCLEAR ISOTOPE DOSE: 10.2 MCI
BH CV STRESS BP STAGE 1: NORMAL
BH CV STRESS COMMENTS STAGE 1: NORMAL
BH CV STRESS DOSE REGADENOSON STAGE 1: 0.4
BH CV STRESS DURATION MIN STAGE 1: 0
BH CV STRESS DURATION SEC STAGE 1: 10
BH CV STRESS HR STAGE 1: 86
BH CV STRESS NUCLEAR ISOTOPE DOSE: 30.2 MCI
BH CV STRESS PROTOCOL 1: NORMAL
BH CV STRESS RECOVERY BP: NORMAL MMHG
BH CV STRESS RECOVERY HR: 82 BPM
BH CV STRESS STAGE 1: 1
LEFT ATRIUM VOLUME INDEX: 22.8 ML/M2
LV EF NUC BP: 73 %
MAXIMAL PREDICTED HEART RATE: 142 BPM
MAXIMAL PREDICTED HEART RATE: 142 BPM
PERCENT MAX PREDICTED HR: 60.56 %
STRESS BASELINE BP: NORMAL MMHG
STRESS BASELINE HR: 61 BPM
STRESS PERCENT HR: 71 %
STRESS POST PEAK BP: NORMAL MMHG
STRESS POST PEAK HR: 86 BPM
STRESS TARGET HR: 121 BPM
STRESS TARGET HR: 121 BPM

## 2023-05-26 PROCEDURE — 93880 EXTRACRANIAL BILAT STUDY: CPT

## 2023-05-26 PROCEDURE — 93306 TTE W/DOPPLER COMPLETE: CPT | Performed by: INTERNAL MEDICINE

## 2023-05-26 PROCEDURE — 93880 EXTRACRANIAL BILAT STUDY: CPT | Performed by: RADIOLOGY

## 2023-05-26 PROCEDURE — 93017 CV STRESS TEST TRACING ONLY: CPT

## 2023-05-26 PROCEDURE — 0 TECHNETIUM SESTAMIBI: Performed by: INTERNAL MEDICINE

## 2023-05-26 PROCEDURE — 78452 HT MUSCLE IMAGE SPECT MULT: CPT

## 2023-05-26 PROCEDURE — 25010000002 REGADENOSON 0.4 MG/5ML SOLUTION: Performed by: INTERNAL MEDICINE

## 2023-05-26 PROCEDURE — A9500 TC99M SESTAMIBI: HCPCS | Performed by: INTERNAL MEDICINE

## 2023-05-26 PROCEDURE — 93306 TTE W/DOPPLER COMPLETE: CPT

## 2023-05-26 RX ORDER — REGADENOSON 0.08 MG/ML
0.4 INJECTION, SOLUTION INTRAVENOUS
Status: COMPLETED | OUTPATIENT
Start: 2023-05-26 | End: 2023-05-26

## 2023-05-26 RX ADMIN — TECHNETIUM TC 99M SESTAMIBI 1 DOSE: 1 INJECTION INTRAVENOUS at 10:49

## 2023-05-26 RX ADMIN — TECHNETIUM TC 99M SESTAMIBI 1 DOSE: 1 INJECTION INTRAVENOUS at 09:10

## 2023-05-26 RX ADMIN — REGADENOSON 0.4 MG: 0.08 INJECTION, SOLUTION INTRAVENOUS at 10:49

## 2023-05-26 NOTE — TELEPHONE ENCOUNTER
Called pt and informed her of the following per Víctor Rodrigues MD   Stress test is normal.  Keep your appointment

## 2023-05-26 NOTE — TELEPHONE ENCOUNTER
----- Message from Víctor Rodrigues MD sent at 5/26/2023  1:25 PM EDT -----  Stress test is normal.  Keep your appointment

## 2023-05-30 ENCOUNTER — OFFICE VISIT (OUTPATIENT)
Dept: CARDIOLOGY | Facility: CLINIC | Age: 78
End: 2023-05-30

## 2023-05-30 VITALS
DIASTOLIC BLOOD PRESSURE: 86 MMHG | HEIGHT: 66 IN | SYSTOLIC BLOOD PRESSURE: 138 MMHG | WEIGHT: 148 LBS | HEART RATE: 74 BPM | BODY MASS INDEX: 23.78 KG/M2 | OXYGEN SATURATION: 98 %

## 2023-05-30 DIAGNOSIS — E78.2 MIXED HYPERLIPIDEMIA: Primary | ICD-10-CM

## 2023-05-30 DIAGNOSIS — I35.1 NONRHEUMATIC AORTIC VALVE INSUFFICIENCY: ICD-10-CM

## 2023-05-30 DIAGNOSIS — R00.2 PALPITATIONS: ICD-10-CM

## 2023-05-30 PROCEDURE — 99214 OFFICE O/P EST MOD 30 MIN: CPT | Performed by: INTERNAL MEDICINE

## 2023-05-30 RX ORDER — METOPROLOL SUCCINATE 25 MG/1
0.5 TABLET, EXTENDED RELEASE ORAL DAILY
COMMUNITY
Start: 2023-04-27

## 2023-05-30 RX ORDER — CLOBETASOL PROPIONATE 0.5 MG/G
OINTMENT TOPICAL
COMMUNITY
Start: 2023-05-17

## 2023-05-30 RX ORDER — SIMVASTATIN 20 MG
20 TABLET ORAL EVERY EVENING
COMMUNITY
Start: 2023-05-02

## 2023-05-30 RX ORDER — NYSTATIN 100000 U/G
OINTMENT TOPICAL
COMMUNITY
Start: 2023-05-12

## 2023-05-30 NOTE — LETTER
May 31, 2023     Jim Vargas DO  [address] [facility] [place] [address]    Patient: Elise Santamaria   YOB: 1945   Date of Visit: 5/30/2023       Dear Jim Vargas DO    Elise Santamaria was in my office today. Below is a copy of my note.    If you have questions, please do not hesitate to call me. I look forward to following Elise along with you.         Sincerely,        Víctor Rodrigues MD        CC: No Recipients    subjective     Chief Complaint   Patient presents with   • Results     Stress test and echo   • Chest Pain     Follow up      History of Present Illness          Past Surgical History:   Procedure Laterality Date   • CARDIAC CATHETERIZATION  2014   • CARDIOVASCULAR STRESS TEST  2014   • CATARACT EXTRACTION, BILATERAL  09/2021   • COLONOSCOPY     • ECHO - CONVERTED  2014   • ENDOSCOPY  2015   • EXTRACORPOREAL SHOCK WAVE LITHOTRIPSY (ESWL) Left 9/13/2019    Procedure: EXTRACORPOREAL SHOCKWAVE LITHOTRIPSY;  Surgeon: Demarco Aldana MD;  Location: [facility];  Service: Urology   • HYSTERECTOMY  2002    benign   • LAPAROSCOPIC CHOLECYSTECTOMY  2001     Family History   Problem Relation Age of Onset   • Heart attack Mother    • Heart failure Mother    • Hypertension Mother    • Heart attack Father    • Hypertension Father    • Heart block Brother    • Heart block Sister    • Heart block Sister    • Heart attack Brother    • Heart block Brother    • Stroke Brother    • Breast cancer Neg Hx      Past Medical History:   Diagnosis Date   • Asthma    • Elevated cholesterol    • Essential hypertension 9/24/2020   • GERD (gastroesophageal reflux disease)    • Hyperlipidemia    • Kidney stones    • Osteoporosis    • Tongue irritation    • Weight loss      Patient Active Problem List   Diagnosis   • Mixed hyperlipidemia   • Osteoporosis   • Ganglion cyst   • Gastroesophageal reflux disease without esophagitis   • Vitamin D deficiency   • Continuous leakage of urine   •  Cough   • Environmental allergies   • URI, acute   • Chronic chest pain with low to moderate risk for CAD   • Xerostomia   • Chronic fatigue   • Cutaneous candidiasis   • Renal calculus, left   • Screening for breast cancer   • Screening for osteoporosis   • Palpitations   • Nonrheumatic mild aortic valve insufficiency and mild tricuspid regurgitation 2018.   • Allergic contact dermatitis due to plants, except food   • Bilateral lower extremity edema   • Tick bite of thoracic wall   • Elevated troponin       Social History     Tobacco Use   • Smoking status: Never   • Smokeless tobacco: Never   Vaping Use   • Vaping Use: Never used   Substance Use Topics   • Alcohol use: No   • Drug use: No       Allergies   Allergen Reactions   • Latex Other (See Comments)     Blisters on hands       Current Outpatient Medications on File Prior to Visit   Medication Sig   • alendronate (FOSAMAX) 70 MG tablet Take 1 tablet by mouth Every 7 (Seven) Days.   • aspirin 81 MG EC tablet Take 1 tablet by mouth Daily.   • cholecalciferol (VITAMIN D3) 1000 UNITS tablet Take 1 tablet by mouth Daily.   • clobetasol (TEMOVATE) 0.05 % ointment    • furosemide (LASIX) 20 MG tablet Take 1 tablet by mouth Every Other Day As Needed (edema).   • metoprolol succinate XL (TOPROL-XL) 25 MG 24 hr tablet Take 0.5 tablets by mouth Daily.   • montelukast (SINGULAIR) 10 MG tablet TAKE 1 TABLET BY MOUTH EVERY NIGHT   • Multiple Vitamin (MULTI VITAMIN DAILY PO) Take  by mouth.   • nystatin (MYCOSTATIN) 036529 UNIT/GM ointment APPLY TOPICALLY TO THE AFFECTED AREA TWICE DAILY FOR YEAST   • omeprazole (priLOSEC) 40 MG capsule Take 1 capsule by mouth Daily.   • simvastatin (ZOCOR) 20 MG tablet Take 1 tablet by mouth Every Evening.   • vitamin B-12 (CYANOCOBALAMIN) 1000 MCG tablet Take 1 tablet by mouth Daily.   • [DISCONTINUED] atorvastatin (LIPITOR) 20 MG tablet Take 1 tablet by mouth Daily.     No current facility-administered medications on file prior to visit.  "        The following portions of the patient's history were reviewed and updated as appropriate: allergies, current medications, past family history, past medical history, past social history, past surgical history and problem list.    ROS      Objective:     /86 (BP Location: Left arm, Patient Position: Sitting, Cuff Size: Adult)   Pulse 74   Ht 167.6 cm (66\")   Wt 67.1 kg (148 lb)   SpO2 98%   BMI 23.89 kg/m²   Physical Exam      Lab Review  Lab Results   Component Value Date     03/27/2023    K 4.6 03/27/2023     03/27/2023    BUN 13 03/27/2023    CREATININE 1.02 (H) 03/27/2023    GLUCOSE 104 (H) 03/27/2023    CALCIUM 9.4 03/27/2023    ALT 20 03/27/2023    ALKPHOS 69 03/27/2023    LABIL2 1.7 05/09/2016     Lab Results   Component Value Date    CKTOTAL 127 07/01/2022     Lab Results   Component Value Date    WBC 6.22 03/27/2023    HGB 13.9 03/27/2023    HCT 43.0 03/27/2023     03/27/2023     Lab Results   Component Value Date    INR 0.94 03/27/2023    INR 0.95 05/19/2014     Lab Results   Component Value Date    MG 1.9 03/27/2023     Lab Results   Component Value Date    TSH 2.389 10/27/2016     No results found for: BNP  Lab Results   Component Value Date    CHLPL 195 05/09/2016    CHOL 137 03/27/2023    TRIG 104 03/27/2023    HDL 48 03/27/2023    VLDL 19 03/27/2023    LDLHDL 1.42 03/27/2023     Lab Results   Component Value Date    LDL 70 03/27/2023    LDL 74 07/01/2022     No results found for: PROBNP            Procedures       I personally viewed and interpreted the patient's LAB data        Assessment:   No diagnosis found.      Plan:              No follow-ups on file.    "

## 2023-05-30 NOTE — PROGRESS NOTES
subjective     Chief Complaint   Patient presents with   • Results     Stress test and echo   • Chest Pain     Follow up      History of Present Illness      77 years old white female who is here for cardiology follow-up.  She has history of hyperlipidemia and mild aortic and tricuspid regurgitation.  She complained of chest pain and underwent cardiac work-up including echo and stress test.  She is here for follow-up.  She denies any chest pain at this time.  She is taking Toprol-XL 12.5 mg daily along with Lasix 20 mg daily, aspirin 81 daily and Zocor 20 mg daily.      Past Surgical History:   Procedure Laterality Date   • CARDIAC CATHETERIZATION  2014   • CARDIOVASCULAR STRESS TEST  2014   • CATARACT EXTRACTION, BILATERAL  09/2021   • COLONOSCOPY     • ECHO - CONVERTED  2014   • ENDOSCOPY  2015   • EXTRACORPOREAL SHOCK WAVE LITHOTRIPSY (ESWL) Left 9/13/2019    Procedure: EXTRACORPOREAL SHOCKWAVE LITHOTRIPSY;  Surgeon: Demarco Aldana MD;  Location: [facility];  Service: Urology   • HYSTERECTOMY  2002    benign   • LAPAROSCOPIC CHOLECYSTECTOMY  2001     Family History   Problem Relation Age of Onset   • Heart attack Mother    • Heart failure Mother    • Hypertension Mother    • Heart attack Father    • Hypertension Father    • Heart block Brother    • Heart block Sister    • Heart block Sister    • Heart attack Brother    • Heart block Brother    • Stroke Brother    • Breast cancer Neg Hx      Past Medical History:   Diagnosis Date   • Asthma    • Elevated cholesterol    • Essential hypertension 9/24/2020   • GERD (gastroesophageal reflux disease)    • Hyperlipidemia    • Kidney stones    • Osteoporosis    • Tongue irritation    • Weight loss      Patient Active Problem List   Diagnosis   • Mixed hyperlipidemia   • Osteoporosis   • Ganglion cyst   • Gastroesophageal reflux disease without esophagitis   • Vitamin D deficiency   • Continuous leakage of urine   • Cough   • Environmental allergies   • URI, acute    • Chronic chest pain with low to moderate risk for CAD   • Xerostomia   • Chronic fatigue   • Cutaneous candidiasis   • Renal calculus, left   • Screening for breast cancer   • Screening for osteoporosis   • Palpitations   • Nonrheumatic mild aortic valve insufficiency and mild tricuspid regurgitation 2018.   • Allergic contact dermatitis due to plants, except food   • Bilateral lower extremity edema   • Tick bite of thoracic wall   • Elevated troponin       Social History     Tobacco Use   • Smoking status: Never   • Smokeless tobacco: Never   Vaping Use   • Vaping Use: Never used   Substance Use Topics   • Alcohol use: No   • Drug use: No       Allergies   Allergen Reactions   • Latex Other (See Comments)     Blisters on hands       Current Outpatient Medications on File Prior to Visit   Medication Sig   • alendronate (FOSAMAX) 70 MG tablet Take 1 tablet by mouth Every 7 (Seven) Days.   • aspirin 81 MG EC tablet Take 1 tablet by mouth Daily.   • cholecalciferol (VITAMIN D3) 1000 UNITS tablet Take 1 tablet by mouth Daily.   • clobetasol (TEMOVATE) 0.05 % ointment    • furosemide (LASIX) 20 MG tablet Take 1 tablet by mouth Every Other Day As Needed (edema).   • metoprolol succinate XL (TOPROL-XL) 25 MG 24 hr tablet Take 0.5 tablets by mouth Daily.   • montelukast (SINGULAIR) 10 MG tablet TAKE 1 TABLET BY MOUTH EVERY NIGHT   • Multiple Vitamin (MULTI VITAMIN DAILY PO) Take  by mouth.   • nystatin (MYCOSTATIN) 963834 UNIT/GM ointment APPLY TOPICALLY TO THE AFFECTED AREA TWICE DAILY FOR YEAST   • omeprazole (priLOSEC) 40 MG capsule Take 1 capsule by mouth Daily.   • simvastatin (ZOCOR) 20 MG tablet Take 1 tablet by mouth Every Evening.   • vitamin B-12 (CYANOCOBALAMIN) 1000 MCG tablet Take 1 tablet by mouth Daily.     No current facility-administered medications on file prior to visit.         The following portions of the patient's history were reviewed and updated as appropriate: allergies, current medications,  "past family history, past medical history, past social history, past surgical history and problem list.    Review of Systems   Constitutional: Negative.   HENT: Negative.  Negative for congestion.    Eyes: Negative.    Cardiovascular: Negative.  Negative for chest pain, cyanosis, dyspnea on exertion, irregular heartbeat, leg swelling, near-syncope, orthopnea, palpitations, paroxysmal nocturnal dyspnea and syncope.   Respiratory: Negative.  Negative for shortness of breath.    Hematologic/Lymphatic: Negative.    Musculoskeletal: Negative.    Gastrointestinal: Negative.    Neurological: Negative.  Negative for headaches.          Objective:     /86 (BP Location: Left arm, Patient Position: Sitting, Cuff Size: Adult)   Pulse 74   Ht 167.6 cm (66\")   Wt 67.1 kg (148 lb)   SpO2 98%   BMI 23.89 kg/m²   Pulmonary:      Effort: Pulmonary effort is normal.      Breath sounds: Normal breath sounds. No stridor. No wheezing. No rhonchi. No rales.   Cardiovascular:      PMI at left midclavicular line. Normal rate. Regular rhythm. Normal S1. Normal S2.      Murmurs: There is no murmur.      No gallop. No click. No rub.   Pulses:     Intact distal pulses.   Edema:     Peripheral edema absent.           Lab Review  Lab Results   Component Value Date     03/27/2023    K 4.6 03/27/2023     03/27/2023    BUN 13 03/27/2023    CREATININE 1.02 (H) 03/27/2023    GLUCOSE 104 (H) 03/27/2023    CALCIUM 9.4 03/27/2023    ALT 20 03/27/2023    ALKPHOS 69 03/27/2023    LABIL2 1.7 05/09/2016     Lab Results   Component Value Date    CKTOTAL 127 07/01/2022     Lab Results   Component Value Date    WBC 6.22 03/27/2023    HGB 13.9 03/27/2023    HCT 43.0 03/27/2023     03/27/2023     Lab Results   Component Value Date    INR 0.94 03/27/2023    INR 0.95 05/19/2014     Lab Results   Component Value Date    MG 1.9 03/27/2023     Lab Results   Component Value Date    TSH 2.389 10/27/2016     No results found for: BNP  Lab " Results   Component Value Date    CHLPL 195 05/09/2016    CHOL 137 03/27/2023    TRIG 104 03/27/2023    HDL 48 03/27/2023    VLDL 19 03/27/2023    LDLHDL 1.42 03/27/2023     Lab Results   Component Value Date    LDL 70 03/27/2023    LDL 74 07/01/2022     No results found for: PROBNP            Procedures  Interpretation Summary       •  A pharmacological stress test was performed using regadenoson without low-level exercise.  •  Resting EKG showed sinus rhythm at a rate of 60 bpm.  EKG was normal.  •  ST segments did not show any diagnostic changes.  No significant arrhythmia detected.  •  Left ventricular ejection fraction is hyperdynamic (Calculated EF > 70%).  •  Myocardial perfusion imaging indicates a normal myocardial perfusion study with no evidence of ischemia.  •  Impressions are consistent with a low risk study.  •  Compared to the prior study from 8/9/2018 the current study reveals no changes.         I personally viewed and interpreted the patient's LAB data         Assessment:     1. Mixed hyperlipidemia    2. Nonrheumatic mild aortic valve insufficiency and mild tricuspid regurgitation 2018.    3. Palpitations          Plan:     Hyperlipidemia  She was advised to continue Zocor healthy lifestyle emphasized.    Echocardiogram and stress test results were discussed.  Stress test is negative for significant exercise-induced myocardial ischemia  Echocardiogram shows grade 1 LV diastolic dysfunction with normal LV ejection fraction of around 61 to 65%.    Palpitations are controlled with low-dose Toprol which was continued.    Echocardiogram shows mild tricuspid regurgitation without pulmonary hypertension.  Patient complains of dizziness and is worried about carotid stenosis.  Faint carotid bruit is noted we will check carotid Doppler study.  Follow-up scheduled        No follow-ups on file.

## 2023-08-31 RX ORDER — OMEPRAZOLE 40 MG/1
40 CAPSULE, DELAYED RELEASE ORAL DAILY
Qty: 90 CAPSULE | Refills: 0 | Status: SHIPPED | OUTPATIENT
Start: 2023-08-31

## 2024-04-19 ENCOUNTER — TRANSCRIBE ORDERS (OUTPATIENT)
Dept: ADMINISTRATIVE | Facility: HOSPITAL | Age: 79
End: 2024-04-19
Payer: MEDICARE

## 2024-04-19 DIAGNOSIS — M81.0 AGE-RELATED OSTEOPOROSIS WITHOUT CURRENT PATHOLOGICAL FRACTURE: Primary | ICD-10-CM

## 2024-04-25 ENCOUNTER — TRANSCRIBE ORDERS (OUTPATIENT)
Dept: ADMINISTRATIVE | Facility: HOSPITAL | Age: 79
End: 2024-04-25
Payer: MEDICARE

## 2024-04-25 DIAGNOSIS — I65.03: Primary | ICD-10-CM

## 2024-05-03 ENCOUNTER — HOSPITAL ENCOUNTER (OUTPATIENT)
Dept: BONE DENSITY | Facility: HOSPITAL | Age: 79
Discharge: HOME | End: 2024-05-03
Payer: MEDICARE

## 2024-05-03 ENCOUNTER — HOSPITAL ENCOUNTER (OUTPATIENT)
Dept: CARDIOLOGY | Facility: HOSPITAL | Age: 79
Discharge: HOME | End: 2024-05-03
Payer: MEDICARE

## 2024-05-03 DIAGNOSIS — M81.0 AGE-RELATED OSTEOPOROSIS WITHOUT CURRENT PATHOLOGICAL FRACTURE: ICD-10-CM

## 2024-05-03 DIAGNOSIS — I65.03: ICD-10-CM

## 2024-05-03 PROCEDURE — 93880 EXTRACRANIAL BILAT STUDY: CPT

## 2024-05-03 PROCEDURE — 77080 DXA BONE DENSITY AXIAL: CPT | Performed by: RADIOLOGY

## 2024-05-03 PROCEDURE — 77080 DXA BONE DENSITY AXIAL: CPT

## 2024-05-24 ENCOUNTER — TRANSCRIBE ORDERS (OUTPATIENT)
Dept: ADMINISTRATIVE | Facility: HOSPITAL | Age: 79
End: 2024-05-24
Payer: MEDICARE

## 2024-05-24 DIAGNOSIS — I70.8 OCCLUSION OF LEFT SUBCLAVIAN ARTERY: Primary | ICD-10-CM

## 2024-06-06 ENCOUNTER — HOSPITAL ENCOUNTER (OUTPATIENT)
Facility: HOSPITAL | Age: 79
Discharge: HOME | End: 2024-06-06
Payer: MEDICARE

## 2024-06-06 DIAGNOSIS — I70.8 OCCLUSION OF LEFT SUBCLAVIAN ARTERY: ICD-10-CM

## 2024-06-06 PROCEDURE — 70498 CT ANGIOGRAPHY NECK: CPT

## 2024-06-06 PROCEDURE — 25510000001 IOPAMIDOL 61 % SOLUTION: Performed by: FAMILY MEDICINE

## 2024-06-06 RX ADMIN — IOPAMIDOL 100 ML: 612 INJECTION, SOLUTION INTRAVENOUS at 11:00

## 2024-06-28 ENCOUNTER — APPOINTMENT (OUTPATIENT)
Dept: GENERAL RADIOLOGY | Facility: HOSPITAL | Age: 79
End: 2024-06-28
Payer: MEDICARE

## 2024-06-28 ENCOUNTER — HOSPITAL ENCOUNTER (EMERGENCY)
Facility: HOSPITAL | Age: 79
Discharge: HOME | End: 2024-06-28
Attending: EMERGENCY MEDICINE
Payer: MEDICARE

## 2024-06-28 ENCOUNTER — APPOINTMENT (OUTPATIENT)
Dept: CT IMAGING | Facility: HOSPITAL | Age: 79
End: 2024-06-28
Payer: MEDICARE

## 2024-06-28 VITALS
HEIGHT: 66 IN | RESPIRATION RATE: 18 BRPM | WEIGHT: 145 LBS | BODY MASS INDEX: 23.3 KG/M2 | HEART RATE: 55 BPM | TEMPERATURE: 97.4 F | OXYGEN SATURATION: 92 % | DIASTOLIC BLOOD PRESSURE: 68 MMHG | SYSTOLIC BLOOD PRESSURE: 140 MMHG

## 2024-06-28 DIAGNOSIS — R07.9 CHEST PAIN, UNSPECIFIED TYPE: Primary | ICD-10-CM

## 2024-06-28 LAB
ALBUMIN SERPL-MCNC: 4.2 G/DL (ref 3.5–5.2)
ALBUMIN/GLOB SERPL: 1.9 G/DL
ALP SERPL-CCNC: 64 U/L (ref 39–117)
ALT SERPL W P-5'-P-CCNC: 21 U/L (ref 1–33)
ANION GAP SERPL CALCULATED.3IONS-SCNC: 8.5 MMOL/L (ref 5–15)
AST SERPL-CCNC: 16 U/L (ref 1–32)
BASOPHILS # BLD AUTO: 0.04 10*3/MM3 (ref 0–0.2)
BASOPHILS NFR BLD AUTO: 0.9 % (ref 0–1.5)
BILIRUB SERPL-MCNC: 0.6 MG/DL (ref 0–1.2)
BUN SERPL-MCNC: 14 MG/DL (ref 8–23)
BUN/CREAT SERPL: 13.1 (ref 7–25)
CALCIUM SPEC-SCNC: 9.6 MG/DL (ref 8.6–10.5)
CHLORIDE SERPL-SCNC: 107 MMOL/L (ref 98–107)
CO2 SERPL-SCNC: 26.5 MMOL/L (ref 22–29)
CREAT SERPL-MCNC: 1.07 MG/DL (ref 0.57–1)
DEPRECATED RDW RBC AUTO: 45.7 FL (ref 37–54)
EGFRCR SERPLBLD CKD-EPI 2021: 52.6 ML/MIN/1.73
EOSINOPHIL # BLD AUTO: 0.1 10*3/MM3 (ref 0–0.4)
EOSINOPHIL NFR BLD AUTO: 2.2 % (ref 0.3–6.2)
ERYTHROCYTE [DISTWIDTH] IN BLOOD BY AUTOMATED COUNT: 12.8 % (ref 12.3–15.4)
GEN 5 2HR TROPONIN T REFLEX: 23 NG/L
GLOBULIN UR ELPH-MCNC: 2.2 GM/DL
GLUCOSE SERPL-MCNC: 98 MG/DL (ref 65–99)
HCT VFR BLD AUTO: 38.7 % (ref 34–46.6)
HGB BLD-MCNC: 12.5 G/DL (ref 12–15.9)
HOLD SPECIMEN: NORMAL
HOLD SPECIMEN: NORMAL
IMM GRANULOCYTES # BLD AUTO: 0.01 10*3/MM3 (ref 0–0.05)
IMM GRANULOCYTES NFR BLD AUTO: 0.2 % (ref 0–0.5)
LYMPHOCYTES # BLD AUTO: 1.36 10*3/MM3 (ref 0.7–3.1)
LYMPHOCYTES NFR BLD AUTO: 30.2 % (ref 19.6–45.3)
MCH RBC QN AUTO: 31.2 PG (ref 26.6–33)
MCHC RBC AUTO-ENTMCNC: 32.3 G/DL (ref 31.5–35.7)
MCV RBC AUTO: 96.5 FL (ref 79–97)
MONOCYTES # BLD AUTO: 0.38 10*3/MM3 (ref 0.1–0.9)
MONOCYTES NFR BLD AUTO: 8.4 % (ref 5–12)
NEUTROPHILS NFR BLD AUTO: 2.62 10*3/MM3 (ref 1.7–7)
NEUTROPHILS NFR BLD AUTO: 58.1 % (ref 42.7–76)
NRBC BLD AUTO-RTO: 0 /100 WBC (ref 0–0.2)
PLATELET # BLD AUTO: 179 10*3/MM3 (ref 140–450)
PMV BLD AUTO: 10.5 FL (ref 6–12)
POTASSIUM SERPL-SCNC: 4.3 MMOL/L (ref 3.5–5.2)
PROT SERPL-MCNC: 6.4 G/DL (ref 6–8.5)
RBC # BLD AUTO: 4.01 10*6/MM3 (ref 3.77–5.28)
SODIUM SERPL-SCNC: 142 MMOL/L (ref 136–145)
TROPONIN T DELTA: -4 NG/L
TROPONIN T SERPL HS-MCNC: 27 NG/L
WBC NRBC COR # BLD AUTO: 4.51 10*3/MM3 (ref 3.4–10.8)
WHOLE BLOOD HOLD COAG: NORMAL
WHOLE BLOOD HOLD SPECIMEN: NORMAL

## 2024-06-28 PROCEDURE — 93005 ELECTROCARDIOGRAM TRACING: CPT | Performed by: EMERGENCY MEDICINE

## 2024-06-28 PROCEDURE — 25510000001 IOPAMIDOL PER 1 ML: Performed by: STUDENT IN AN ORGANIZED HEALTH CARE EDUCATION/TRAINING PROGRAM

## 2024-06-28 PROCEDURE — 71275 CT ANGIOGRAPHY CHEST: CPT | Performed by: RADIOLOGY

## 2024-06-28 PROCEDURE — 71275 CT ANGIOGRAPHY CHEST: CPT

## 2024-06-28 PROCEDURE — 96374 THER/PROPH/DIAG INJ IV PUSH: CPT

## 2024-06-28 PROCEDURE — 99285 EMERGENCY DEPT VISIT HI MDM: CPT

## 2024-06-28 PROCEDURE — 36415 COLL VENOUS BLD VENIPUNCTURE: CPT

## 2024-06-28 PROCEDURE — 85025 COMPLETE CBC W/AUTO DIFF WBC: CPT | Performed by: EMERGENCY MEDICINE

## 2024-06-28 PROCEDURE — 71045 X-RAY EXAM CHEST 1 VIEW: CPT

## 2024-06-28 PROCEDURE — 25010000002 KETOROLAC TROMETHAMINE PER 15 MG: Performed by: STUDENT IN AN ORGANIZED HEALTH CARE EDUCATION/TRAINING PROGRAM

## 2024-06-28 PROCEDURE — 80053 COMPREHEN METABOLIC PANEL: CPT | Performed by: EMERGENCY MEDICINE

## 2024-06-28 PROCEDURE — 84484 ASSAY OF TROPONIN QUANT: CPT | Performed by: EMERGENCY MEDICINE

## 2024-06-28 RX ORDER — ASPIRIN 81 MG/1
324 TABLET, CHEWABLE ORAL ONCE
Status: COMPLETED | OUTPATIENT
Start: 2024-06-28 | End: 2024-06-28

## 2024-06-28 RX ORDER — SODIUM CHLORIDE 0.9 % (FLUSH) 0.9 %
10 SYRINGE (ML) INJECTION AS NEEDED
Status: DISCONTINUED | OUTPATIENT
Start: 2024-06-28 | End: 2024-06-29 | Stop reason: HOSPADM

## 2024-06-28 RX ORDER — KETOROLAC TROMETHAMINE 30 MG/ML
15 INJECTION, SOLUTION INTRAMUSCULAR; INTRAVENOUS ONCE
Status: COMPLETED | OUTPATIENT
Start: 2024-06-28 | End: 2024-06-28

## 2024-06-28 RX ADMIN — ASPIRIN 243 MG: 81 TABLET, CHEWABLE ORAL at 18:35

## 2024-06-28 RX ADMIN — IOPAMIDOL 70 ML: 755 INJECTION, SOLUTION INTRAVENOUS at 22:26

## 2024-06-28 RX ADMIN — KETOROLAC TROMETHAMINE 15 MG: 30 INJECTION, SOLUTION INTRAMUSCULAR; INTRAVENOUS at 21:31

## 2024-06-28 NOTE — ED PROVIDER NOTES
Subjective   History of Present Illness  This is a 79-year-old female who presents to the emergency department chief complaint chest pain.  Patient states she has had central left pressure that radiates to her neck and left arm.  Denies any history of coronary artery disease.  Patient has history of hyperlipidemia, hypertension.    History provided by:  Patient   used: No    Chest Pain  Pain location:  L chest  Pain quality: pressure    Pain radiates to:  Does not radiate  Pain severity:  Moderate  Onset quality:  Gradual  Duration:  1 day  Timing:  Intermittent  Progression:  Worsening  Chronicity:  New  Context: not drug use, not eating, not intercourse and not lifting    Relieved by:  Nothing  Worsened by:  Nothing  Ineffective treatments:  None tried  Associated symptoms: no abdominal pain, no altered mental status, no anorexia, no back pain, no claudication, no cough, no diaphoresis, no dizziness, no dysphagia, no fever, no headache, no lower extremity edema, no nausea, no near-syncope, no numbness, no orthopnea, no palpitations, no PND, no syncope and no vomiting    Risk factors: high cholesterol and hypertension    Risk factors: no birth control, no coronary artery disease, no diabetes mellitus, not pregnant, no prior DVT/PE and no smoking        Review of Systems   Constitutional: Negative.  Negative for appetite change, diaphoresis and fever.   HENT: Negative.  Negative for ear discharge, ear pain, facial swelling, hearing loss and trouble swallowing.    Eyes: Negative.  Negative for pain, redness and itching.   Respiratory:  Positive for chest tightness. Negative for cough.    Cardiovascular:  Positive for chest pain. Negative for palpitations, orthopnea, claudication, syncope, PND and near-syncope.   Gastrointestinal: Negative.  Negative for abdominal pain, anorexia, nausea and vomiting.   Endocrine: Negative.  Negative for heat intolerance, polydipsia and polyphagia.   Genitourinary:  Negative.  Negative for decreased urine volume, enuresis, flank pain, genital sores, hematuria, pelvic pain and vaginal bleeding.   Musculoskeletal: Negative.  Negative for back pain.   Skin: Negative.  Negative for pallor.   Neurological: Negative.  Negative for dizziness, seizures, speech difficulty, numbness and headaches.   Hematological: Negative.    Psychiatric/Behavioral: Negative.  Negative for decreased concentration, dysphoric mood, hallucinations and self-injury. The patient is not nervous/anxious.    All other systems reviewed and are negative.      Past Medical History:   Diagnosis Date    Asthma     Elevated cholesterol     Essential hypertension 9/24/2020    GERD (gastroesophageal reflux disease)     Hyperlipidemia     Kidney stones     Osteoporosis     Tongue irritation     Weight loss        Allergies   Allergen Reactions    Latex Other (See Comments)     Blisters on hands       Past Surgical History:   Procedure Laterality Date    CARDIAC CATHETERIZATION  2014    CARDIOVASCULAR STRESS TEST  2014    CATARACT EXTRACTION, BILATERAL  09/2021    COLONOSCOPY      ECHO - CONVERTED  2014    ENDOSCOPY  2015    EXTRACORPOREAL SHOCK WAVE LITHOTRIPSY (ESWL) Left 9/13/2019    Procedure: EXTRACORPOREAL SHOCKWAVE LITHOTRIPSY;  Surgeon: Demarco Aldana MD;  Location: [facility];  Service: Urology    HYSTERECTOMY  2002    benign    LAPAROSCOPIC CHOLECYSTECTOMY  2001       Family History   Problem Relation Age of Onset    Heart attack Mother     Heart failure Mother     Hypertension Mother     Heart attack Father     Hypertension Father     Heart block Brother     Heart block Sister     Heart block Sister     Heart attack Brother     Heart block Brother     Stroke Brother     Breast cancer Neg Hx        Social History     Socioeconomic History    Marital status:    Tobacco Use    Smoking status: Never    Smokeless tobacco: Never   Vaping Use    Vaping status: Never Used   Substance and Sexual Activity     Alcohol use: No    Drug use: No    Sexual activity: Defer           Objective   Physical Exam  Vitals and nursing note reviewed.   Constitutional:       General: She is not in acute distress.     Appearance: She is well-developed and normal weight. She is not ill-appearing, toxic-appearing or diaphoretic.   HENT:      Head: Normocephalic and atraumatic.   Eyes:      Extraocular Movements: Extraocular movements intact.      Pupils: Pupils are equal, round, and reactive to light.   Neck:      Thyroid: No thyromegaly.      Vascular: No hepatojugular reflux.      Trachea: No tracheal deviation.   Cardiovascular:      Rate and Rhythm: Normal rate.      Pulses:           Carotid pulses are 2+ on the right side and 2+ on the left side.       Radial pulses are 2+ on the right side and 2+ on the left side.        Dorsalis pedis pulses are 2+ on the right side and 2+ on the left side.        Posterior tibial pulses are 2+ on the right side and 2+ on the left side.      Heart sounds: Normal heart sounds. Heart sounds not distant. No murmur heard.  Pulmonary:      Effort: No tachypnea, accessory muscle usage or respiratory distress.      Breath sounds: Normal breath sounds. No decreased breath sounds, wheezing or rhonchi.   Chest:      Chest wall: No mass, deformity, tenderness, crepitus or edema.   Abdominal:      General: Bowel sounds are normal. There is no abdominal bruit.      Palpations: Abdomen is soft. There is no fluid wave, hepatomegaly, splenomegaly or mass.      Tenderness: There is no abdominal tenderness. There is no guarding.   Musculoskeletal:         General: Normal range of motion.      Cervical back: Normal range of motion and neck supple.      Right lower leg: No tenderness. No edema.   Lymphadenopathy:      Cervical: No cervical adenopathy.   Skin:     General: Skin is warm and dry.      Capillary Refill: Capillary refill takes less than 2 seconds.      Coloration: Skin is not cyanotic or pale.       Findings: No ecchymosis, erythema or rash.   Neurological:      General: No focal deficit present.      Mental Status: She is alert and oriented to person, place, and time.      Cranial Nerves: No cranial nerve deficit.      Motor: No weakness.   Psychiatric:         Mood and Affect: Mood normal. Mood is not anxious.         Behavior: Behavior normal. Behavior is not agitated.         Procedures           ED Course  ED Course as of 06/28/24 2257 Fri Jun 28, 2024   1804 EKG 1803  Rate 64  Normal sinus rhythm  Normal axis  , QRS 98, QTc 414  Interpretation: Normal sinus rhythm.  Electronically signed by Nadir Torres DO, 06/28/24, 6:05 PM EDT.   [AN]   1912 Narrative & Impression  Procedure: Portable semiupright frontal view chest  No priors   Findings: Chest is well-expanded.  No airspace consolidation, effusion or congestive changes.  Heart size is normal. No pneumothorax.  Trachea is patent.  Osseous structures intact. No radiopaque foreign body.  IMPRESSION:  No plain film evidence of an acute cardiopulmonary process.        This report was finalized on 6/28/2024 7:09 PM by Pam Madrigal MD.   [BH]   1945 Took over care of patient pending delta troponin reevaluation  Electronically signed by Josue Nguyen MD, 06/28/24, 7:45 PM EDT.   [AS]   1946 Endorsed to Dr. Nguyen.  [BH]   2254 IMPRESSION:     1.  Mild enlarged heart size.  2.  Mild ectatic ascending thoracic aorta up to 3.7 cm in diameter.  3.  No pericardial effusion or pleural effusion.  4.  No features of edema, pneumonia, pleural effusion, or pneumothorax.  5.  No thoracic aortic dissection.  6.  No pulmonary embolus.   [AS]   2256 Patient well-appearing on reevaluation.  Delta troponin stable, both troponins were at her baseline.  After discussion with patient, CT PE scan was obtained for further evaluation and showed no acute findings.  Toradol was given for pain with some improvement.  Patient had a normal stress test 1 year ago.  I  think she is appropriate for outpatient workup at this time.  Will have her follow-up with her cardiologist.  Given appropriate return precautions for new or worsening symptoms and discharged in no acute distress.  Electronically signed by Josue Nguyen MD, 06/28/24, 10:57 PM EDT.   [AS]      ED Course User Index  [AN] Nadir Torres DO  [AS] Josue Nguyen MD  [] Enoc Ma PA-C                HEART Score: 4                              Medical Decision Making  Problems Addressed:  Chest pain, unspecified type: complicated acute illness or injury    Amount and/or Complexity of Data Reviewed  Labs: ordered.  Radiology: ordered.  ECG/medicine tests: ordered.    Risk  OTC drugs.  Prescription drug management.        Final diagnoses:   Chest pain, unspecified type       ED Disposition  ED Disposition       ED Disposition   Discharge    Condition   Stable    Comment   --               Jim Vargas DO  [address] [facility] [address]  [phone]    Schedule an appointment as soon as possible for a visit       [facility] EMERGENCY DEPARTMENT  [address] [place] [id number] [phone]    If symptoms worsen         Medication List      No changes were made to your prescriptions during this visit.            Josue Nguyen MD  06/28/24 [id number]

## 2024-06-29 LAB
QT INTERVAL: 402 MS
QTC INTERVAL: 414 MS

## 2024-06-29 NOTE — DISCHARGE INSTRUCTIONS
Recommend follow-up with your cardiologist.  Return to the ED if symptoms acutely worsen or change.

## 2024-11-21 ENCOUNTER — OFFICE VISIT (OUTPATIENT)
Dept: NEUROSURGERY | Facility: CLINIC | Age: 79
End: 2024-11-21
Payer: MEDICARE

## 2024-11-21 VITALS
WEIGHT: 144 LBS | TEMPERATURE: 98 F | BODY MASS INDEX: 23.14 KG/M2 | DIASTOLIC BLOOD PRESSURE: 72 MMHG | HEIGHT: 66 IN | SYSTOLIC BLOOD PRESSURE: 140 MMHG

## 2024-11-21 DIAGNOSIS — I65.02 STENOSIS OF LEFT VERTEBRAL ARTERY: Primary | ICD-10-CM

## 2024-11-21 RX ORDER — IBUPROFEN 200 MG
200 TABLET ORAL EVERY 6 HOURS PRN
COMMUNITY

## 2024-11-21 RX ORDER — SODIUM PHOSPHATE,MONO-DIBASIC 19G-7G/118
ENEMA (ML) RECTAL
COMMUNITY

## 2024-11-21 NOTE — PROGRESS NOTES
Patient: Elise Santamaria  : 1945    Primary Care Provider: Jim Vargas DO    Requesting Provider: As above      Chief Complaint: Vertebral artery stenosis    History of Present Illness: This is a 79 y.o. female who presents for evaluation of left vertebral artery stenosis.  The patient underwent a CTA of her head and neck for what sounds like a screening scan.  She denies having any previous episode of a stroke or TIA-like event.  This CTA shows stenosis at the origin of the left vertebral artery and she was subsequently referred for further evaluation.  In talking the patient, she has no neurological complaints.  She is not having any vertebrobasilar symptoms including dizziness, balance issues or or problems associated with vertebrobasilar insufficiency.  Overall, she feels well.  She does take a baby aspirin and a statin daily.    PMHX  Allergies:  Allergies   Allergen Reactions    Latex Other (See Comments)     Blisters on hands     Medications    Current Outpatient Medications:     alendronate (FOSAMAX) 70 MG tablet, Take 1 tablet by mouth Every 7 (Seven) Days., Disp: 12 tablet, Rfl: 1    aspirin 81 MG EC tablet, Take 1 tablet by mouth Daily., Disp: , Rfl:     cholecalciferol (VITAMIN D3) 1000 UNITS tablet, Take 1 tablet by mouth Daily., Disp: , Rfl:     furosemide (LASIX) 20 MG tablet, Take 1 tablet by mouth Every Other Day As Needed (edema)., Disp: 45 tablet, Rfl: 2    glucosamine-chondroitin 500-400 MG capsule capsule, Take  by mouth 3 (Three) Times a Day With Meals., Disp: , Rfl:     ibuprofen (ADVIL,MOTRIN) 200 MG tablet, Take 1 tablet by mouth Every 6 (Six) Hours As Needed for Mild Pain., Disp: , Rfl:     metoprolol succinate XL (TOPROL-XL) 25 MG 24 hr tablet, Take 0.5 tablets by mouth Daily., Disp: , Rfl:     montelukast (SINGULAIR) 10 MG tablet, TAKE 1 TABLET BY MOUTH EVERY NIGHT, Disp: 90 tablet, Rfl: 0    Multiple Vitamin (MULTI VITAMIN DAILY PO), Take  by mouth., Disp: , Rfl:      nystatin (MYCOSTATIN) 945367 UNIT/GM ointment, APPLY TOPICALLY TO THE AFFECTED AREA TWICE DAILY FOR YEAST, Disp: , Rfl:     Omega 3-6-9 Fatty Acids (OMEGA 3-6-9 PO), Take 1 capsule by mouth Daily., Disp: , Rfl:     omeprazole (priLOSEC) 40 MG capsule, TAKE 1 CAPSULE BY MOUTH DAILY, Disp: 90 capsule, Rfl: 0    simvastatin (ZOCOR) 20 MG tablet, Take 1 tablet by mouth Every Evening., Disp: , Rfl:     vitamin B-12 (CYANOCOBALAMIN) 1000 MCG tablet, Take 1 tablet by mouth Daily., Disp: , Rfl:     clobetasol (TEMOVATE) 0.05 % ointment, , Disp: , Rfl:   Past Medical History:  Past Medical History:   Diagnosis Date    Asthma     Elevated cholesterol     Essential hypertension 9/24/2020    GERD (gastroesophageal reflux disease)     Hyperlipidemia     Kidney stones     Osteoporosis     Tongue irritation     Weight loss      Past Surgical History:  Past Surgical History:   Procedure Laterality Date    CARDIAC CATHETERIZATION  2014    CARDIOVASCULAR STRESS TEST  2014    CATARACT EXTRACTION, BILATERAL  09/2021    COLONOSCOPY      ECHO - CONVERTED  2014    ENDOSCOPY  2015    EXTRACORPOREAL SHOCK WAVE LITHOTRIPSY (ESWL) Left 9/13/2019    Procedure: EXTRACORPOREAL SHOCKWAVE LITHOTRIPSY;  Surgeon: Demarco Aldana MD;  Location: [facility];  Service: Urology    HYSTERECTOMY  2002    benign    LAPAROSCOPIC CHOLECYSTECTOMY  2001     Social Hx:  Social History     Tobacco Use    Smoking status: Never     Passive exposure: Never    Smokeless tobacco: Never   Vaping Use    Vaping status: Never Used   Substance Use Topics    Alcohol use: No    Drug use: No     Family Hx:  Family History   Problem Relation Age of Onset    Heart attack Mother     Heart failure Mother     Hypertension Mother     Heart attack Father     Hypertension Father     Heart block Brother     Heart block Sister     Heart block Sister     Heart attack Brother     Heart block Brother     Stroke Brother     Breast cancer Neg Hx      Review of Systems:        Review  of Systems   Constitutional:  Negative for activity change, appetite change, chills, diaphoresis, fatigue, fever and unexpected weight change.   HENT:  Negative for congestion, dental problem, drooling, ear discharge, ear pain, facial swelling, hearing loss, mouth sores, nosebleeds, postnasal drip, rhinorrhea, sinus pressure, sinus pain, sneezing, sore throat, tinnitus, trouble swallowing and voice change.    Eyes:  Negative for photophobia, pain, discharge, redness, itching and visual disturbance.   Respiratory:  Negative for apnea, cough, choking, chest tightness, shortness of breath, wheezing and stridor.    Cardiovascular:  Negative for chest pain, palpitations and leg swelling.   Gastrointestinal:  Negative for abdominal distention, abdominal pain, anal bleeding, blood in stool, constipation, diarrhea, nausea, rectal pain and vomiting.   Endocrine: Negative for cold intolerance, heat intolerance, polydipsia, polyphagia and polyuria.   Genitourinary:  Negative for decreased urine volume, difficulty urinating, dysuria, enuresis, flank pain, frequency, genital sores, hematuria and urgency.   Musculoskeletal:  Negative for arthralgias, back pain, gait problem, joint swelling, myalgias, neck pain and neck stiffness.   Skin:  Negative for color change, pallor, rash and wound.   Allergic/Immunologic: Negative for environmental allergies, food allergies and immunocompromised state.   Neurological:  Positive for headaches (not quite true HA, but change in sensation in head). Negative for dizziness, tremors, seizures, syncope, facial asymmetry, speech difficulty, weakness, light-headedness and numbness.   Hematological:  Negative for adenopathy. Does not bruise/bleed easily.   Psychiatric/Behavioral:  Negative for agitation, behavioral problems, confusion, decreased concentration, dysphoric mood, hallucinations, self-injury, sleep disturbance and suicidal ideas. The patient is not nervous/anxious and is not hyperactive.   "  All other systems reviewed and are negative.       Physical Exam:   /72 (BP Location: Right arm, Patient Position: Sitting, Cuff Size: Adult)   Temp 98 °F (36.7 °C) (Infrared)   Ht 167.6 cm (66\")   Wt 65.3 kg (144 lb)   BMI 23.24 kg/m²   Awake, alert and oriented x 3  Speech f/c  Opens eyes spont  Pupils 3 mm rx bilaterally  Extraocular muscles intact bilaterally  Normal sensation to light touch in all 3 distributions of CN V bilaterally  Face symmetric bilaterally  Tongue midline  5/5 in all 4 ext  Normal sensation to light touch in all 4 ext  2+DTR's  No saucedo's or clonus bilaterally  No pronator drift or dysmetria  Gait not assessed    Diagnostic Studies:  All neurological imaging studies were independently reviewed unless stated otherwise    Assessment/Plan:  This is a 79 y.o. female presenting for evaluation of left vertebral artery stenosis at the origin.  In reviewing the patient's CTA of her neck, there is no significant stenosis in either internal carotid artery.  She does have stenosis at the origin of the left vertebral artery, but she has a dominant right vertebral artery which is the primary supply to the basilar.  She does not have any symptoms associated with the stenosis and given that she is right vertebral artery dominant, I do not think she requires any further follow-up imaging.  I did review the signs and symptoms of stroke and told the patient to go the emergency room if she were to experience these.  At this point, however, I do not think we need to schedule any further follow-up with me.    Diagnoses and all orders for this visit:    1. Stenosis of left vertebral artery (Primary)      Elise Mcdonald Hina  reports that she has never smoked. She has never been exposed to tobacco smoke. She has never used smokeless tobacco.       Kush Leach MD  11/21/24  14:07 EST  "

## 2025-06-25 ENCOUNTER — TRANSCRIBE ORDERS (OUTPATIENT)
Dept: ADMINISTRATIVE | Facility: HOSPITAL | Age: 80
End: 2025-06-25
Payer: MEDICARE

## 2025-06-25 DIAGNOSIS — Z12.31 VISIT FOR SCREENING MAMMOGRAM: Primary | ICD-10-CM

## 2025-06-26 ENCOUNTER — TELEPHONE (OUTPATIENT)
Dept: CARDIAC SURGERY | Facility: CLINIC | Age: 80
End: 2025-06-26
Payer: MEDICARE

## 2025-06-26 NOTE — TELEPHONE ENCOUNTER
We have received a referral for this patient on 6/25/25 by her PCP Dr. Farzad Vargas, when I called for more recent testing due it being from 2024 the front office staff stated that they have not referred patient to our office and had not seem there provider since last year. I told her that I will just cancel/ close out this referral until further notice

## 2025-07-01 ENCOUNTER — HOSPITAL ENCOUNTER (OUTPATIENT)
Dept: MAMMOGRAPHY | Facility: HOSPITAL | Age: 80
Discharge: HOME | End: 2025-07-01
Admitting: FAMILY MEDICINE
Payer: MEDICARE

## 2025-07-01 DIAGNOSIS — Z12.31 VISIT FOR SCREENING MAMMOGRAM: ICD-10-CM

## 2025-07-01 PROCEDURE — 77067 SCR MAMMO BI INCL CAD: CPT

## 2025-07-01 PROCEDURE — 77063 BREAST TOMOSYNTHESIS BI: CPT

## 2025-07-01 PROCEDURE — 77067 SCR MAMMO BI INCL CAD: CPT | Performed by: RADIOLOGY

## 2025-07-01 PROCEDURE — 77063 BREAST TOMOSYNTHESIS BI: CPT | Performed by: RADIOLOGY

## (undated) DEVICE — ANESTHESIA CIRCUIT ADULT-LF: Brand: MEDLINE INDUSTRIES, INC.